# Patient Record
Sex: FEMALE | Race: WHITE | Employment: OTHER | URBAN - METROPOLITAN AREA
[De-identification: names, ages, dates, MRNs, and addresses within clinical notes are randomized per-mention and may not be internally consistent; named-entity substitution may affect disease eponyms.]

---

## 2017-04-04 ENCOUNTER — HOSPITAL ENCOUNTER (OUTPATIENT)
Dept: GENERAL RADIOLOGY | Age: 77
Discharge: HOME | End: 2017-04-04
Payer: MEDICARE

## 2017-04-04 DIAGNOSIS — M54.2 NECK PAIN: ICD-10-CM

## 2017-04-04 PROCEDURE — 72050 X-RAY EXAM NECK SPINE 4/5VWS: CPT

## 2018-08-10 ENCOUNTER — HOSPITAL ENCOUNTER (OUTPATIENT)
Dept: GENERAL RADIOLOGY | Age: 78
Discharge: HOME | End: 2018-08-12
Payer: MEDICARE

## 2018-08-10 DIAGNOSIS — M25.551 RIGHT HIP PAIN: ICD-10-CM

## 2018-08-10 PROCEDURE — 73502 X-RAY EXAM HIP UNI 2-3 VIEWS: CPT

## 2019-03-27 ENCOUNTER — HOSPITAL ENCOUNTER (OUTPATIENT)
Dept: ORTHOPEDIC SURGERY | Age: 79
Discharge: HOME | End: 2019-03-29
Payer: MEDICARE

## 2019-03-27 DIAGNOSIS — M25.561 ARTHRALGIA OF RIGHT LOWER LEG: ICD-10-CM

## 2019-03-27 PROCEDURE — 73560 X-RAY EXAM OF KNEE 1 OR 2: CPT

## 2019-05-16 ENCOUNTER — HOSPITAL ENCOUNTER (OUTPATIENT)
Dept: ULTRASOUND IMAGING | Age: 79
Discharge: HOME | End: 2019-05-18
Payer: MEDICARE

## 2019-05-16 DIAGNOSIS — M79.89 SWELLING OF LOWER EXTREMITY: ICD-10-CM

## 2019-05-16 PROCEDURE — 93971 EXTREMITY STUDY: CPT

## 2019-12-10 ENCOUNTER — HOSPITAL ENCOUNTER (OUTPATIENT)
Dept: GENERAL RADIOLOGY | Age: 79
Discharge: HOME | End: 2019-12-12
Payer: MEDICARE

## 2019-12-10 VITALS — HEART RATE: 106 BPM | SYSTOLIC BLOOD PRESSURE: 141 MMHG | DIASTOLIC BLOOD PRESSURE: 83 MMHG

## 2019-12-10 DIAGNOSIS — M87.00 AVN (AVASCULAR NECROSIS OF BONE) (HCC): ICD-10-CM

## 2019-12-10 PROCEDURE — 6360000002 HC RX W HCPCS: Performed by: ORTHOPAEDIC SURGERY

## 2019-12-10 PROCEDURE — 6360000004 HC RX CONTRAST MEDICATION: Performed by: ORTHOPAEDIC SURGERY

## 2019-12-10 PROCEDURE — 20610 DRAIN/INJ JOINT/BURSA W/O US: CPT

## 2019-12-10 PROCEDURE — 2500000003 HC RX 250 WO HCPCS: Performed by: ORTHOPAEDIC SURGERY

## 2019-12-10 RX ORDER — LIDOCAINE HYDROCHLORIDE 20 MG/ML
20 INJECTION, SOLUTION INFILTRATION; PERINEURAL ONCE
Status: COMPLETED | OUTPATIENT
Start: 2019-12-10 | End: 2019-12-10

## 2019-12-10 RX ORDER — BUPIVACAINE HYDROCHLORIDE 5 MG/ML
10 INJECTION, SOLUTION EPIDURAL; INTRACAUDAL ONCE
Status: COMPLETED | OUTPATIENT
Start: 2019-12-10 | End: 2019-12-10

## 2019-12-10 RX ORDER — TRIAMCINOLONE ACETONIDE 40 MG/ML
80 INJECTION, SUSPENSION INTRA-ARTICULAR; INTRAMUSCULAR ONCE
Status: COMPLETED | OUTPATIENT
Start: 2019-12-10 | End: 2019-12-10

## 2019-12-10 RX ADMIN — LIDOCAINE HYDROCHLORIDE 7 ML: 20 INJECTION, SOLUTION INFILTRATION; PERINEURAL at 10:27

## 2019-12-10 RX ADMIN — BUPIVACAINE HYDROCHLORIDE 3 MG: 5 INJECTION, SOLUTION EPIDURAL; INTRACAUDAL at 10:26

## 2019-12-10 RX ADMIN — IOVERSOL 1 ML: 678 INJECTION INTRA-ARTERIAL; INTRAVENOUS at 10:27

## 2019-12-10 RX ADMIN — TRIAMCINOLONE ACETONIDE 80 MG: 40 INJECTION, SUSPENSION INTRA-ARTICULAR; INTRAMUSCULAR at 10:28

## 2019-12-10 ASSESSMENT — PAIN SCALES - GENERAL
PAINLEVEL_OUTOF10: 7
PAINLEVEL_OUTOF10: 7

## 2019-12-23 ENCOUNTER — HOSPITAL ENCOUNTER (OUTPATIENT)
Dept: MRI IMAGING | Age: 79
Discharge: HOME | End: 2019-12-25
Payer: MEDICARE

## 2019-12-23 DIAGNOSIS — M87.00 AVASCULAR NECROSIS (HCC): ICD-10-CM

## 2019-12-23 PROCEDURE — 73721 MRI JNT OF LWR EXTRE W/O DYE: CPT

## 2020-07-31 ENCOUNTER — HOSPITAL ENCOUNTER (OUTPATIENT)
Dept: PHYSICAL THERAPY | Age: 80
Setting detail: THERAPIES SERIES
Discharge: HOME | End: 2020-07-31
Payer: MEDICARE

## 2020-07-31 PROCEDURE — 97140 MANUAL THERAPY 1/> REGIONS: CPT

## 2020-07-31 PROCEDURE — 97162 PT EVAL MOD COMPLEX 30 MIN: CPT

## 2020-07-31 ASSESSMENT — PAIN - FUNCTIONAL ASSESSMENT: PAIN_FUNCTIONAL_ASSESSMENT: PREVENTS OR INTERFERES WITH MANY ACTIVE NOT PASSIVE ACTIVITIES

## 2020-07-31 ASSESSMENT — PAIN DESCRIPTION - ONSET: ONSET: AWAKENED FROM SLEEP

## 2020-07-31 ASSESSMENT — PAIN DESCRIPTION - FREQUENCY: FREQUENCY: CONTINUOUS

## 2020-07-31 ASSESSMENT — PAIN SCALES - GENERAL: PAINLEVEL_OUTOF10: 3

## 2020-07-31 ASSESSMENT — PAIN DESCRIPTION - LOCATION: LOCATION: HIP;GROIN

## 2020-07-31 ASSESSMENT — PAIN DESCRIPTION - DESCRIPTORS: DESCRIPTORS: ACHING;SHARP;SHOOTING

## 2020-07-31 ASSESSMENT — PAIN DESCRIPTION - ORIENTATION: ORIENTATION: RIGHT

## 2020-07-31 NOTE — PROGRESS NOTES
in home PT visit and progressively worsening despite doing ex's. Pt reports having a MD follow up with xray and doctor advised alignment and healing looked good. Comments: RTD 9/29/20    Objective:        Balance  Comments: unable to SLS without UE assist.    Bed Mobility  Bridging: Stand by assistance    Ambulation 1  Surface: carpet  Device: Rolling Walker  Assistance: Modified Independent  Gait Deviations: Decreased step length, Slow Pily, Decreased head and trunk rotation  Distance: 50'x 2  Stairs  # Steps : 4  Stairs Height: 6\"  Rails: Bilateral  Assistance: Modified independent   Comment: non recip, sidestepping down          Strength RLE  Comment: Hip 3-/5, knee 3+/5, 4/5 ankle, EHL 0/5  Strength LLE  Comment: Hip 3+/5, knee  4/5 ankle, EHL 0/5      AROM RLE (degrees)  RLE General AROM: Hip flex 15, Abd 10, -5-125 knee flex, ankle WFL     AROM LLE (degrees)  LLE General AROM: Hip Flex 45, Abd 30, -5-115 knee flex, ankle WFL        Observation/Palpation  Posture: Fair  Bed mobility  Bridging: Stand by assistance  Supine to Sit: Minimal assistance  Sit to Supine: Stand by assistance     Additional Measures  Flexibility: Tight hamstring B R>L  Special Tests: NT     Exercises:   Exercises  Exercise 1: nu step*  Exercise 2: seated LAQ*  Exercise 3: ham curl*  Exercise 4: ham stretch*  Exercise 5: tennis ball massage per R incision/glut 2 min. Exercise 6: reviewed current HEP and instruct on continuation of HEP  Exercise 20: HEP: hams stretch, sink ex's, tennis bal massage, use of ice for pain. Modalities:  Modalities  Cryotherapy (Minutes\Location): *  Manual:  Manual therapy  Soft Tissue Mobalization: 4 min clemencia R hip and medial glut, avoiding incision  *Indicates exercise,modality, or manual techniques to be initiated when appropriate  Assessment:   Body structures, Functions, Activity limitations: Decreased functional mobility , Decreased ADL status, Decreased balance, Decreased posture, Increased pain, Decreased strength, Decreased ROM  Assessment: The pt's impairments currently limit functional abilities by 97% including her abilities to stand prolonged periods, reach and lift, perform recreational actvities, and perform household/work related duties without pain or limitations. Prognosis: Good  Discharge Recommendations: Continue to assess pending progress        Decision Making: Medium Complexity  History: Needs R knee replacement, L TKR  Exam: LEFS: 2/80=3% functional  Clinical Presentation: evolving        Plan  Frequency/Duration:  Plan  Times per week: 2  Plan weeks: 4-6  Current Treatment Recommendations: Strengthening, Home Exercise Program, ROM, Manual Therapy - Soft Tissue Mobilization, Modalities, Stair training, Gait Training, Transfer Training, Functional Mobility Training, Balance Training, Endurance Training         Patient Education  New Education Provided: PT Education: Goals;PT Role;Plan of Care;Home Exercise Program;Gait Training  Patient Education: Written HEP    POST-PAIN     Pain Rating (0-10 pain scale):   0/10 at rest  Location and pain description same as pre-treatment unless indicated. Action: [x] NA  [] Call Physician  [] Perform HEP  [] Meds as prescribed    Evaluation and patient rights have been reviewed and patient agrees with plan of care. Yes  [x]  No  []   Explain:       Matthew Fall Risk Assessment  Risk Factor Scale  Score   History of Falls [] Yes  [x] No 25  0 0   Secondary Diagnosis [] Yes  [x] No 15  0 0   Ambulatory Aid [] Furniture  [x] Crutches/cane/walker  [] None/bedrest/wheelchair/nurse 30  15  0 15   IV/Heparin Lock [] Yes  [x] No 20  0 0   Gait/Transferring [] Impaired  [x] Weak  [] Normal/bedrest/immobile 20  10  0 10   Mental Status [] Forgets limitations  [x] Oriented to own ability 15  0 0      Total:25     Based on the Assessment score: check the appropriate box.   []  No intervention needed   Low =   Score of 0-24  [x]  Use standard prevention interventions Moderate =  Score of 24-44   [x] Discuss fall prevention strategies   [x] Indicate moderate falls risk on eval  []  Use high risk prevention interventions High = Score of 45 and higher   [] Discuss fall prevention strategies   [] Provide supervision during treatment time    Goals  Short term goals  Time Frame for Short term goals: 2-3 weeks  Short term goal 1: Initiate HEP for pain and symptom mgmt  Short term goal 2: Amb with st cane Indep 100'x 3 with minimal deviations. Long term goals  Time Frame for Long term goals : 4-6 weeks  Long term goal 1: Indep HEP for functional progression and pain mgmt. Long term goal 2: Ambulate with LRD with min to no deviations community distance without increase pain. Long term goal 3: Improve R LE strength 4-/5 to allow patient to perfrom transfers and bed mobility Indep.   Long term goal 4: Pt demo improved overall function by reporting greater than 50% per functional survey score    PT Individual Minutes  Time In: 1100  Time Out: 1145  Minutes: 45  Timed Code Treatment Minutes: 8 Minutes  Procedure Minutes:Eval  37 min     Timed Activity Minutes Units   Ther Ex 4 0   Manual  4 1       Electronically signed by Aminata Gtz PT on 7/31/20 at 1:34 PM EDT

## 2020-07-31 NOTE — PROGRESS NOTES
3+/5, knee  4/5 ankle, EHL 0/5    [] yes  [x] no   Long term goal 4: Pt demo improved overall function by reporting greater than 50% per functional survey score Exam: LEFS: 2/80=3% functional   [] yes  [x] no       Body structures, Functions, Activity limitations: Decreased functional mobility , Decreased ADL status, Decreased balance, Decreased posture, Increased pain, Decreased strength, Decreased ROM  Assessment: The pt's impairments currently limit functional abilities by 97% including her abilities to stand prolonged periods, reach and lift, perform recreational actvities, and perform household/work related duties without pain or limitations. Prognosis: Good  Discharge Recommendations: Continue to assess pending progress      PT Education: Goals;PT Role;Plan of Care;Home Exercise Program;Gait Training  Patient Education: Written HEP    PLAN: [x] Evaluate and Treat  Frequency/Duration:  Plan  Times per week: 2  Plan weeks: 4-6  Current Treatment Recommendations: Strengthening, Home Exercise Program, ROM, Manual Therapy - Soft Tissue Mobilization, Modalities, Stair training, Gait Training, Transfer Training, Functional Mobility Training, Balance Training, Endurance Training     Precautions:         Spinal Precautions: Rods limit ROM, pt reports no hip precautions                  Patient Status:[x] Continue/ Initiate plan of Care    [] Discharge PT. Recommend pt continue with HEP. [] Additional visits requested, Please re-certify for additional visits:          Signature: Electronically signed by Monica Dawson PT on 7/31/20 at 1:39 PM EDT      If you have any questions or concerns, please don't hesitate to call. Thank you for your referral.    I have reviewed this plan of care and certify a need for medically necessary rehabilitation services.     Physician Signature:__________________________________________________________  Date:  Please sign and return

## 2020-08-04 ENCOUNTER — HOSPITAL ENCOUNTER (OUTPATIENT)
Dept: PHYSICAL THERAPY | Age: 80
Setting detail: THERAPIES SERIES
Discharge: HOME | End: 2020-08-04
Payer: MEDICARE

## 2020-08-04 PROCEDURE — 97110 THERAPEUTIC EXERCISES: CPT

## 2020-08-04 PROCEDURE — 97116 GAIT TRAINING THERAPY: CPT

## 2020-08-04 ASSESSMENT — PAIN DESCRIPTION - LOCATION: LOCATION: HIP;GROIN

## 2020-08-04 ASSESSMENT — PAIN DESCRIPTION - ORIENTATION: ORIENTATION: RIGHT

## 2020-08-04 ASSESSMENT — PAIN DESCRIPTION - FREQUENCY: FREQUENCY: CONTINUOUS

## 2020-08-04 ASSESSMENT — PAIN DESCRIPTION - DESCRIPTORS: DESCRIPTORS: ACHING;SHARP

## 2020-08-04 ASSESSMENT — PAIN SCALES - GENERAL: PAINLEVEL_OUTOF10: 7

## 2020-08-04 NOTE — PROGRESS NOTES
recip down stairs with increased deviations and compensatory mvmt. Strength: [x] NT  [] MMT completed:     ROM: [x] NT  [] ROM measurements:     *Indicates exercise, modality, or manual techniques to be initiated when appropriate    Assessment: Body structures, Functions, Activity limitations: Decreased functional mobility , Decreased ADL status, Decreased balance, Decreased posture, Increased pain, Decreased strength, Decreased ROM  Assessment: Pt needs verbal cues and modification to improve alignment, derease compensation and improve wt shift during ther ex. Pt using st cane this date for convenience however educated on use of walker if pain levels cont to increase with wt bearing. Treatment Diagnosis: R LE pain with gait dysfunction limiting overal functional tolerance  Prognosis: Good       Goals:  Short term goals  Time Frame for Short term goals: 2-3 weeks  Short term goal 1: Initiate HEP for pain and symptom mgmt  Short term goal 2: Amb with st cane Indep 100'x 3 with minimal deviations. Long term goals  Time Frame for Long term goals : 4-6 weeks  Long term goal 1: Indep HEP for functional progression and pain mgmt. Long term goal 2: Ambulate with LRD with min to no deviations community distance without increase pain. Long term goal 3: Improve R LE strength 4-/5 to allow patient to perfrom transfers and bed mobility Indep. Long term goal 4: Pt demo improved overall function by reporting greater than 50% per functional survey score  Progress toward goals:ongoing, working on ROM and strengthening to improve overall stability      POST-PAIN       Pain Rating (0-10 pain scale):   5/10 \"It feels better\"   Location and pain description same as pre-treatment unless indicated.    Action: [x] NA   [] Perform HEP  [] Meds as prescribed  [] Modalities as prescribed   [] Call Physician     Frequency/Duration:  Plan  Times per week: 2  Plan weeks: 4-6  Current Treatment Recommendations: Strengthening, Home Exercise Program, ROM, Manual Therapy - Soft Tissue Mobilization, Modalities, Stair training, Gait Training, Transfer Training, Functional Mobility Training, Balance Training, Endurance Training     Pt to continue current HEP. See objective section for any therapeutic exercise changes, additions or modifications this date.     PT Individual Minutes  Time In: 0640  Time Out: [address]  Minutes: 42  Timed Code Treatment Minutes: 42 Minutes  Procedure Minutes:NA     Timed Activity Minutes Units   Ther Ex 34 2   Gait training  8 1       Signature:  Electronically signed by Lilly Samuels PT on 8/4/20 at 9:53 AM EDT

## 2020-08-07 ENCOUNTER — HOSPITAL ENCOUNTER (OUTPATIENT)
Dept: PHYSICAL THERAPY | Age: 80
Setting detail: THERAPIES SERIES
Discharge: HOME | End: 2020-08-07
Payer: MEDICARE

## 2020-08-07 PROCEDURE — 97110 THERAPEUTIC EXERCISES: CPT

## 2020-08-07 PROCEDURE — 97116 GAIT TRAINING THERAPY: CPT

## 2020-08-07 ASSESSMENT — PAIN DESCRIPTION - FREQUENCY: FREQUENCY: CONTINUOUS

## 2020-08-07 ASSESSMENT — PAIN SCALES - GENERAL: PAINLEVEL_OUTOF10: 5

## 2020-08-07 ASSESSMENT — PAIN DESCRIPTION - LOCATION: LOCATION: HIP

## 2020-08-07 ASSESSMENT — PAIN DESCRIPTION - DESCRIPTORS: DESCRIPTORS: ACHING

## 2020-08-07 ASSESSMENT — PAIN DESCRIPTION - ORIENTATION: ORIENTATION: RIGHT

## 2020-08-07 NOTE — PROGRESS NOTES
[address] [address]  Outpatient Physical Therapy    Treatment Note        Date: 2020  Patient: David Urena  : 1940  ACCT #: [de-identified]  Referring Practitioner: Dr Les Koch  Diagnosis: s/p R THR    Visit Information:  PT Visit Information  Onset Date: 20  PT Insurance Information: [employer] Medicare  Total # of Visits Approved: (BMN)  Total # of Visits to Date: 3  Plan of Care/Certification Expiration Date: 20  No Show: 0  Canceled Appointment: 0  Progress Note Counter: 3/8 (PN/recert due )    Subjective: Pt reports feeling a little overworked last visit however feeling better now. Reports B knees had more pain vs hip. Comments: RTD 20  HEP Compliance:  [x] Good [] Fair [] Poor [] Reports not doing due to:    Vital Signs  Patient Currently in Pain: Yes   Pain Screening  Patient Currently in Pain: Yes  Pain Assessment  Pain Level: 5  Pain Location: Hip  Pain Orientation: Right  Pain Descriptors: Aching  Pain Frequency: Continuous    OBJECTIVE:   Exercises  Exercise 1: nu step*  Exercise 2: seated LAQ 3s x 10  Exercise 3: ham curl YTB x 15 (use wash cloth under band for comfort)  Exercise 4: ham stretch 30 s x 3  Exercise 5: tennis ball massage per R incision/groin 1 min. Exercise 6: Hip abd with RTB 2 x 10 (keep left leg equal with R) seated, try supine next visit for comfort. Exercise 7: hip add 5 s x 10 with ball seated  Exercise 8: supine HL march with RTB 2 x 10  Exercise 20: HEP: hams stretch, sink ex's, tennis bal massage, use of ice for pain. Ambulation 1  Surface: carpet  Device: Single point cane  Assistance: Modified Independent  Gait Deviations: Slow Pily  Distance: 300      Car transfer training 5 min. Back up to seat without use of stool, hand placement and use L leg to boost into seat.     Strength: [x] NT  [] MMT completed:     ROM: [x] NT  [] ROM measurements:      Manual:   Manual therapy  PROM: R Hip flex and abd within precautions        *Indicates exercise, modality, or manual techniques to be initiated when appropriate    Assessment: Body structures, Functions, Activity limitations: Decreased functional mobility , Decreased ADL status, Decreased balance, Decreased posture, Increased pain, Decreased strength, Decreased ROM  Assessment: Pt with improved tolerance to ther ex. Min fatigue noted with compensations corrected with verbal cuing. Treatment Diagnosis: R LE pain with gait dysfunction limiting overal functional tolerance  Prognosis: Good       Goals:  Short term goals  Time Frame for Short term goals: 2-3 weeks  Short term goal 1: Initiate HEP for pain and symptom mgmt  Short term goal 2: Amb with st cane Indep 100'x 3 with minimal deviations. Long term goals  Time Frame for Long term goals : 4-6 weeks  Long term goal 1: Indep HEP for functional progression and pain mgmt. Long term goal 2: Ambulate with LRD with min to no deviations community distance without increase pain. Long term goal 3: Improve R LE strength 4-/5 to allow patient to perfrom transfers and bed mobility Indep. Long term goal 4: Pt demo improved overall function by reporting greater than 50% per functional survey score  Progress toward goals:ongoing, working on ROM and strengthening to improve overall stability      POST-PAIN       Pain Rating (0-10 pain scale):  5-6/10   Location and pain description same as pre-treatment unless indicated. Action: [x] NA   [] Perform HEP  [] Meds as prescribed  [] Modalities as prescribed   [] Call Physician     Frequency/Duration:  Plan  Times per week: 2  Plan weeks: 4-6  Current Treatment Recommendations: Strengthening, Home Exercise Program, ROM, Manual Therapy - Soft Tissue Mobilization, Modalities, Stair training, Gait Training, Transfer Training, Functional Mobility Training, Balance Training, Endurance Training     Pt to continue current HEP.   See objective section for any therapeutic exercise changes, additions or modifications this date.          PT Individual Minutes  Time In: 1920  Time Out: 2336  Minutes: 46  Timed Code Treatment Minutes: 46 Minutes  Procedure Minutes:NA     Timed Activity Minutes Units   Ther Ex 31 2   Gait/Transfer training 10 1   Manual  5 0       Signature:  Electronically signed by Nahun Neal, PT on 8/7/20 at 1:00 PM EDT

## 2020-08-11 ENCOUNTER — HOSPITAL ENCOUNTER (OUTPATIENT)
Dept: PHYSICAL THERAPY | Age: 80
Setting detail: THERAPIES SERIES
Discharge: HOME | End: 2020-08-11
Payer: MEDICARE

## 2020-08-11 PROCEDURE — 97110 THERAPEUTIC EXERCISES: CPT

## 2020-08-11 PROCEDURE — 97116 GAIT TRAINING THERAPY: CPT

## 2020-08-11 ASSESSMENT — PAIN DESCRIPTION - DESCRIPTORS: DESCRIPTORS: ACHING

## 2020-08-11 ASSESSMENT — PAIN DESCRIPTION - ORIENTATION: ORIENTATION: RIGHT

## 2020-08-11 ASSESSMENT — PAIN SCALES - GENERAL: PAINLEVEL_OUTOF10: 7

## 2020-08-11 ASSESSMENT — PAIN DESCRIPTION - FREQUENCY: FREQUENCY: CONTINUOUS

## 2020-08-11 ASSESSMENT — PAIN DESCRIPTION - LOCATION: LOCATION: HIP

## 2020-08-11 ASSESSMENT — PAIN DESCRIPTION - ONSET: ONSET: AWAKENED FROM SLEEP

## 2020-08-11 NOTE — PROGRESS NOTES
[id number] [address]  Outpatient Physical Therapy    Treatment Note        Date: 2020  Patient: Anahy Man  : 1940  ACCT #: [de-identified]  Referring Practitioner: Dr Chuck Park  Diagnosis: s/p R THR    Visit Information:  PT Visit Information  Onset Date: 20  PT Insurance Information: [employer] Medicare  Total # of Visits Approved: (BMN)  Total # of Visits to Date: 4  Plan of Care/Certification Expiration Date: 20  No Show: 0  Canceled Appointment: 0  Progress Note Counter:  (PN/recert due 3/12)    Subjective: Pt reports feeling sore after last visit until next morning no soreness and only fatigued. Pt reports yesterday soreness returned without cause and slightly better this date. Comments: RTD 20  HEP Compliance:  [x] Good [] Fair [] Poor [] Reports not doing due to:    Vital Signs  Patient Currently in Pain: Yes   Pain Screening  Patient Currently in Pain: Yes  Pain Assessment  Pain Assessment: 0-10  Pain Level: 7  Pain Location: Hip  Pain Orientation: Right  Pain Radiating Towards: into buttock. Pain Descriptors: Aching  Pain Frequency: Continuous  Pain Onset: Awakened from sleep    OBJECTIVE:   Exercises  Exercise 1: nu step*  Exercise 2: seated LAQ 3s x 10, 1.5#  Exercise 3: ham curl YTB x 15 (use wash cloth under band for comfort)  Exercise 4: ham stretch 30 s x 3  Exercise 5: tennis ball massage per R incision/groin 1 min. Exercise 6: Hip abd with RTB 2 x 10 (keep left leg equal with R) supine  Exercise 7: hip add 5 s x 10 with ball seated  Exercise 8: supine HL march with RTB 2 x 10  Exercise 9: seated hip IR/ER isometric with light pressure. 5 s x 10 IR, x 5 ER stopping due to  increase discomfort  Exercise 20: HEP: hams stretch, sink ex's, tennis ball massage, use of ice for pain.     Ambulation 1  Surface: carpet, outdoors  Device: Single point cane  Assistance: Modified Independent  Gait Deviations: Slow Pily  Distance: 350' to car       Strength: [x] NT  [] MMT completed:     ROM: [] NT  [x] ROM measurements:     AROM RLE (degrees)  RLE General AROM: Hip flex 16,   Manual:   Manual therapy  PROM: R Hip flex and abd within precautions    *Indicates exercise, modality, or manual techniques to be initiated when appropriate    Assessment: Body structures, Functions, Activity limitations: Decreased functional mobility , Decreased ADL status, Decreased balance, Decreased posture, Increased pain, Decreased strength, Decreased ROM  Assessment: Pt tolerated ther ex with modification noted. Pt tolerated without increase pain reported. Treatment Diagnosis: R LE pain with gait dysfunction limiting overal functional tolerance  Prognosis: Good     Goals:  Short term goals  Time Frame for Short term goals: 2-3 weeks  Short term goal 1: Initiate HEP for pain and symptom mgmt  Short term goal 2: Amb with st cane Indep 100'x 3 with minimal deviations. Long term goals  Time Frame for Long term goals : 4-6 weeks  Long term goal 1: Indep HEP for functional progression and pain mgmt. Long term goal 2: Ambulate with LRD with min to no deviations community distance without increase pain. Long term goal 3: Improve R LE strength 4-/5 to allow patient to perfrom transfers and bed mobility Indep. Long term goal 4: Pt demo improved overall function by reporting greater than 50% per functional survey score  Progress toward goals:ongoing, working on ROM and strengthening to improve overall stability      POST-PAIN       Pain Rating (0-10 pain scale):   7/10   Location and pain description same as pre-treatment unless indicated.    Action: [x] NA   [] Perform HEP  [] Meds as prescribed  [] Modalities as prescribed   [] Call Physician     Frequency/Duration:  Plan  Times per week: 2  Plan weeks: 4-6  Current Treatment Recommendations: Strengthening, Home Exercise Program, ROM, Manual Therapy - Soft Tissue Mobilization, Modalities, Stair training, Gait Training, Transfer Training, Functional Mobility Training, Balance Training, Endurance Training     Pt to continue current HEP. See objective section for any therapeutic exercise changes, additions or modifications this date.      PT Individual Minutes  Time In: 6340  Time Out: 5686  Minutes: 47  Timed Code Treatment Minutes: 47 Minutes  Procedure Minutes:NA     Timed Activity Minutes Units   Ther Ex 30 2   Gait training 12 1   Manual  5 0       Signature:  Electronically signed by Seth Barlow PT on 8/11/20 at 12:57 PM EDT

## 2020-08-14 ENCOUNTER — HOSPITAL ENCOUNTER (OUTPATIENT)
Dept: PHYSICAL THERAPY | Age: 80
Setting detail: THERAPIES SERIES
Discharge: HOME | End: 2020-08-14
Payer: MEDICARE

## 2020-08-14 PROCEDURE — 97110 THERAPEUTIC EXERCISES: CPT

## 2020-08-14 PROCEDURE — 97140 MANUAL THERAPY 1/> REGIONS: CPT

## 2020-08-14 ASSESSMENT — PAIN DESCRIPTION - LOCATION: LOCATION: HIP

## 2020-08-14 ASSESSMENT — PAIN DESCRIPTION - FREQUENCY: FREQUENCY: CONTINUOUS

## 2020-08-14 ASSESSMENT — PAIN DESCRIPTION - PROGRESSION: CLINICAL_PROGRESSION: NOT CHANGED

## 2020-08-14 ASSESSMENT — PAIN DESCRIPTION - ORIENTATION: ORIENTATION: RIGHT

## 2020-08-14 ASSESSMENT — PAIN SCALES - GENERAL: PAINLEVEL_OUTOF10: 6

## 2020-08-14 ASSESSMENT — PAIN DESCRIPTION - DESCRIPTORS: DESCRIPTORS: ACHING

## 2020-08-14 NOTE — PROGRESS NOTES
[address] [address]  Outpatient Physical Therapy    Treatment Note        Date: 2020  Patient: Mariangel Morelos  : 1940  ACCT #: [de-identified]  Referring Practitioner: Dr Kale Auguste  Diagnosis: s/p R THR    Visit Information:  PT Visit Information  Onset Date: 20  PT Insurance Information: [employer] Medicare  Total # of Visits Approved: (BMN)  Total # of Visits to Date: 5  Plan of Care/Certification Expiration Date: 20  No Show: 0  Canceled Appointment: 0  Progress Note Counter:  (PN/recert due )    Subjective: Pt reports felt better after last visit and then next day after sitting too long increased hip pain. Pt encouraged this date to call MD office to inform of cont pain in right hip that increases with walking and prolonged sitting. Comments: RTD 20  HEP Compliance:  [x] Good [] Fair [] Poor [] Reports not doing due to:    Vital Signs  Patient Currently in Pain: Yes   Pain Screening  Patient Currently in Pain: Yes  Pain Assessment  Pain Level: 6  Pain Location: Hip  Pain Orientation: Right  Pain Radiating Towards: lat hip and buttock  Pain Descriptors: Aching  Pain Frequency: Continuous  Clinical Progression: Not changed    OBJECTIVE:   Exercises  Exercise 1: nu step 1.2 min x 5 min  Exercise 2: seated LAQ 3s x 10, 1.5#  Exercise 3: ham curl YTB x 15 (use wash cloth under band for comfort)  Exercise 6: Hip abd with RTB 2 x 10 (keep left leg equal with R) supine  Exercise 7: hip add 5 s x 10 with ball seated  Exercise 8: supine HL march with RTB 2 x 10  Exercise 9: seated hip IR/ER isometric with light pressure. 5 s x 10 IR, x 5 ER stopping due to  increase discomfort  Exercise 10: Bridge 3s x 10  Exercise 20: HEP: hams stretch, sink ex's, tennis ball massage, use of ice for pain.        Strength: [x] NT  [] MMT completed:      ROM: [x] NT  [] ROM measurements:      Manual:   Manual therapy  PROM: R Hip flex and abd within precautions  Soft Tissue Mobalization: massage per R incision/groin 3 min. Tenderness distal incision and towards glut, scar adhesion palpated superiorly and one dissolvable stitch sticking out inferiorly. Modalities:  Modalities  Cryotherapy (Minutes\Location): 10 min R hip/buttock     *Indicates exercise, modality, or manual techniques to be initiated when appropriate    Assessment: Body structures, Functions, Activity limitations: Decreased functional mobility , Decreased ADL status, Decreased balance, Decreased posture, Increased pain, Decreased strength, Decreased ROM  Assessment: Pt tolerated ther ex well without increasing pain. Cont to have tenderness to palpate incisional region. Conclude with CP however cont to have higher pain level. Pt may benefit from aquatic therapy to assist with pain control. Pt advised to cont use of walkder for longer distances due to complaints of increase pain. Treatment Diagnosis: R LE pain with gait dysfunction limiting overal functional tolerance  Prognosis: Good     Goals:  Short term goals  Time Frame for Short term goals: 2-3 weeks  Short term goal 1: Initiate HEP for pain and symptom mgmt  Short term goal 2: Amb with st cane Indep 100'x 3 with minimal deviations. Long term goals  Time Frame for Long term goals : 4-6 weeks  Long term goal 1: Indep HEP for functional progression and pain mgmt. Long term goal 2: Ambulate with LRD with min to no deviations community distance without increase pain. Long term goal 3: Improve R LE strength 4-/5 to allow patient to perfrom transfers and bed mobility Indep. Long term goal 4: Pt demo improved overall function by reporting greater than 50% per functional survey score  Progress toward goals:Able to tolerate new additions however pain limiting increase functional tolerance. POST-PAIN       Pain Rating (0-10 pain scale):   7/10   Location and pain description same as pre-treatment unless indicated.    Action: [] NA   [] Perform HEP  [] Meds as prescribed  [] Modalities as prescribed   [] Call Physician     Frequency/Duration:  Plan  Times per week: 2  Plan weeks: 4-6  Current Treatment Recommendations: Strengthening, Home Exercise Program, ROM, Manual Therapy - Soft Tissue Mobilization, Modalities, Stair training, Gait Training, Transfer Training, Functional Mobility Training, Balance Training, Endurance Training     Pt to continue current HEP. See objective section for any therapeutic exercise changes, additions or modifications this date.     PT Individual Minutes  Time In: 1220  Time Out: 1219  Minutes: 52  Timed Code Treatment Minutes: 42 Minutes  Procedure Minutes:10 min CP     Timed Activity Minutes Units   Ther Ex 34 2   Manual  8 1       Signature:  Electronically signed by Ervin Belcher, PT on 8/14/20 at 11:49 AM EDT

## 2020-08-17 NOTE — PROGRESS NOTES
[address]     Ph: [phone]  Fax: [phone]    [] Certification  [x] Recertification []  Plan of Care  [x] Progress Note [] Discharge      To:  Dr Tonia Melendez      From:  Lili Woodruff, PT  Patient: Arben Sampson     : 1940  Diagnosis: s/p R THR     Date: 2020  Treatment Diagnosis: R LE pain with gait dysfunction limiting overal functional tolerance    Plan of Care/Certification Expiration Date: 20  Progress Report Period from:  2020  to 2020    Total # of Visits to Date: 5   No Show: 0    Canceled Appointment: 0     OBJECTIVE:   Short Term Goals - Time Frame for Short term goals: 2-3 weeks    Goals Current/Discharge status  Met   Short term goal 1: Initiate HEP for pain and symptom mgmt  Written HEP provided  for symptom management   [x] yes  [] no   Short term goal 2: Amb with st cane Indep 100'x 3 with minimal deviations. Ambulates with wheeled walker [] yes  [x] no     Long Term Goals - Time Frame for Long term goals : 4-6 weeks  Goals Current/ Discharge status Met   Long term goal 1: Indep HEP for functional progression and pain mgmt. Written HEP provided  for symptom management  Needs progression [] yes  [x] no   Long term goal 2: Ambulate with LRD with min to no deviations community distance without increase pain. Ambulates community distances with ww and short household distances with cane. [] yes  [x] no   Long term goal 3: Improve R LE strength 4-/5 to allow patient to perfrom transfers and bed mobility Indep. NT however difficulty with SLR    [] yes  [x] no   Long term goal 4: Pt demo improved overall function by reporting greater than 50% per functional survey score Pt with decreased functional activity tolerance due to pain.   [] yes  [] no     Body structures, Functions, Activity limitations: Decreased functional mobility , Decreased ADL status, Decreased balance, Decreased posture, Increased pain, Decreased strength, Decreased ROM  Assessment: Pt reporting pain levels limiting functional wt bearing tolerance and limiting ability to progress. Pt tolerated ther ex well this date without increasing pain. Cont to have tenderness to palpate incisional region. Conclude with CP however cont to have higher pain level. Pt may benefit from aquatic therapy to assist with pain control. Pt advised to cont use of walkder for longer distances due to complaints of increase pain. Prognosis: Good  Discharge Recommendations: Continue to assess pending progress    PLAN: [x]   Frequency/Duration:  Plan  Times per week: 2  Plan weeks: 4-6  Current Treatment Recommendations: Strengthening, Home Exercise Program, ROM, Manual Therapy - Soft Tissue Mobilization, Modalities, Stair training, Gait Training, Transfer Training, Functional Mobility Training, Balance Training, Endurance Training, Aquatics  Plan Comment: Recert complete this date and adding aquatic to POC                   Patient Status:[x] Continue plan of Care    [] Discharge PT. Recommend pt continue with HEP. [x] Additional visits requested, Please re-certify for additional visits:8          Signature: Electronically signed by Ervin Belcher PT on 8/17/20 at 2:15 PM EDT      If you have any questions or concerns, please don't hesitate to call. Thank you for your referral.    I have reviewed this plan of care and certify a need for medically necessary rehabilitation services.     Physician Signature:__________________________________________________________  Date:  Please sign and return

## 2020-08-18 ENCOUNTER — HOSPITAL ENCOUNTER (OUTPATIENT)
Dept: PHYSICAL THERAPY | Age: 80
Setting detail: THERAPIES SERIES
Discharge: HOME | End: 2020-08-18
Payer: MEDICARE

## 2020-08-18 PROCEDURE — 97110 THERAPEUTIC EXERCISES: CPT

## 2020-08-18 PROCEDURE — 97140 MANUAL THERAPY 1/> REGIONS: CPT

## 2020-08-18 ASSESSMENT — PAIN DESCRIPTION - FREQUENCY: FREQUENCY: CONTINUOUS

## 2020-08-18 ASSESSMENT — PAIN SCALES - GENERAL: PAINLEVEL_OUTOF10: 6

## 2020-08-18 ASSESSMENT — PAIN DESCRIPTION - ORIENTATION: ORIENTATION: RIGHT

## 2020-08-18 ASSESSMENT — PAIN DESCRIPTION - DESCRIPTORS: DESCRIPTORS: ACHING;BURNING

## 2020-08-18 ASSESSMENT — PAIN DESCRIPTION - LOCATION: LOCATION: HIP;GROIN;BUTTOCKS

## 2020-08-18 NOTE — PROGRESS NOTES
[address] [address]  Outpatient Physical Therapy    Treatment Note        Date: 2020  Patient: David Urena  : 1940  ACCT #: [de-identified]  Referring Practitioner: Dr Les Koch  Diagnosis: s/p R THR    Visit Information:  PT Visit Information  Onset Date: 20  PT Insurance Information: [employer] Medicare  Total # of Visits Approved: (BMN)  Total # of Visits to Date: 6  Plan of Care/Certification Expiration Date: 20  No Show: 0  Canceled Appointment: 0  Progress Note Counter:  (PN/recert due )    Subjective: Pt report no sig change in pain level. Pain was slightly better after last visit however cont with pain eneida with walking and into groin and now buttock. Pt reports anterior incision has opened up after she scratched it and it was bleeding. Therapist observed open area and suggested pt follow up with MD. Therapist called MD office to notify of pain levels and recent incision opening. Comments: RTD 20  HEP Compliance:  [x] Good [] Fair [] Poor [] Reports not doing due to:    Vital Signs  Patient Currently in Pain: Yes   Pain Screening  Patient Currently in Pain: Yes  Pain Assessment  Pain Level: 6  Pain Location: Hip;Groin; Buttocks  Pain Orientation: Right  Pain Descriptors: Aching;Burning  Pain Frequency: Continuous    OBJECTIVE:   Exercises  Exercise 1: nu step 1.2 min x 5 min (decreased pain)  Exercise 2: seated LAQ 3s x 10, 1.5#  Exercise 3: ham curl RTB x 15 (use wash cloth under band for comfort)  Exercise 4: hip ext isometric 5s x 5 with increased pain  Exercise 5: ham curl isometric 5 s x 10  Exercise 6: Hip abd with RTB 2 x 10 (keep left leg equal with R) supine  Exercise 7: hip add 5 s x 15 with ball seated  Exercise 8: seated  march with RTB 2 x 10  Exercise 9: seated hip IR/ER isometric with light pressure. 5 s x 10 IR,  Exercise 20: HEP: hams stretch, sink ex's, tennis ball massage, use of ice for pain.   Set up assistance with ex's performed by Indigo Swartz therapy aide to assist PT. Strength: [x] NT  [] MMT completed:        ROM: [] NT  [x] ROM measurements:  PROM RLE (degrees)  RLE General PROM: 80 deg hip flex, 20 deg abd no pain      Manual:   Manual therapy  PROM: R Hip flex and abd within precautions 5 min  Soft Tissue Mobalization: massage per R incision posterior and glut 5 min. Tenderness distal incision and towards glut, scar adhesion palpated superiorly. Post hamstring and ITB STM. 5 min    *Indicates exercise, modality, or manual techniques to be initiated when appropriate    Assessment: Body structures, Functions, Activity limitations: Decreased functional mobility , Decreased ADL status, Decreased balance, Decreased posture, Increased pain, Decreased strength, Decreased ROM  Assessment: Pt tolerated manual treatment with decreased pain reported. Min soreness noted during ther ex as indicated. Pt with two separate complaints of pain one in the groin area that increases with ambulation and one posteriorly appears muscular in nature. Treatment Diagnosis: R LE pain with gait dysfunction limiting overal functional tolerance  Prognosis: Good       Goals:  Short term goals  Time Frame for Short term goals: 2-3 weeks  Short term goal 1: Initiate HEP for pain and symptom mgmt  Short term goal 2: Amb with st cane Indep 100'x 3 with minimal deviations. Long term goals  Time Frame for Long term goals : 4-6 weeks  Long term goal 1: Indep HEP for functional progression and pain mgmt. Long term goal 2: Ambulate with LRD with min to no deviations community distance without increase pain. Long term goal 3: Improve R LE strength 4-/5 to allow patient to perfrom transfers and bed mobility Indep.   Long term goal 4: Pt demo improved overall function by reporting greater than 50% per functional survey score  Progress toward goals:ongoing, working on ROM and strengthening to improve overall stability      POST-PAIN       Pain Rating (0-10 pain scale):   3/10 \"much better\"  Location and pain description same as pre-treatment unless indicated. Action: [x] NA   [] Perform HEP  [] Meds as prescribed  [] Modalities as prescribed   [] Call Physician     Frequency/Duration:  Plan  Times per week: 2  Plan weeks: 4  Current Treatment Recommendations: Strengthening, Home Exercise Program, ROM, Manual Therapy - Soft Tissue Mobilization, Modalities, Stair training, Gait Training, Transfer Training, Functional Mobility Training, Balance Training, Endurance Training, Aquatics  Plan Comment: Hold aquatic until incision closed. Pt to continue current HEP. See objective section for any therapeutic exercise changes, additions or modifications this date.     PT Individual Minutes  Time In: 2105  Time Out: 6533  Minutes: 52  Timed Code Treatment Minutes: 42 Minutes  Procedure Minutes:NA     Timed Activity Minutes Units   Ther Ex 27 2   Manual  15 1       Signature:  Electronically signed by Ervin Belcher PT on 8/18/20 at 12:58 PM EDT

## 2020-08-21 ENCOUNTER — HOSPITAL ENCOUNTER (OUTPATIENT)
Dept: PHYSICAL THERAPY | Age: 80
Setting detail: THERAPIES SERIES
Discharge: HOME | End: 2020-08-21
Payer: MEDICARE

## 2020-08-21 NOTE — PROGRESS NOTES
Therapy                            Cancellation/No-show Note      Date:  2020  Patient Name:  Ty Bell  :  1940   MRN:  [id number]  Referring Practitioner: Dr Jamari Mccoy  Diagnosis: s/p R THR    Visit Information:  PT Visit Information  Onset Date: 20  PT Insurance Information: [employer] Medicare  Total # of Visits Approved: (BMN)  Total # of Visits to Date: 6  Plan of Care/Certification Expiration Date: 20  No Show: 0  Canceled Appointment: 0  Progress Note Counter:  (PN/recert due 3/52) ( cx)    For today's appointment patient:  [x]  Cancelled  []  Rescheduled appointment  []  No-show   []  Called pt to remind of next appointment     Reason given by patient:  []  Patient ill  []  Conflicting appointment  []  No transportation    []  Conflict with work  []  No reason given  [x]  Other:  Pt presents to appt reporting Dr office advised to hold off therapy at this time and do more walking at home. Pt enters clinic with st cane. Reviewed HEP and advised pt if groin pain cont to increase with walking the walker is recommended. Pt to cont with current HEP to tolerance and call dept when doctor advises ok to return. [x] Pt has future appointments scheduled, no follow up needed. Pt will call if unable to make next week appts. [] Pt requests to be on hold.     Reason:   If > 2 weeks please discuss with therapist.  [] Therapist to call pt for follow up     Comments:       Signature: Electronically signed by Jacob Toscano, PT on 20 at 11:37 AM EDT

## 2020-08-24 ENCOUNTER — HOSPITAL ENCOUNTER (OUTPATIENT)
Dept: PHYSICAL THERAPY | Age: 80
Setting detail: THERAPIES SERIES
Discharge: HOME | End: 2020-08-24
Payer: MEDICARE

## 2020-08-24 NOTE — PROGRESS NOTES
Therapy                            Cancellation/No-show Note      Date:  2020  Patient Name:  Kamini Gaines  :  1940   MRN:  [id number]  Referring Practitioner: Dr Analisa Cristina  Diagnosis: s/p R THR    Visit Information:  PT Visit Information  Onset Date: 20  PT Insurance Information: [employer] Medicare  Total # of Visits Approved: (BMN)  Total # of Visits to Date: 6  Plan of Care/Certification Expiration Date: 20  No Show: 0  Canceled Appointment: 1  Progress Note Counter:  (PN/recert due ) (06 cx for the week)    For today's appointment patient:  [x]  Cancelled  []  Rescheduled appointment  []  No-show   []  Called pt to remind of next appointment     Reason given by patient:  []  Patient ill  []  Conflicting appointment  []  No transportation    []  Conflict with work  []  No reason given  [x]  Other:  Dr advised to hold therapy one week and return with aquatics    [x] Pt has future appointments scheduled, no follow up needed  [] Pt requests to be on hold.     Reason:   If > 2 weeks please discuss with therapist.  [] Therapist to call pt for follow up     Comments:       Signature: Electronically signed by Shine Ramos PT on 20 at 8:43 AM EDT

## 2020-08-28 ENCOUNTER — APPOINTMENT (OUTPATIENT)
Dept: PHYSICAL THERAPY | Age: 80
End: 2020-08-28
Payer: MEDICARE

## 2020-09-02 ENCOUNTER — HOSPITAL ENCOUNTER (OUTPATIENT)
Dept: PHYSICAL THERAPY | Age: 80
Setting detail: THERAPIES SERIES
Discharge: HOME | End: 2020-09-02
Payer: MEDICARE

## 2020-09-02 PROCEDURE — 97113 AQUATIC THERAPY/EXERCISES: CPT

## 2020-09-02 ASSESSMENT — PAIN DESCRIPTION - FREQUENCY: FREQUENCY: CONTINUOUS

## 2020-09-02 ASSESSMENT — PAIN DESCRIPTION - ORIENTATION: ORIENTATION: RIGHT

## 2020-09-02 ASSESSMENT — PAIN DESCRIPTION - DESCRIPTORS: DESCRIPTORS: SORE;ACHING

## 2020-09-02 ASSESSMENT — PAIN SCALES - GENERAL: PAINLEVEL_OUTOF10: 2

## 2020-09-02 ASSESSMENT — PAIN DESCRIPTION - LOCATION: LOCATION: GROIN

## 2020-09-02 NOTE — PROGRESS NOTES
[address] [address]  Outpatient Physical Therapy    Treatment Note        Date: 2020  Patient: Maria Isabel Bauer  : 1940  ACCT #: [de-identified]  Referring Practitioner: Dr Natty Sykes  Diagnosis: s/p R THR    Visit Information:  PT Visit Information  Onset Date: 20  PT Insurance Information: [employer] Medicare  Total # of Visits Approved: (BMN)  Total # of Visits to Date: 7  Plan of Care/Certification Expiration Date: 20  No Show: 0  Canceled Appointment: 1  Progress Note Counter:  (PN/recert due )    Subjective: Pt reports to therapy to initiate aquatic treatment and not using AD this date. Pt reports pain has decreased although groin pain continues with increased walking. Comments: RTD 20  HEP Compliance:  [x] Good [] Fair [] Poor [] Reports not doing due to:    Vital Signs  Patient Currently in Pain: Yes   Pain Screening  Patient Currently in Pain: Yes  Pain Assessment  Pain Level: 2  Pain Location: Groin  Pain Orientation: Right  Pain Radiating Towards: lat hip slight pain  Pain Descriptors: Sore;Aching  Pain Frequency: Continuous    OBJECTIVE:   Exercises  Exercise 1: Aquatics  Exercise 2: Amb F, R, S x 10 laps shallow end shorter distance  Exercise 3: sink ex's 15 reps, no abduction  Exercise 4: small hip circles x 10 reps cw B  Exercise 5: step ups x 10 R 1 rail  Exercise 20: HEP: hams stretch, sink ex's, tennis ball massage, use of ice for pain.        Ambulation 1  Surface: level tile  Device: No Device  Assistance: Modified Independent  Quality of Gait: mod lat sway with lat arm swing momentum to advance LE's, R LE IR, step to gait pattern  Gait Deviations: Slow Pily  Distance: 100'    Stairs  # Steps : 4  Stairs Height: 6\"  Rails: Bilateral  Device: No Device  Comment: recip 2 stairs and nonrecip x 2 steps       Strength: [x] NT  [] MMT completed:        ROM: [x] NT  [] ROM measurements:    *Indicates exercise, modality, or manual techniques to be initiated when appropriate    Assessment: Body structures, Functions, Activity limitations: Decreased functional mobility , Decreased ADL status, Decreased balance, Decreased posture, Increased pain, Decreased strength, Decreased ROM  Assessment: Initiated aquatic treatment this date with good tolerance and no increase pain. Pt needs vc to keep AROM within a controlled range to avoid irritation. Pt reports incision is completly closed. Treatment Diagnosis: R LE pain with gait dysfunction limiting overal functional tolerance  Prognosis: Good       Goals:  Short term goals  Time Frame for Short term goals: 2-3 weeks  Short term goal 1: Initiate HEP for pain and symptom mgmt  Short term goal 2: Amb with st cane Indep 100'x 3 with minimal deviations. Long term goals  Time Frame for Long term goals : 4-6 weeks  Long term goal 1: Indep HEP for functional progression and pain mgmt. Long term goal 2: Ambulate with LRD with min to no deviations community distance without increase pain. Long term goal 3: Improve R LE strength 4-/5 to allow patient to perfrom transfers and bed mobility Indep. Long term goal 4: Pt demo improved overall function by reporting greater than 50% per functional survey score  Progress toward goals:ongoing, working on ROM and strengthening to improve overall stability      POST-PAIN       Pain Rating (0-10 pain scale):  2 /10   Location and pain description same as pre-treatment unless indicated. Action: [x] NA   [] Perform HEP  [] Meds as prescribed  [] Modalities as prescribed   [] Call Physician     Frequency/Duration:  Plan  Times per week: 2  Plan weeks: 4  Current Treatment Recommendations: Strengthening, Home Exercise Program, ROM, Manual Therapy - Soft Tissue Mobilization, Modalities, Stair training, Gait Training, Transfer Training, Functional Mobility Training, Balance Training, Endurance Training, Aquatics     Pt to continue current HEP.   See objective section for any therapeutic exercise changes, additions or modifications this date.     PT Individual Minutes  Time In: 8052  Time Out: 3427  Minutes: 38  Timed Code Treatment Minutes: 38 Minutes  Procedure Minutes:NA     Timed Activity Minutes Units   Aquatic Ther Ex 38 3       Signature:  Electronically signed by Woody Mendoza PT on 9/2/20 at 3:12 PM EDT

## 2020-09-04 ENCOUNTER — HOSPITAL ENCOUNTER (OUTPATIENT)
Dept: PHYSICAL THERAPY | Age: 80
Setting detail: THERAPIES SERIES
Discharge: HOME | End: 2020-09-04
Payer: MEDICARE

## 2020-09-04 PROCEDURE — 97113 AQUATIC THERAPY/EXERCISES: CPT

## 2020-09-04 ASSESSMENT — PAIN DESCRIPTION - FREQUENCY: FREQUENCY: CONTINUOUS

## 2020-09-04 ASSESSMENT — PAIN DESCRIPTION - DESCRIPTORS: DESCRIPTORS: ACHING

## 2020-09-04 ASSESSMENT — PAIN SCALES - GENERAL: PAINLEVEL_OUTOF10: 4

## 2020-09-04 NOTE — PROGRESS NOTES
[address] [address]  Outpatient Physical Therapy    Treatment Note        Date: 2020  Patient: Love Kim  : 1940  ACCT #: [de-identified]  Referring Practitioner: Dr Thomas [place]  Diagnosis: s/p R THR    Visit Information:  PT Visit Information  Onset Date: 20  PT Insurance Information: [employer] Medicare  Total # of Visits Approved: (BMN)  Total # of Visits to Date: 8  Plan of Care/Certification Expiration Date: 20  No Show: 0  Canceled Appointment: 1  Progress Note Counter: 3/8 (PN/recert due 4/10)    Subjective: Pt reports increased soreness after last visit mostly in L knee however notices groin pain continues in R hip  Comments: RTD 20  HEP Compliance:  [x] Good [] Fair [] Poor [] Reports not doing due to:    Vital Signs  Patient Currently in Pain: Yes   Pain Screening  Patient Currently in Pain: Yes  Pain Assessment  Pain Level: 4  Pain Descriptors: Aching  Pain Frequency: Continuous    OBJECTIVE:   Exercises  Exercise 1: Aquatics  Exercise 2: Amb F, R, S x 10 laps shallow end shorter distance  Exercise 3: sink ex's 15 reps, no abduction  Exercise 4: small hip circles x 10 reps cw B (needs vc to keep motion smaller)  Exercise 5: step ups x 10 R 1 rail  Exercise 6: Hip abd with RTB 2 x 10 (keep left leg equal with R) supine  Exercise 7: SLS 10 s x 5 B  Exercise 8: hip hikes x 10 B  Exercise 9: noodle hang in shallow end with bicycle, therapist holding noodle for balance  Exercise 20: HEP: hams stretch, sink ex's, tennis ball massage, use of ice for pain. Strength: [x] NT  [] MMT completed:     ROM: [x] NT  [] ROM measurements:     *Indicates exercise, modality, or manual techniques to be initiated when appropriate    Assessment:         Body structures, Functions, Activity limitations: Decreased functional mobility , Decreased ADL status, Decreased balance, Decreased posture, Increased pain, Decreased strength, Decreased ROM  Assessment: Pt tolerated aquatics with no increase Signature:  Electronically signed by Lilly Samuels PT on 9/4/20 at 2:00 PM EDT

## 2020-09-08 ENCOUNTER — HOSPITAL ENCOUNTER (OUTPATIENT)
Dept: PHYSICAL THERAPY | Age: 80
Setting detail: THERAPIES SERIES
Discharge: HOME | End: 2020-09-08
Payer: MEDICARE

## 2020-09-08 PROCEDURE — 97113 AQUATIC THERAPY/EXERCISES: CPT

## 2020-09-08 ASSESSMENT — PAIN DESCRIPTION - LOCATION: LOCATION: HIP;GROIN

## 2020-09-08 ASSESSMENT — PAIN SCALES - GENERAL: PAINLEVEL_OUTOF10: 2

## 2020-09-08 ASSESSMENT — PAIN DESCRIPTION - ORIENTATION: ORIENTATION: RIGHT

## 2020-09-08 ASSESSMENT — PAIN DESCRIPTION - FREQUENCY: FREQUENCY: CONTINUOUS

## 2020-09-08 ASSESSMENT — PAIN DESCRIPTION - DESCRIPTORS: DESCRIPTORS: ACHING

## 2020-09-08 NOTE — PROGRESS NOTES
[address] [address]  Outpatient Physical Therapy    Treatment Note        Date: 2020  Patient: Peewee Murphy  : 1940  ACCT #: [de-identified]  Referring Practitioner: Dr Ruby Bone  Diagnosis: s/p R THR    Visit Information:  PT Visit Information  Onset Date: 20  PT Insurance Information: [employer]na Medicare  Total # of Visits Approved: (BMN)  Total # of Visits to Date: 9  Plan of Care/Certification Expiration Date: 20  No Show: 0  Canceled Appointment: 1  Progress Note Counter:  (PN/recert due )    Subjective: Pt presents with script to cont Aquatic therapy. Guidelines written on script for 6-8 week progressions and pt is s/p 8 weeks post surgery. Pt reports no hip pain over the weekend, felt pain walking into clinic this morning \"that is the longest distance I've been walking\". Pt reports L>R knee discomfort over the weekend. Comments: RTD 20  HEP Compliance:  [x] Good [] Fair [] Poor [] Reports not doing due to:    Vital Signs  Patient Currently in Pain: Yes   Pain Screening  Patient Currently in Pain: Yes  Pain Assessment  Pain Level: 2  Pain Location: Hip;Groin  Pain Orientation: Right  Pain Descriptors: Aching  Pain Frequency: Continuous    OBJECTIVE:   Exercises  Exercise 1: Aquatics  Exercise 2: Amb F, R, S x 10 laps shallow end shorter distance  Exercise 3: sink ex's 15 reps, no abduction  Exercise 4: small hip circles x 10 reps cw B (needs vc to keep motion smaller)  Exercise 5: step ups x 10 R 1 rail  Exercise 6: hip abd estefanía <20 deg abd 5s x 10  Exercise 7: hip ext isometric 5 s x 10,  Exercise 8: hip hikes x 10 B  Exercise 9: noodle hang in shallow end with bicycle 2 min, therapist holding noodle for balance  Exercise 10: SLS 10 s x 5 B  Exercise 11: ham stretch 10s on (R pain) , 30 sec on left ,  Exercise 20: HEP: hams stretch, sink ex's, tennis ball massage, use of ice for pain.        Strength: [x] NT  [] MMT completed:        ROM: [x] NT  [] ROM measurements: *Indicates exercise, modality, or manual techniques to be initiated when appropriate    Assessment: Body structures, Functions, Activity limitations: Decreased functional mobility , Decreased ADL status, Decreased balance, Decreased posture, Increased pain, Decreased strength, Decreased ROM  Assessment: Pt tolerating ther ex during treatment without increase in pain. Attempted Quad isometric and R ham stretch however held due to calf and groin pain. Pt tolerated hip ext and abd isometric  with light pressure without pain. Treatment Diagnosis: R LE pain with gait dysfunction limiting overal functional tolerance  Prognosis: Good       Goals:  Short term goals  Time Frame for Short term goals: 2-3 weeks  Short term goal 1: Initiate HEP for pain and symptom mgmt  Short term goal 2: Amb with st cane Indep 100'x 3 with minimal deviations. Long term goals  Time Frame for Long term goals : 4-6 weeks  Long term goal 1: Indep HEP for functional progression and pain mgmt. Long term goal 2: Ambulate with LRD with min to no deviations community distance without increase pain. Long term goal 3: Improve R LE strength 4-/5 to allow patient to perfrom transfers and bed mobility Indep. Long term goal 4: Pt demo improved overall function by reporting greater than 50% per functional survey score  Progress toward goals:progressing toward goals, decrease amb deviations noted entering and exiting clinic    POST-PAIN       Pain Rating (0-10 pain scale):   2/10   Location and pain description same as pre-treatment unless indicated.    Action: [x] NA   [] Perform HEP  [] Meds as prescribed  [] Modalities as prescribed   [] Call Physician     Frequency/Duration:  Plan  Times per week: 2  Plan weeks: 4  Current Treatment Recommendations: Strengthening, Home Exercise Program, ROM, Manual Therapy - Soft Tissue Mobilization, Modalities, Stair training, Gait Training, Transfer Training, Functional Mobility Training, Balance Training, Endurance Training, Aquatics     Pt to continue current HEP. See objective section for any therapeutic exercise changes, additions or modifications this date.     PT Individual Minutes  Time In: 9825  Time Out: 1059  Minutes: 40  Timed Code Treatment Minutes: 38 Minutes  Procedure Minutes:NA     Timed Activity Minutes Units   Aq Ther Ex 38 3       Signature:  Electronically signed by Monica Dawson PT on 9/8/20 at 10:58 AM EDT

## 2020-09-11 ENCOUNTER — APPOINTMENT (OUTPATIENT)
Dept: PHYSICAL THERAPY | Age: 80
End: 2020-09-11
Payer: MEDICARE

## 2020-09-14 ENCOUNTER — HOSPITAL ENCOUNTER (OUTPATIENT)
Dept: PHYSICAL THERAPY | Age: 80
Setting detail: THERAPIES SERIES
Discharge: HOME | End: 2020-09-14
Payer: MEDICARE

## 2020-09-14 PROCEDURE — 97113 AQUATIC THERAPY/EXERCISES: CPT

## 2020-09-14 NOTE — PROGRESS NOTES
[address] [address]  Outpatient Physical Therapy    Treatment Note        Date: 2020  Patient: Love Kim  : 1940  ACCT #: [de-identified]  Referring Practitioner: Dr Thomas [place]  Diagnosis: s/p R THR    Visit Information:  PT Visit Information  Onset Date: 20  PT Insurance Information: [employer] Medicare  Total # of Visits Approved: (BMN)  Total # of Visits to Date: 10  Plan of Care/Certification Expiration Date: 20  No Show: 0  Canceled Appointment: 1  Progress Note Counter:  (PN/recert due )    Subjective: Pt reports hip feeling better with intermittent groin pain, \"my knees are giving me more trouble than my hip\"  Comments: RTD 20  HEP Compliance:  [x] Good [] Fair [] Poor [] Reports not doing due to:    Vital Signs  Patient Currently in Pain: Denies(B knee pain 5/10, 0/10 hip)   Pain Screening  Patient Currently in Pain: Denies(B knee pain 5/10, 0/10 hip)    OBJECTIVE:   Exercises  Exercise 1: Aquatics  Exercise 2: Amb F, R, S x 10 laps shallow end shorter distance  Exercise 3: sink ex's 15 reps, no abduction  Exercise 4: small hip circles x 10 reps cw B (needs vc to keep motion smaller)  Exercise 5: step ups x 2 R 1 rail, held due to increase hip discomfort  Exercise 6: hip abd estefanía <20 deg abd 5s x 10  Exercise 7: walking high knees 3 laps  Exercise 8: hip hikes x 10 B  Exercise 9: noodle hang in shallow end with bicycle 2 min, therapist holding noodle for balance  Exercise 10: SLS 10 s x 5 B  Exercise 20: HEP: hams stretch, sink ex's, tennis ball massage, use of ice for pain.     Ambulation 1  Surface: level tile  Device: No Device  Assistance: Modified Independent  Quality of Gait: min to no lat sway without lat arm swing, decreased wt bearing on R LE in stance, step minimally through gait pattern  Gait Deviations: Slow Pily  Distance: 100'  Strength: [x] NT  [] MMT completed:        ROM: [x] NT  [] ROM measurements:        *Indicates exercise, modality, or manual section for any therapeutic exercise changes, additions or modifications this date.          PT Individual Minutes  Time In: 1120  Time Out: 1200  Minutes: 40  Timed Code Treatment Minutes: 40 Minutes  Procedure Minutes:NA     Timed Activity Minutes Units   Aquatic Ther Ex 40 3       Signature:  Electronically signed by Merced Becker, PT on 9/14/20 at 12:36 PM EDT

## 2020-09-16 ENCOUNTER — HOSPITAL ENCOUNTER (OUTPATIENT)
Dept: PHYSICAL THERAPY | Age: 80
Setting detail: THERAPIES SERIES
Discharge: HOME | End: 2020-09-16
Payer: MEDICARE

## 2020-09-16 PROCEDURE — 97140 MANUAL THERAPY 1/> REGIONS: CPT

## 2020-09-16 PROCEDURE — 97113 AQUATIC THERAPY/EXERCISES: CPT

## 2020-09-16 ASSESSMENT — PAIN DESCRIPTION - FREQUENCY: FREQUENCY: CONTINUOUS

## 2020-09-16 ASSESSMENT — PAIN DESCRIPTION - LOCATION: LOCATION: HIP

## 2020-09-16 ASSESSMENT — PAIN DESCRIPTION - DESCRIPTORS: DESCRIPTORS: ACHING

## 2020-09-16 ASSESSMENT — PAIN SCALES - GENERAL: PAINLEVEL_OUTOF10: 3

## 2020-09-16 ASSESSMENT — PAIN DESCRIPTION - ORIENTATION: ORIENTATION: RIGHT

## 2020-09-16 NOTE — PROGRESS NOTES
[id number] [address]  Outpatient Physical Therapy    Treatment Note        Date: 2020  Patient: Siddharth Winthrop  : 1940  ACCT #: [de-identified]  Referring Practitioner: Dr Rhonda Batista  Diagnosis: s/p R THR    Visit Information:  PT Visit Information  Onset Date: 20  PT Insurance Information: [employer] Medicare  Total # of Visits Approved: (BMN)  Total # of Visits to Date: 6  Plan of Care/Certification Expiration Date: 10/16/20  No Show: 0  Canceled Appointment: 1  Progress Note Counter:  ( next PN counter, PN due  10/16)    Subjective: Pt reports having balance issues and difficulty walking up stairs. Reports walking is improving. Comments: RTD 20  HEP Compliance:  [x] Good [] Fair [] Poor [] Reports not doing due to:    Vital Signs  Patient Currently in Pain: Yes(B knee pain 5/10, 0/10 hip)   Pain Screening  Patient Currently in Pain: Yes(B knee pain 5/10, 0/10 hip)  Pain Assessment  Pain Level: 3  Pain Location: Hip  Pain Orientation: Right  Pain Descriptors: Aching  Pain Frequency: Continuous    OBJECTIVE:   Exercises  Exercise 1: Aquatics  Exercise 2: Amb F, R, S x 10 laps shallow end shorter distance  Exercise 3: sink ex's 15 reps, no abduction  Exercise 4: small hip circles x 10 reps cw B (needs vc to keep motion smaller)  Exercise 5: step ups x 2 R 1 rail, held due to increase hip discomfort  Exercise 6: hip abd estefanía <20 deg abd 5s x 10  Exercise 7: walking high knees 3 laps  Exercise 8: hip hikes x 10 B  Exercise 9: noodle hang in shallow end with bicycle 2 min, therapist holding noodle for balance  Exercise 10: SLS 10 s x 5 B  Exercise 20: HEP: hams stretch, sink ex's, tennis ball massage, use of ice for pain.        Strength: [] NT  [x] MMT completed:  Strength RLE  Comment: Hip 3/5 to 3+/5 in standing, knee 3+/5 to 4-/5     ROM: [x] NT  [] ROM measurements:   Manual:   Manual therapy  PROM: R Hip flex and abd within precautions 5 min  Soft Tissue Mobalization: massage per R incision posterior and glut 5 min. Tenderness distal incision and towards glut, scar adhesion palpated superiorly. Post hamstring and ITB STM. 5 min  Other: 5 min MMT/goal assessment            *Indicates exercise, modality, or manual techniques to be initiated when appropriate    Assessment: Body structures, Functions, Activity limitations: Decreased functional mobility , Decreased ADL status, Decreased balance, Decreased posture, Increased pain, Decreased strength, Decreased ROM  Assessment: Pt progressing well toward goals, improved function and strength since  onset of PT. Pt cont with difficulty with balance, stair climbing, and walking tolerance 5 min. Pt would benefit to cont skilled PT to address and progress function. Treatment Diagnosis: R LE pain with gait dysfunction limiting overal functional tolerance  Prognosis: Good       Goals:  Short term goals  Time Frame for Short term goals: 2-3 weeks  Short term goal 1: Initiate HEP for pain and symptom mgmt  Short term goal 2: Amb with st cane Indep 100'x 3 with minimal deviations. Long term goals  Time Frame for Long term goals : 4-6 weeks  Long term goal 1: Indep HEP for functional progression and pain mgmt. Long term goal 2: Ambulate with LRD with min to no deviations community distance without increase pain. Long term goal 3: Improve R LE strength 4-/5 to allow patient to perfrom transfers and bed mobility Indep. Long term goal 4: Pt demo improved overall function by reporting greater than 50% per functional survey score  Progress toward goals:See PN    POST-PAIN       Pain Rating (0-10 pain scale): \"same\"  /10   Location and pain description same as pre-treatment unless indicated.    Action: [] NA   [] Perform HEP  [] Meds as prescribed  [] Modalities as prescribed   [] Call Physician     Frequency/Duration:  Plan  Times per week: 2  Plan weeks: 4  Current Treatment Recommendations: Strengthening, Home Exercise Program, ROM, Manual Therapy - Soft Tissue Mobilization, Modalities, Stair training, Gait Training, Transfer Training, Functional Mobility Training, Balance Training, Endurance Training, Aquatics  Plan Comment: Cont with 3-4 weeks for improved overall function     Pt to continue current HEP. See objective section for any therapeutic exercise changes, additions or modifications this date.          PT Individual Minutes  Time In: 0920  Time Out: 1000  Minutes: 40  Timed Code Treatment Minutes: 40 Minutes  Procedure Minutes:NA     Timed Activity Minutes Units   Aq Ther Ex 35 2   Manual  5 1       Signature:  Electronically signed by Radha Ramos PT on 9/16/20 at 9:52 AM EDT

## 2020-09-16 NOTE — PROGRESS NOTES
[address]     Ph: [phone]  Fax: [phone]    [] Certification  [x] Recertification []  Plan of Care  [x] Progress Note [] Discharge      To:  Dr Malena Rowell      From:  Az Mccoy, PT  Patient: Chantelle Hickman     : 1940  Diagnosis: s/p R THR     Date: 2020  Treatment Diagnosis: R LE pain with gait dysfunction limiting overal functional tolerance    Plan of Care/Certification Expiration Date: 10/16/20  Progress Report Period from:  2020  to 2020    Total # of Visits to Date: 6   No Show: 0    Canceled Appointment: 1     OBJECTIVE:   Short Term Goals - Time Frame for Short term goals: 2-3 weeks    Goals Current/Discharge status  Met   Short term goal 1: Initiate HEP for pain and symptom mgmt  Written HEP provided  for symptom management   [x] yes  [] no   Short term goal 2: Amb with st cane Indep 100'x 3 with minimal deviations. Amb. Indep without AD with min deviations 100' x 3 [x] yes  [] no     Long Term Goals - Time Frame for Long term goals : 4-6 weeks  Goals Current/ Discharge status Met   Long term goal 1: Indep HEP for functional progression and pain mgmt. Needs progression of HEP as tolerated [] yes  [x] no   Long term goal 2: Ambulate with LRD with min to no deviations community distance without increase pain. Amb Indep without AD with min deviations 100' x 3, 5 min tolerance [] yes  [x] no   Long term goal 3: Improve R LE strength 4-/5 to allow patient to perfrom transfers and bed mobility Indep.  Strength RLE  Comment: Hip 3/5 to 3+/5 in standing, knee 3+/5 to 4-/5      [] yes  [x] no   Long term goal 4: Pt demo improved overall function by reporting greater than 50% per functional survey score Exam: LEFS 27/80=34% functional   [] yes  [x] no       Body structures, Functions, Activity limitations: Decreased functional mobility , Decreased ADL status, Decreased balance, Decreased posture, Increased pain, Decreased strength, Decreased ROM  Assessment: Pt progressing well toward goals, improved function and strength since  onset of PT. Pt cont with difficulty with balance, stair climbing, and walking tolerance 5 min. Pt would benefit to cont skilled PT to address and progress function. Prognosis: Good  Discharge Recommendations: Continue to assess pending progress  PLAN: [x]   Frequency/Duration:  Plan  Times per week: 2  Plan weeks: 4  Current Treatment Recommendations: Strengthening, Home Exercise Program, ROM, Manual Therapy - Soft Tissue Mobilization, Modalities, Stair training, Gait Training, Transfer Training, Functional Mobility Training, Balance Training, Endurance Training, Aquatics  Plan Comment: Cont with 3-4 weeks for improved overall function     Precautions: needs knee replacement                           Patient Status:[x] Continue/ Initiate plan of Care    [] Discharge PT. Recommend pt continue with HEP. [x] Additional visits requested, Please re-certify for additional visits:6-8          Signature: Electronically signed by Seth Barlow PT on 9/16/20 at 9:18 AM EDT      If you have any questions or concerns, please don't hesitate to call. Thank you for your referral.    I have reviewed this plan of care and certify a need for medically necessary rehabilitation services.     Physician Signature:__________________________________________________________  Date:  Please sign and return

## 2020-09-22 ENCOUNTER — HOSPITAL ENCOUNTER (OUTPATIENT)
Dept: PHYSICAL THERAPY | Age: 80
Setting detail: THERAPIES SERIES
Discharge: HOME | End: 2020-09-22
Payer: MEDICARE

## 2020-09-22 PROCEDURE — 97113 AQUATIC THERAPY/EXERCISES: CPT

## 2020-09-22 NOTE — PROGRESS NOTES
[id number] [address]  Outpatient Physical Therapy    Treatment Note        Date: 2020  Patient: Tasia Dsouza  : 1940  ACCT #: [de-identified]  Referring Practitioner: Dr Santhosh Kelly  Diagnosis: s/p R THR    Visit Information:  PT Visit Information  Onset Date: 20  PT Insurance Information: [employer] Medicare  Total # of Visits Approved: (BMN)  Total # of Visits to Date: 7  Plan of Care/Certification Expiration Date: 10/16/20  No Show: 0  Canceled Appointment: 1  Progress Note Counter:  ( PN due  10/16)    Subjective: Pt reports R hip feeling better. The knee is on and off pain. Has ortho appt next week. Pt inquiring if the therapy will help knee before surgery. Pt reports L buttock discomfort at night. Comments: RTD 20  HEP Compliance:  [x] Good [] Fair [] Poor [] Reports not doing due to:    Vital Signs  Patient Currently in Pain: Denies(R knee pain 5/10, 0/10 hip)   Pain Screening  Patient Currently in Pain: Denies(R knee pain 5/10, 0/10 hip)    OBJECTIVE:   Exercises  Exercise 1: Aquatics  Exercise 2: Amb F, R, S x 10 laps shallow end shorter distance  Exercise 3: sink ex's 15 reps, no abduction  Exercise 4: small hip circles x 10 reps cw B (needs vc to keep motion smaller)  Exercise 5: hip ext isometric 5s x 10 R LE  Exercise 6: hip abd estefanía <20 deg abd 5s x 10  Exercise 7: walking high knees 3 laps  Exercise 8: hip hikes x 10 B  Exercise 9: noodle hang in shallow end with bicycle 2 min, therapist holding noodle for balance  Exercise 10: SLS 10 s x 5 B  Exercise 11: hamstring 30 sec on left ,  Exercise 20: HEP: hams stretch, sink ex's, tennis ball massage, use of ice for pain. Strength: [x] NT  [] MMT completed:   ROM: [x] NT  [] ROM measurements:           *Indicates exercise, modality, or manual techniques to be initiated when appropriate    Assessment:         Body structures, Functions, Activity limitations: Decreased functional mobility , Decreased ADL status, Decreased balance, Decreased posture, Increased pain, Decreased strength, Decreased ROM  Assessment: Pt tolerated ther ex in the pool with 50% verbal cues needed for technique, watching posture and slowing down. Overall pt tolerating well and able to progress without increasing symptoms of hip or knee. Pt tolerated stretch of L hamstring however still unable to initiate step ups due to R knee pain  Treatment Diagnosis: R LE pain with gait dysfunction limiting overal functional tolerance  Prognosis: Good       Goals:  Short term goals  Time Frame for Short term goals: 2-3 weeks  Short term goal 1: Initiate HEP for pain and symptom mgmt  Short term goal 2: Amb with st cane Indep 100'x 3 with minimal deviations. Long term goals  Time Frame for Long term goals : 4-6 weeks  Long term goal 1: Indep HEP for functional progression and pain mgmt. Long term goal 2: Ambulate with LRD with min to no deviations community distance without increase pain. Long term goal 3: Improve R LE strength 4-/5 to allow patient to perfrom transfers and bed mobility Indep. Long term goal 4: Pt demo improved overall function by reporting greater than 50% per functional survey score  Progress toward goals:ongoing, working on ROM and strengthening to improve overall stability    POST-PAIN       Pain Rating (0-10 pain scale):  0 /10 R hip   Location and pain description same as pre-treatment unless indicated. Action: [x] NA   [] Perform HEP  [] Meds as prescribed  [] Modalities as prescribed   [] Call Physician     Frequency/Duration:  Plan  Times per week: 2  Plan weeks: 4  Current Treatment Recommendations: Strengthening, Home Exercise Program, ROM, Manual Therapy - Soft Tissue Mobilization, Modalities, Stair training, Gait Training, Transfer Training, Functional Mobility Training, Balance Training, Endurance Training, Aquatics  Plan Comment: Cont with 3-4 weeks for improved overall function     Pt to continue current HEP.   See objective section

## 2020-09-25 ENCOUNTER — HOSPITAL ENCOUNTER (OUTPATIENT)
Dept: PHYSICAL THERAPY | Age: 80
Setting detail: THERAPIES SERIES
Discharge: HOME | End: 2020-09-25
Payer: MEDICARE

## 2020-09-25 PROCEDURE — 97113 AQUATIC THERAPY/EXERCISES: CPT

## 2020-09-25 NOTE — PROGRESS NOTES
[id number] [address]  Outpatient Physical Therapy    Treatment Note        Date: 2020  Patient: Cassandra Romero  : 1940  ACCT #: [de-identified]  Referring Practitioner: Dr Vicki Mora  Diagnosis: s/p R THR    Visit Information:  PT Visit Information  Onset Date: 20  PT Insurance Information: [employer] Medicare  Total # of Visits Approved: (BMN)  Total # of Visits to Date: 8  Plan of Care/Certification Expiration Date: 10/16/20  No Show: 0  Canceled Appointment: 1  Progress Note Counter:  ( PN due  10/16)    Subjective: Pt reports L knee with discomfort x 2 days and Intemittent R hip pain with low intensity. Pt has follow up visit with MD next week. No complaints of posterior L hip pain since last visit  Comments: RTD 20  HEP Compliance:  [x] Good [] Fair [] Poor [] Reports not doing due to:    Vital Signs  Patient Currently in Pain: Denies(R knee pain 5/10, 0/10 hip)   Pain Screening  Patient Currently in Pain: Denies(R knee pain 5/10, 0/10 hip)    OBJECTIVE:   Exercises  Exercise 1: Aquatics  Exercise 2: Amb F, R, S x 10 laps shallow end shorter distance  Exercise 3: sink ex's 15 reps, no abduction  Exercise 4: small hip circles x 10 reps cw B (needs vc to keep motion smaller)  Exercise 5: hip ext isometric 5s x 10 R LE  Exercise 6: hip abd estefanía <20 deg abd 5s x 10  Exercise 7: walking high knees 3 laps  Exercise 8: hip hikes x 10 B  Exercise 9: noodle hang in shallow end with bicycle 2 min, therapist holding noodle for balance  Exercise 10: SLS 10 s x 5 B  Exercise 20: HEP: hams stretch, sink ex's, tennis ball massage, use of ice for pain.        Ambulation 1  Surface: level tile  Device: No Device  Assistance: Independent  Quality of Gait: no lat sway with arm swing, improved and almost equal wt bearing on R LE in stance, step minimally through gait pattern  Distance: 100'       Strength: [x] NT  [] MMT completed:     ROM: [x] NT  [] ROM measurements:        *Indicates exercise, modality, continue current HEP. See objective section for any therapeutic exercise changes, additions or modifications this date.     PT Individual Minutes  Time In: 2104  Time Out: 2600  Minutes: 39  Timed Code Treatment Minutes: 39 Minutes  Procedure Minutes:NA     Timed Activity Minutes Units   AquaticTher Ex 39 3       Signature:  Electronically signed by Merced Becker, PT on 9/25/20 at 12:28 PM EDT

## 2020-09-30 ENCOUNTER — HOSPITAL ENCOUNTER (OUTPATIENT)
Dept: PHYSICAL THERAPY | Age: 80
Setting detail: THERAPIES SERIES
Discharge: HOME | End: 2020-09-30
Payer: MEDICARE

## 2020-09-30 PROCEDURE — 97113 AQUATIC THERAPY/EXERCISES: CPT

## 2020-09-30 NOTE — PROGRESS NOTES
[address] [address]  Outpatient Physical Therapy    Treatment Note        Date: 2020  Patient: Macie Barahona  : 1940  ACCT #: [de-identified]  Referring Practitioner: Dr Dwight Gtz  Diagnosis: s/p R THR    Visit Information:  PT Visit Information  Onset Date: 20  PT Insurance Information: [employer] Medicare  Total # of Visits Approved: (BMN)  Total # of Visits to Date: 9  Plan of Care/Certification Expiration Date: 10/16/20  No Show: 0  Canceled Appointment: 1  Progress Note Counter: 3/8 ( PN due  10/16)    Subjective: Pt reports having an MD appt that went well. Is scheduled for R TKR in two weeks. Anticipate PT DC this week. Comments: RTD 20  HEP Compliance:  [x] Good [] Fair [] Poor [] Reports not doing due to:    Vital Signs  Patient Currently in Pain: Denies(0/10 hip, \"knee hurts bad\")   Pain Screening  Patient Currently in Pain: Denies(0/10 hip, \"knee hurts bad\")    OBJECTIVE:   Exercises  Exercise 1: Aquatics  Exercise 2: Amb F, R, S x 10 laps shallow end shorter distance  Exercise 3: sink ex's 15 reps, no abduction  Exercise 4: small hip circles x 10 reps cw B (needs vc to keep motion smaller)  Exercise 5: hip ext isometric 5s x 10 R LE  Exercise 6: hip abd estefanía <20 deg abd 5s x 10  Exercise 7: walking high knees 3 laps  Exercise 8: hip hikes x 10 B  Exercise 9: noodle hang in shallow end with bicycle 2 min, therapist holding noodle for balance  Exercise 10: SLS 10 s x 5 B  Exercise 20: HEP: hams stretch, sink ex's, tennis ball massage, use of ice for pain. Strength: [x] NT  [] MMT completed:        ROM: [x] NT  [] ROM measurements:     *Indicates exercise, modality, or manual techniques to be initiated when appropriate    Assessment:         Body structures, Functions, Activity limitations: Decreased functional mobility , Decreased ADL status, Decreased balance, Decreased posture, Increased pain, Decreased strength, Decreased ROM  Assessment: Pt tolerated aquatic without increase hip pain. Reports min R knee pain post treatment. All ex's modified to avoid R knee/hip pain. Cont to hold step up and hamstring stretch. Treatment Diagnosis: R LE pain with gait dysfunction limiting overal functional tolerance  Prognosis: Good       Goals:  Short term goals  Time Frame for Short term goals: 2-3 weeks  Short term goal 1: Initiate HEP for pain and symptom mgmt  Short term goal 2: Amb with st cane Indep 100'x 3 with minimal deviations. Long term goals  Time Frame for Long term goals : 4-6 weeks  Long term goal 1: Indep HEP for functional progression and pain mgmt. Long term goal 2: Ambulate with LRD with min to no deviations community distance without increase pain. Long term goal 3: Improve R LE strength 4-/5 to allow patient to perfrom transfers and bed mobility Indep. Long term goal 4: Pt demo improved overall function by reporting greater than 50% per functional survey score  Progress toward goals:ongoing, working on ROM and strengthening to improve overall stability      POST-PAIN       Pain Rating (0-10 pain scale):   0/10 hip   Location and pain description same as pre-treatment unless indicated. Action: [x] NA   [] Perform HEP  [] Meds as prescribed  [] Modalities as prescribed   [] Call Physician     Frequency/Duration:  Plan  Times per week: 2  Plan weeks: 4  Current Treatment Recommendations: Strengthening, Home Exercise Program, ROM, Manual Therapy - Soft Tissue Mobilization, Modalities, Stair training, Gait Training, Transfer Training, Functional Mobility Training, Balance Training, Endurance Training, Aquatics     Pt to continue current HEP. See objective section for any therapeutic exercise changes, additions or modifications this date.     PT Individual Minutes  Time In: 8998  Time Out: 1119  Minutes: 39  Timed Code Treatment Minutes: 39 Minutes  Procedure Minutes:NA     Timed Activity Minutes Units   AquaticTher Ex 39 3       Signature:  Electronically signed by Helen Esparza, PT on 9/30/20 at 11:10 AM EDT

## 2020-10-02 ENCOUNTER — HOSPITAL ENCOUNTER (OUTPATIENT)
Dept: PHYSICAL THERAPY | Age: 80
Setting detail: THERAPIES SERIES
Discharge: HOME | End: 2020-10-02
Payer: MEDICARE

## 2020-10-02 PROCEDURE — 97113 AQUATIC THERAPY/EXERCISES: CPT

## 2020-10-02 NOTE — PROGRESS NOTES
[address]     Ph: [phone]  Fax: [phone]    [] Certification  [] Recertification []  Plan of Care  [] Progress Note [x] Discharge      To:  Dr Carrillo Axon      From:  Madhuri Verma, PT  Patient: Jhonatan Guzman     : 1940  Diagnosis: s/p R THR     Date: 10/2/2020  Treatment Diagnosis: R LE pain with gait dysfunction limiting overal functional tolerance    Plan of Care/Certification Expiration Date: 10/16/20  Progress Report Period from:  2020  to 10/2/2020    Total # of Visits to Date: 10   No Show: 0    Canceled Appointment: 1     OBJECTIVE:   Short Term Goals - Time Frame for Short term goals: 2-3 weeks    Goals Current/Discharge status  Met   Short term goal 1: Initiate HEP for pain and symptom mgmt  Written HEP provided  for symptom management   [x] yes  [] no   Short term goal 2: Amb with st cane Indep 100'x 3 with minimal deviations. Pt amb community distance without AD Indep [x] yes  [] no     Long Term Goals - Time Frame for Long term goals : 4-6 weeks  Goals Current/ Discharge status Met   Long term goal 1: Indep HEP for functional progression and pain mgmt. Written HEP provided  for symptom management   [x] yes  [] no   Long term goal 2: Ambulate with LRD with min to no deviations community distance without increase pain. Pt amb community distance without AD Indep [x] yes  [] no   Long term goal 3: Improve R LE strength 4-/5 to allow patient to perfrom transfers and bed mobility Indep.  Strength RLE  Comment: Hip 3/5 to 3+/5 in standing, knee 3+/5 to 4-/5    [] yes  [x] no   Long term goal 4: Pt demo improved overall function by reporting greater than 50% per functional survey score Exam: LEFS 41/80=51% functional   [x] yes  [] no     Body structures, Functions, Activity limitations: Decreased functional mobility , Decreased ADL status, Decreased balance, Decreased posture, Increased pain, Decreased strength, Decreased ROM  Assessment: Pt progressed well towards all goals and is well prepared for R TKR. Pt has good knowledge HEP and is agreeable to DC at this time. Prognosis: Good    PLAN: [x]   Frequency/Duration:  Plan  Current Treatment Recommendations: Strengthening, Home Exercise Program, ROM, Manual Therapy - Soft Tissue Mobilization, Modalities, Stair training, Gait Training, Transfer Training, Functional Mobility Training, Balance Training, Endurance Training, Aquatics                    Patient Status:[] Continue/ Initiate plan of Care    [x] Discharge PT. Recommend pt continue with HEP. [] Additional visits requested, Please re-certify for additional visits:          Signature: Electronically signed by Royal Ba PT on 10/2/20 at 2:59 PM EDT      If you have any questions or concerns, please don't hesitate to call. Thank you for your referral.    I have reviewed this plan of care and certify a need for medically necessary rehabilitation services.     Physician Signature:__________________________________________________________  Date:  Please sign and return

## 2020-10-02 NOTE — PROGRESS NOTES
[address] [address]  Outpatient Physical Therapy    Treatment Note        Date: 10/2/2020  Patient: Dana Renae  : 1940  ACCT #: [de-identified]  Referring Practitioner: Dr Jeremiah Mejia  Diagnosis: s/p R THR    Visit Information:  PT Visit Information  Onset Date: 20  PT Insurance Information: [employer] Medicare  Total # of Visits Approved: (BMN)  Total # of Visits to Date: 10  Plan of Care/Certification Expiration Date: 10/16/20  No Show: 0  Canceled Appointment: 1  Progress Note Counter:  ( PN due  10/16)    Subjective: No new complaints. Discussed current HEP and progressions as able. Pt having R TKR 10/15/20  Comments: RTD 20  HEP Compliance:  [x] Good [] Fair [] Poor [] Reports not doing due to:    Vital Signs  Patient Currently in Pain: Denies(0/10 hip, \"knee hurts bad\")   Pain Screening  Patient Currently in Pain: Denies(0/10 hip, \"knee hurts bad\")    OBJECTIVE:   Exercises  Exercise 1: Aquatics  Exercise 2: Amb F, R, S x 10 laps shallow end shorter distance  Exercise 3: sink ex's 15 reps, no abduction  Exercise 4: small hip circles x 10 reps cw B (needs vc to keep motion smaller)  Exercise 5: hip ext isometric 5s x 10 R LE  Exercise 6: hip abd estefanía <20 deg abd 5s x 10  Exercise 7: walking high knees 3 laps  Exercise 8: hip hikes x 10 B  Exercise 9: noodle hang in shallow end with bicycle 2 min, therapist holding noodle for balance  Exercise 10: SLS 10 s x 5 B  Exercise 20: HEP: hams stretch, sink ex's, tennis ball massage, use of ice for pain. Strength: [] NT  [x] MMT completed:  Strength RLE  Comment: Hip 3/5 to 3+/5 in standing, knee 3+/5 to 4-/5      ROM: [x] NT  [] ROM measurements:      *Indicates exercise, modality, or manual techniques to be initiated when appropriate    Assessment:         Body structures, Functions, Activity limitations: Decreased functional mobility , Decreased ADL status, Decreased balance, Decreased posture, Increased pain, Decreased strength, Decreased ROM  Assessment: Pt progressed well towards all goals and is well prepared for R TKR. Pt has good knowledge HEP and is agreeable to DC at this time. Treatment Diagnosis: R LE pain with gait dysfunction limiting overal functional tolerance  Prognosis: Good       Goals:  Short term goals  Time Frame for Short term goals: 2-3 weeks  Short term goal 1: Initiate HEP for pain and symptom mgmt  Short term goal 2: Amb with st cane Indep 100'x 3 with minimal deviations. Long term goals  Time Frame for Long term goals : 4-6 weeks  Long term goal 1: Indep HEP for functional progression and pain mgmt. Long term goal 2: Ambulate with LRD with min to no deviations community distance without increase pain. Long term goal 3: Improve R LE strength 4-/5 to allow patient to perfrom transfers and bed mobility Indep. Long term goal 4: Pt demo improved overall function by reporting greater than 50% per functional survey score  Progress toward goals:See DC summary  POST-PAIN       Pain Rating (0-10 pain scale):  0 /10   Location and pain description same as pre-treatment unless indicated. Action: [x] NA   [] Perform HEP  [] Meds as prescribed  [] Modalities as prescribed   [] Call Physician     Frequency/Duration:  Plan  Current Treatment Recommendations: Strengthening, Home Exercise Program, ROM, Manual Therapy - Soft Tissue Mobilization, Modalities, Stair training, Gait Training, Transfer Training, Functional Mobility Training, Balance Training, Endurance Training, Aquatics     Pt to continue current HEP. See objective section for any therapeutic exercise changes, additions or modifications this date.     PT Individual Minutes  Time In: 3955  Time Out: 7043  Minutes: 39  Timed Code Treatment Minutes: 39 Minutes  Procedure Minutes:NA     Timed Activity Minutes Units   Aq Ther Ex 39 3       Signature:  Electronically signed by Carlyle Foster PT on 10/2/20 at 2:54 PM EDT

## 2020-11-09 ENCOUNTER — HOSPITAL ENCOUNTER (OUTPATIENT)
Dept: PHYSICAL THERAPY | Age: 80
Setting detail: THERAPIES SERIES
Discharge: HOME | End: 2020-11-09
Payer: MEDICARE

## 2020-11-09 PROCEDURE — 97162 PT EVAL MOD COMPLEX 30 MIN: CPT

## 2020-11-09 PROCEDURE — 97110 THERAPEUTIC EXERCISES: CPT

## 2020-11-09 PROCEDURE — 97016 VASOPNEUMATIC DEVICE THERAPY: CPT

## 2020-11-09 ASSESSMENT — PAIN DESCRIPTION - DESCRIPTORS: DESCRIPTORS: ACHING

## 2020-11-09 ASSESSMENT — PAIN - FUNCTIONAL ASSESSMENT: PAIN_FUNCTIONAL_ASSESSMENT: PREVENTS OR INTERFERES WITH ALL ACTIVE AND SOME PASSIVE ACTIVITIES

## 2020-11-09 ASSESSMENT — PAIN SCALES - GENERAL: PAINLEVEL_OUTOF10: 7

## 2020-11-09 ASSESSMENT — PAIN DESCRIPTION - FREQUENCY: FREQUENCY: CONTINUOUS

## 2020-11-09 ASSESSMENT — PAIN DESCRIPTION - ORIENTATION: ORIENTATION: RIGHT

## 2020-11-09 ASSESSMENT — PAIN DESCRIPTION - LOCATION: LOCATION: KNEE

## 2020-11-09 ASSESSMENT — PAIN DESCRIPTION - ONSET: ONSET: AWAKENED FROM SLEEP

## 2020-11-09 NOTE — PROGRESS NOTES
[address]     Ph: [phone]  Fax: [phone]    [x] Certification  [] Recertification [x]  Plan of Care  [] Progress Note [] Discharge      To:  Dillan Pod      From:  Mayo Coates, PT  Patient: Alberto Canales     : 1940  Diagnosis: R unilateral knee replacement, pain     Date: 2020  Treatment Diagnosis: R knee pain with functional deficits. Plan of Care/Certification Expiration Date: 21  Progress Report Period from:  2020  to 2020    Total # of Visits to Date: 1   No Show: 0    Canceled Appointment: 0     OBJECTIVE:   Short Term Goals - Time Frame for Short term goals: 4 weeks    Goals Current/Discharge status  Met   Short term goal 1: Initiate written HEP for symptom management. Written HEP provided  for symptom management   [] yes  [x] no   Short term goal 2: Decrease R knee pain 50% to assist with improved functional gains. Pain Location: Knee    Pain Level: 7(worst 7/10 R knee)    Pain Descriptors: Aching [] yes  [x] no   Short term goal 3: R knee PROM 0-100 deg to improve overall function. PROM RLE (degrees)  RLE General PROM: -3 ext, 95 Flex    [] yes  [x] no     Long Term Goals - Time Frame for Long term goals : 8 weeks  Goals Current/ Discharge status Met   Long term goal 1: Indep HEP for symptom management Written HEP provided  for symptom management   [] yes  [] no   Long term goal 2: Pt demo improved overall function by reporting greater than 90% per functional survey score Exam: LEFS 11/80= 14% functional   [] yes  [x] no   Long term goal 3: Improve R LE strength 4/5 to allow patient to recip stair climbing Strength RLE  Comment: Hip 3-/5, knee 3+/5, ankle 4-/5. Pt guarded with all movements. [] yes  [x] no   Long term goal 4: Ambulate with least restrictive device safe/Indep community distances with min to no deviations.   Ambulation 1  Surface: carpet  Device: No Device  Assistance: Independent  Quality of Gait: IR R LE intermittently  Gait Deviations: Slow Pily, Decreased step length  Distance: 50' x 2    [] yes  [x] no   Long term goal 5: Pain-free AROM knee 0-110 to WNL allowing an increase in ADL tolerance AROM RLE (degrees)  RLE General AROM: Hip flex unable, abd unable, -12-94 Knee, ankle WNL   [] yes  [x] no      Body structures, Functions, Activity limitations: Decreased functional mobility , Decreased endurance, Decreased posture, Increased pain, Decreased ROM, Decreased strength, Decreased balance  Assessment: The pt's impairments currently limit functional abilities by 86% including her abilities to  ambulate, perform recreational actvities, and perform household/work related duties without pain or limitations  Prognosis: Good  Discharge Recommendations: Continue to assess pending progress      PT Education: Goals;PT Role;Plan of Care;Home Exercise Program  Patient Education: Written HEP provided    PLAN: [x] Evaluate and Treat  Frequency/Duration:  Plan  Times per week: 2-3  Plan weeks: 6-8  Current Treatment Recommendations: Strengthening, ROM, Neuromuscular Re-education, Home Exercise Program, Manual Therapy - Soft Tissue Mobilization, Manual Therapy - Joint Manipulation, Functional Mobility Training, Transfer Training, Gait Training, Modalities, Stair training, Aquatics     Precautions: Low risk falls, recent THR                         Patient Status:[x] Continue/ Initiate plan of Care    [] Discharge PT. Recommend pt continue with HEP. [] Additional visits requested, Please re-certify for additional visits:          Signature: Electronically signed by Nuris Palafox PT on 11/9/20 at 10:23 AM EST      If you have any questions or concerns, please don't hesitate to call. Thank you for your referral.    I have reviewed this plan of care and certify a need for medically necessary rehabilitation services.     Physician Signature:__________________________________________________________  Date:  Please sign and return

## 2020-11-09 NOTE — PROGRESS NOTES
[address]  PHYSICAL THERAPY EVALUATION    Date: 2020  Patient Name: Darren Middleton       MRN: [id number]   Account: [de-identified]   : 1940  (68 y.o.)   Gender: female   Referring Practitioner: Carolyn Jackson                 Diagnosis: R unilateral knee replacement, pain  Treatment Diagnosis: R knee pain with functional deficits. Additional Pertinent Hx: Back surgery 2013 x 2, R THR -20, R knee 10-15-20, arthritis             Past Medical History:  has no past medical history on file. Past Surgical History:   has no past surgical history on file. Vital Signs  Patient Currently in Pain: Yes   Pain Screening  Patient Currently in Pain: Yes  Pain Assessment  Pain Assessment: 0-10  Pain Level: 7(worst 7/10 R knee)  Pain Location: Knee  Pain Orientation: Right  Pain Descriptors: Aching  Pain Frequency: Continuous  Pain Onset: Awakened from sleep  Functional Pain Assessment: Prevents or interferes with all active and some passive activities(50% normal function currently compared to 60% before surgery. Night pain and walking difficulty)         Lives With: Spouse  Type of Home: House  Home Layout: Two level;Bed/Bath upstairs  Home Access: Stairs to enter with rails  Entrance Stairs - Number of Steps: 1 plus many to bedroom  Entrance Stairs - Rails: Left  Bathroom Equipment: Built-in shower seat;Grab bars in shower  Home Equipment: Rolling walker  Receives Help From: Family  ADL Assistance: Needs assistance  Homemaking Assistance: Needs assistance  Ambulation Assistance: Independent  Transfer Assistance: Independent  Active : Yes(not driving at this time)  Mode of Transportation: Car  Occupation: Retired  Leisure & Hobbies: walking, [place]        Subjective:  Subjective: Pt presents to clinic without AD with gait deviations.   Comments: RTD 2-    Objective:      Balance  Posture: Good  Sitting - Static: Good  Sitting - Dynamic: Fair  Standing - Static: Fair  Standing - Dynamic: Fair  Single Leg Stance R Le  Single Leg Stance L Le         Ambulation 1  Surface: carpet  Device: No Device  Assistance: Independent  Quality of Gait: IR R LE intermittently  Gait Deviations: Slow Pily, Decreased step length  Distance: 50' x 2             Strength RLE  Comment: Hip 3-/5, knee 3+/5, ankle 4-/5. Pt guarded with all movements. Strength LLE  Comment: Hip 3/5, knee 4-/5, ankle 4/5   PROM RLE (degrees)  RLE General PROM: -3 ext, 95 Flex  AROM RLE (degrees)  RLE General AROM: Hip flex unable, abd unable, -12-94 Knee, ankle WNL     AROM LLE (degrees)  LLE General AROM: Hip flex 40, knee 0-110, ankle WNL        Observation/Palpation  Posture: Fair  Palpation: tenderness Gastroc and distal hamstring medially. Observation: Incision healing, inferior medial knee swelling R  Edema: R Knee and into ankle boggy edema  Scar: healing, no drainage. Bed mobility  Rolling to Left: Modified independent  Supine to Sit: Stand by assistance  Sit to Supine: Stand by assistance  Comment: Much effort required to elevate R leg on and off the mat. UE used at times to assist movement and increase momentum          Additional Measures  Flexibility: Hamsting flexibility -40°R, -38°L at 90/90 hip/knee position. Exercises:   Exercises  Exercise 1: WBAT, no leg press 4-6 weeks, closed chain ex preferred, 10-1 pound limit. Bike 3-4 weeks DOS 10-15, quad estim 4 weeks  Exercise 2: Nustep*/bike*  Exercise 3: ham curl*  Exercise 4: LAQ*  Exercise 5: rocker board*  Exercise 6: step up*  Exercise 7: sink ex's edcuated and given HEP 4 min  Exercise 20: HEP: Sink ex and seated ex  Modalities:  Modalities  Other: Intermittent compression R knee 51.5 cm pre treatment knee joint.   Manual:  Manual therapy  Joint mobilization: tibial glide*  PROM: R knee*  Soft Tissue Mobalization: gastroc and hamstring 5 min  *Indicates exercise,modality, or manual techniques to be initiated when appropriate  Assessment: Body structures, Functions, Activity limitations: Decreased functional mobility , Decreased endurance, Decreased posture, Increased pain, Decreased ROM, Decreased strength, Decreased balance  Assessment: The pt's impairments currently limit functional abilities by 86% including her abilities to  ambulate, perform recreational actvities, and perform household/work related duties without pain or limitations  Prognosis: Good  Discharge Recommendations: Continue to assess pending progress        Decision Making: Medium Complexity  History: Back surgery 2013 x 2, R THR 7-20, R knee 10-15-20, arthritis  Exam: LEFS 11/80= 14% functional  Clinical Presentation: evolving        Plan  Frequency/Duration:  Plan  Times per week: 2-3  Plan weeks: 6-8  Current Treatment Recommendations: Strengthening, ROM, Neuromuscular Re-education, Home Exercise Program, Manual Therapy - Soft Tissue Mobilization, Manual Therapy - Joint Manipulation, Functional Mobility Training, Transfer Training, Gait Training, Modalities, Stair training, Aquatics         Patient Education  New Education Provided: PT Education: Goals;PT Role;Plan of Care;Home Exercise Program  Patient Education: Written HEP provided    POST-PAIN     Pain Rating (0-10 pain scale):   7/10  Location and pain description same as pre-treatment unless indicated. Action: [x] NA  [] Call Physician  [] Perform HEP  [] Meds as prescribed    Evaluation and patient rights have been reviewed and patient agrees with plan of care.   Yes  [x]  No  []   Explain:       Castro Fall Risk Assessment  Risk Factor Scale  Score   History of Falls [] Yes  [x] No 25  0 0   Secondary Diagnosis [] Yes  [x] No 15  0 0   Ambulatory Aid [] Furniture  [] Crutches/cane/walker  [x] None/bedrest/wheelchair/nurse 30  15  0 0   IV/Heparin Lock [] Yes  [x] No 20  0 0   Gait/Transferring [] Impaired  [x] Weak  [] Normal/bedrest/immobile 20  10  0 10   Mental Status [] Forgets limitations  [x] Oriented to own ability 15  0 0      Total:10     Based on the Assessment score: check the appropriate box. [x]  No intervention needed   Low =   Score of 0-24  []  Use standard prevention interventions Moderate =  Score of 24-44   [] Discuss fall prevention strategies   [] Indicate moderate falls risk on eval  []  Use high risk prevention interventions High = Score of 45 and higher   [] Discuss fall prevention strategies   [] Provide supervision during treatment time    Goals  Short term goals  Time Frame for Short term goals: 4 weeks  Short term goal 1: Initiate written HEP for symptom management. Short term goal 2: Decrease R knee pain 50% to assist with improved functional gains. Short term goal 3: R knee PROM 0-100 deg to improve overall function. Long term goals  Time Frame for Long term goals : 8 weeks  Long term goal 1: Indep HEP for symptom management  Long term goal 2: Pt demo improved overall function by reporting greater than 90% per functional survey score  Long term goal 3: Improve R LE strength 4/5 to allow patient to recip stair climbing  Long term goal 4: Ambulate with least restrictive device safe/Indep community distances with min to no deviations.   Long term goal 5: Pain-free AROM knee 0-110 to WNL allowing an increase in ADL tolerance         PT Individual Minutes  Time In: 0905  Time Out: 9466  Minutes: 50  Timed Code Treatment Minutes: 10 Minutes  Procedure Minutes:10 min Intermittent compression and 30 min Eval     Timed Activity Minutes Units   Ther Ex 5 1   Manual  5 0       Electronically signed by Christine Dunn PT on 11/9/20 at 9:58 AM EST

## 2020-11-11 ENCOUNTER — HOSPITAL ENCOUNTER (OUTPATIENT)
Dept: PHYSICAL THERAPY | Age: 80
Setting detail: THERAPIES SERIES
Discharge: HOME | End: 2020-11-11
Payer: MEDICARE

## 2020-11-11 PROCEDURE — 97140 MANUAL THERAPY 1/> REGIONS: CPT

## 2020-11-11 PROCEDURE — 97016 VASOPNEUMATIC DEVICE THERAPY: CPT

## 2020-11-11 PROCEDURE — 97110 THERAPEUTIC EXERCISES: CPT

## 2020-11-11 ASSESSMENT — PAIN DESCRIPTION - ORIENTATION: ORIENTATION: RIGHT

## 2020-11-11 ASSESSMENT — PAIN DESCRIPTION - LOCATION: LOCATION: KNEE

## 2020-11-11 ASSESSMENT — PAIN SCALES - GENERAL: PAINLEVEL_OUTOF10: 8

## 2020-11-11 ASSESSMENT — PAIN DESCRIPTION - DESCRIPTORS: DESCRIPTORS: ACHING

## 2020-11-11 ASSESSMENT — PAIN DESCRIPTION - FREQUENCY: FREQUENCY: CONTINUOUS

## 2020-11-11 NOTE — PROGRESS NOTES
[address] [address]  Outpatient Physical Therapy    Treatment Note        Date: 2020  Patient: Shari Krabbe  : 1940  ACCT #: [de-identified]  Referring Practitioner: Toni Chapin  Diagnosis: R unilateral knee replacement, pain    Visit Information:  PT Visit Information  Onset Date: 10/15/20  PT Insurance Information: [employer] Medicare  Total # of Visits Approved: (BMN)  Total # of Visits to Date: 2  Plan of Care/Certification Expiration Date: 21  No Show: 0  Canceled Appointment: 0  Progress Note Counter: 2/10 (PN 20)    Subjective: Pt with complaints of knee swelling and pain. Reports doing doing yard work with sitting in a chair mostly. Comments: RTD 21  HEP Compliance:  [x] Good [] Fair [] Poor [] Reports not doing due to:    Vital Signs  Patient Currently in Pain: Yes   Pain Screening  Patient Currently in Pain: Yes  Pain Assessment  Pain Level: 8  Pain Location: Knee  Pain Orientation: Right  Pain Descriptors: Aching  Pain Frequency: Continuous    OBJECTIVE:   Exercises  Exercise 1: WBAT, no leg press 4-6 weeks, closed chain ex preferred, 10-1 pound limit.  Bike 3-4 weeks DOS 10-15, quad estim 4 weeks  Exercise 2: Nustep L 1 5 min  Exercise 3: ham curl YTB x 15  Exercise 4: LAQ*  Exercise 5: rocker board*  Exercise 6: step up*  Exercise 7: sink ex's edcuated and given HEP 4 min  Exercise 8: heel slide with blet 5 s x 15  Exercise 9: R ankle yellow tband x 10  Exercise 20: HEP: Sink ex and seated ex       Strength: [] NT  [x] MMT completed:        ROM: [] NT  [] ROM measurements:  PROM RLE (degrees)  RLE General PROM: -3-105 knee  AROM RLE (degrees)  RLE General AROM: -5-100 Knee     AROM LLE (degrees)  LLE General AROM: Hip flex 40, knee 0-110, ankle WNL      Manual:   Manual therapy  Joint mobilization: tibial glide*  PROM: R knee flex 5  Soft Tissue Mobalization: gastroc and hamstring 10 min    Modalities:  Modalities  Other: Intermittent compression R knee 53.0 cm pre Therapy - Soft Tissue Mobilization, Manual Therapy - Joint Manipulation, Functional Mobility Training, Transfer Training, Gait Training, Modalities, Stair training, Aquatics     Pt to continue current HEP. See objective section for any therapeutic exercise changes, additions or modifications this date.          PT Individual Minutes  Time In: 1120  Time Out: 0914  Minutes: 52  Timed Code Treatment Minutes: 42 Minutes  Procedure Minutes:10 min gameready     Timed Activity Minutes Units   Ther Ex 27 2   Manual  15 1       Signature:  Electronically signed by Марина Weems PT on 11/11/20 at 12:39 PM EST

## 2020-11-13 ENCOUNTER — HOSPITAL ENCOUNTER (OUTPATIENT)
Dept: PHYSICAL THERAPY | Age: 80
Setting detail: THERAPIES SERIES
Discharge: HOME | End: 2020-11-13
Payer: MEDICARE

## 2020-11-13 PROCEDURE — 97016 VASOPNEUMATIC DEVICE THERAPY: CPT

## 2020-11-13 PROCEDURE — 97110 THERAPEUTIC EXERCISES: CPT

## 2020-11-13 PROCEDURE — 97140 MANUAL THERAPY 1/> REGIONS: CPT

## 2020-11-13 ASSESSMENT — PAIN DESCRIPTION - DESCRIPTORS: DESCRIPTORS: ACHING

## 2020-11-13 ASSESSMENT — PAIN DESCRIPTION - ORIENTATION: ORIENTATION: RIGHT

## 2020-11-13 ASSESSMENT — PAIN DESCRIPTION - FREQUENCY: FREQUENCY: CONTINUOUS

## 2020-11-13 ASSESSMENT — PAIN SCALES - GENERAL: PAINLEVEL_OUTOF10: 8

## 2020-11-13 ASSESSMENT — PAIN DESCRIPTION - LOCATION: LOCATION: KNEE

## 2020-11-13 NOTE — PROGRESS NOTES
[address] [address]  Outpatient Physical Therapy    Treatment Note        Date: 2020  Patient: Karen Barros  : 1940  ACCT #: [de-identified]  Referring Practitioner: Annabella Almeida  Diagnosis: R unilateral knee replacement, pain    Visit Information:  PT Visit Information  Onset Date: 10/15/20  PT Insurance Information: [employer]na Medicare  Total # of Visits Approved: (BMN)  Total # of Visits to Date: 3  Plan of Care/Certification Expiration Date: 21  No Show: 0  Canceled Appointment: 0  Progress Note Counter: 3/10 (PN 20)    Subjective: Pt reports felt great after last visit and was able to sleep. Pain and tightness slowly strating to return. Comments: RTD 21  HEP Compliance:  [x] Good [] Fair [] Poor [] Reports not doing due to:    Vital Signs  Patient Currently in Pain: Yes   Pain Screening  Patient Currently in Pain: Yes  Pain Assessment  Pain Level: 8  Pain Location: Knee  Pain Orientation: Right  Pain Descriptors: Aching  Pain Frequency: Continuous    OBJECTIVE:   Exercises  Exercise 1: WBAT, no leg press 4-6 weeks, closed chain ex preferred, 10-15 pound limit.  Bike 3-4 weeks DOS 10-15, quad estim 4 weeks  Exercise 2: Nustep L 2 5 min, LE's only  Exercise 3: ham curl YTB x 15  Exercise 4: LAQ*  Exercise 5: rocker board x 15 side to side only  Exercise 6: step up*  Exercise 7: sit to stand x12  Exercise 8: heel slide with blue strap 5 s x 15  Exercise 9: R ankle yellow tband x 15  Exercise 10: gastroc stretch with towel 20 s x3 (attempted standing  and off of step with increase pain)  Exercise 20: HEP: Sink ex and seated ex      Strength: [x] NT  [] MMT completed:     ROM: [x] NT  [] ROM measurements:         Manual:   Manual therapy  Joint mobilization: tibial glide*  PROM: R knee flex 5  Soft Tissue Mobalization: gastroc and hamstring 10 min    Modalities:  Modalities  Other: Intermittent compression R knee 53.5 cm pre treatment knee joint and 52.5 cm post circumferential *Indicates exercise, modality, or manual techniques to be initiated when appropriate    Assessment: Body structures, Functions, Activity limitations: Decreased functional mobility , Decreased endurance, Decreased posture, Increased pain, Decreased ROM, Decreased strength, Decreased balance  Assessment: Pt tolerated manual with decreased pain reported. Initiated sit to stand and gastroc stretch with multiple modifications needed to perform without increased pain in other areas. Treatment Diagnosis: R knee pain with functional deficits. Prognosis: Good       Goals:  Short term goals  Time Frame for Short term goals: 4 weeks  Short term goal 1: Initiate written HEP for symptom management. Short term goal 2: Decrease R knee pain 50% to assist with improved functional gains. Short term goal 3: R knee PROM 0-100 deg to improve overall function. Long term goals  Time Frame for Long term goals : 8 weeks  Long term goal 1: Indep HEP for symptom management  Long term goal 2: Pt demo improved overall function by reporting greater than 90% per functional survey score  Long term goal 3: Improve R LE strength 4/5 to allow patient to recip stair climbing  Long term goal 4: Ambulate with least restrictive device safe/Indep community distances with min to no deviations. Long term goal 5: Pain-free AROM knee 0-110 to WNL allowing an increase in ADL tolerance  Progress toward goals:ongoing, working on ROM and strengthening to improve overall stability    POST-PAIN       Pain Rating (0-10 pain scale):  3 /10   Location and pain description same as pre-treatment unless indicated.    Action: [x] NA   [] Perform HEP  [] Meds as prescribed  [] Modalities as prescribed   [] Call Physician     Frequency/Duration:  Plan  Times per week: 2-3  Plan weeks: 6-8  Current Treatment Recommendations: Strengthening, ROM, Neuromuscular Re-education, Home Exercise Program, Manual Therapy - Soft Tissue Mobilization, Manual Therapy - Joint Manipulation, Functional Mobility Training, Transfer Training, Gait Training, Modalities, Stair training, Aquatics     Pt to continue current HEP. See objective section for any therapeutic exercise changes, additions or modifications this date.          PT Individual Minutes  Time In: 1340  Time Out: 3138  Minutes: 56  Timed Code Treatment Minutes: 42 Minutes  Procedure Minutes:game ready 10 min     Timed Activity Minutes Units   Ther Ex 27 2   Manual  15 1       Signature:  Electronically signed by Nasrin Roberts PT on 11/13/20 at 2:47 PM EST

## 2020-11-16 ENCOUNTER — HOSPITAL ENCOUNTER (OUTPATIENT)
Dept: PHYSICAL THERAPY | Age: 80
Setting detail: THERAPIES SERIES
Discharge: HOME | End: 2020-11-16
Payer: MEDICARE

## 2020-11-16 PROCEDURE — 97140 MANUAL THERAPY 1/> REGIONS: CPT

## 2020-11-16 PROCEDURE — 97016 VASOPNEUMATIC DEVICE THERAPY: CPT

## 2020-11-16 PROCEDURE — 97110 THERAPEUTIC EXERCISES: CPT

## 2020-11-16 ASSESSMENT — PAIN SCALES - GENERAL: PAINLEVEL_OUTOF10: 8

## 2020-11-16 ASSESSMENT — PAIN DESCRIPTION - ORIENTATION: ORIENTATION: RIGHT

## 2020-11-16 ASSESSMENT — PAIN DESCRIPTION - DESCRIPTORS: DESCRIPTORS: ACHING;SORE

## 2020-11-16 ASSESSMENT — PAIN DESCRIPTION - FREQUENCY: FREQUENCY: CONTINUOUS

## 2020-11-16 ASSESSMENT — PAIN DESCRIPTION - LOCATION: LOCATION: KNEE

## 2020-11-16 NOTE — PROGRESS NOTES
min    Modalities:  Modalities  Other: Intermittent compression R knee for edema control 53.6 cm pre treatment knee joint and 53.1 cm post circumferential     *Indicates exercise, modality, or manual techniques to be initiated when appropriate    Assessment: Body structures, Functions, Activity limitations: Decreased functional mobility , Decreased endurance, Decreased posture, Increased pain, Decreased ROM, Decreased strength, Decreased balance  Assessment: Pt with decreased mm tightness and pain post manual.  Pt tolerated increase reps of some ex's, addition of two for quad strengthening. Utilized mirror during sit to stand for improved wt shifting. Treatment Diagnosis: R knee pain with functional deficits. Prognosis: Good       Goals:  Short term goals  Time Frame for Short term goals: 4 weeks  Short term goal 1: Initiate written HEP for symptom management. Short term goal 2: Decrease R knee pain 50% to assist with improved functional gains. Short term goal 3: R knee PROM 0-100 deg to improve overall function. Long term goals  Time Frame for Long term goals : 8 weeks  Long term goal 1: Indep HEP for symptom management  Long term goal 2: Pt demo improved overall function by reporting greater than 90% per functional survey score  Long term goal 3: Improve R LE strength 4/5 to allow patient to recip stair climbing  Long term goal 4: Ambulate with least restrictive device safe/Indep community distances with min to no deviations. Long term goal 5: Pain-free AROM knee 0-110 to WNL allowing an increase in ADL tolerance  Progress toward goals:ongoing, working on ROM and strengthening to improve overall stability      POST-PAIN       Pain Rating (0-10 pain scale):  3 /10   Location and pain description same as pre-treatment unless indicated.    Action: [x] NA   [] Perform HEP  [] Meds as prescribed  [] Modalities as prescribed   [] Call Physician     Frequency/Duration:  Plan  Times per week: 2-3  Plan weeks: 6-8  Current Treatment Recommendations: Strengthening, ROM, Neuromuscular Re-education, Home Exercise Program, Manual Therapy - Soft Tissue Mobilization, Manual Therapy - Joint Manipulation, Functional Mobility Training, Transfer Training, Gait Training, Modalities, Stair training, Aquatics     Pt to continue current HEP. See objective section for any therapeutic exercise changes, additions or modifications this date.     PT Individual Minutes  Time In: 0840  Time Out: 3751  Minutes: 53  Timed Code Treatment Minutes: 39 Minutes  Procedure Minutes:10 min Game ready     Timed Activity Minutes Units   Ther Ex 31 2   Manual  18 1       Signature:  Electronically signed by Lesley Hardin PT on 11/16/20 at 3:51 PM EST

## 2020-11-18 ENCOUNTER — HOSPITAL ENCOUNTER (OUTPATIENT)
Dept: PHYSICAL THERAPY | Age: 80
Setting detail: THERAPIES SERIES
Discharge: HOME | End: 2020-11-18
Payer: MEDICARE

## 2020-11-18 PROCEDURE — 97110 THERAPEUTIC EXERCISES: CPT

## 2020-11-18 PROCEDURE — 97016 VASOPNEUMATIC DEVICE THERAPY: CPT

## 2020-11-18 PROCEDURE — 97140 MANUAL THERAPY 1/> REGIONS: CPT

## 2020-11-18 NOTE — PROGRESS NOTES
[address] [address]  Outpatient Physical Therapy    Treatment Note        Date: 2020  Patient: Terese Yadav  : 1940  ACCT #: [de-identified]  Referring Practitioner: Wyatt Estrada  Diagnosis: R unilateral knee replacement, pain    Visit Information:  PT Visit Information  Onset Date: 10/15/20  PT Insurance Information: [employer] Medicare  Total # of Visits Approved: (BMN)  Total # of Visits to Date: 5  Plan of Care/Certification Expiration Date: 21  No Show: 0  Canceled Appointment: 0  Progress Note Counter: 5/10 (PN 20)    Subjective: Pt reports knee is sore today however was feeling better most of the day after last visit. Comments: RTD 21  HEP Compliance:  [x] Good [] Fair [] Poor [] Reports not doing due to:    Vital Signs  Patient Currently in Pain: Yes   Pain Screening  Patient Currently in Pain: Yes    OBJECTIVE:   Exercises  Exercise 1: WBAT, no leg press 4-6 weeks, closed chain ex preferred, 10-15 pound limit. Bike 3-4 weeks DOS 10-15, quad estim 4 weeks  Exercise 2: Nustep L 2 5 min, LE's only  Exercise 3: ham curl RTB x 15  Exercise 4: LAQ 5 s x 15 1#  Exercise 5: rocker board x 15 side to side only  Exercise 6: step up 4 \" x 10  Exercise 7: sit to stand x12 in front  Exercise 8: heel slide with blue strap 5 s x 15  Exercise 9: gait walk and step over 5 laps. Exercise 20: HEP: Sink ex and seated ex          Strength: [x] NT  [] MMT completed:     ROM: [] NT  [x] ROM measurements:   AROM LLE (degrees)  LLE General AROM: knee 0-110      Manual:   Manual therapy  Joint mobilization: patellar mob 1 min  PROM: R knee flex 5  Soft Tissue Mobalization: gastroc and hamstring 10 min    Modalities:  Modalities  Other: Intermittent compression R knee for edema control 52.6 cm pre treatment knee joint and 53.8 cm post circumferential     *Indicates exercise, modality, or manual techniques to be initiated when appropriate    Assessment:       Body structures, Functions, Activity current HEP. See objective section for any therapeutic exercise changes, additions or modifications this date.   PT Individual Minutes  Time In: 1000  Time Out: 6541  Minutes: 55  Timed Code Treatment Minutes: 39 Minutes  Procedure Minutes:game ready 10 min     Timed Activity Minutes Units   Ther Ex 23 2   Manual  16 1       Signature:  Electronically signed by John Morley PT on 11/18/20 at 10:33 AM EST

## 2020-11-20 ENCOUNTER — HOSPITAL ENCOUNTER (OUTPATIENT)
Dept: PHYSICAL THERAPY | Age: 80
Setting detail: THERAPIES SERIES
Discharge: HOME | End: 2020-11-20
Payer: MEDICARE

## 2020-11-20 PROCEDURE — 97140 MANUAL THERAPY 1/> REGIONS: CPT

## 2020-11-20 PROCEDURE — 97110 THERAPEUTIC EXERCISES: CPT

## 2020-11-20 ASSESSMENT — PAIN DESCRIPTION - FREQUENCY: FREQUENCY: CONTINUOUS

## 2020-11-20 ASSESSMENT — PAIN DESCRIPTION - LOCATION: LOCATION: KNEE

## 2020-11-20 ASSESSMENT — PAIN DESCRIPTION - DESCRIPTORS: DESCRIPTORS: ACHING

## 2020-11-20 ASSESSMENT — PAIN DESCRIPTION - ORIENTATION: ORIENTATION: RIGHT

## 2020-11-20 ASSESSMENT — PAIN SCALES - GENERAL: PAINLEVEL_OUTOF10: 5

## 2020-11-20 NOTE — PROGRESS NOTES
[address] [address]  Outpatient Physical Therapy    Treatment Note        Date: 2020  Patient: Aarti Porras  : 1940  ACCT #: [de-identified]  Referring Practitioner: Estephania Madrid  Diagnosis: R unilateral knee replacement, pain    Visit Information:  PT Visit Information  Onset Date: 10/15/20  PT Insurance Information: [employer] Medicare  Total # of Visits Approved: (BMN)  Total # of Visits to Date: 6  Plan of Care/Certification Expiration Date: 21  No Show: 0  Canceled Appointment: 0  Progress Note Counter: 6/10 (PN 20)    Subjective: Pt reports having a rash in the R stomach fold and wants to hold off on initiating aquatics. Comments: RTD 21  HEP Compliance:  [x] Good [] Fair [] Poor [] Reports not doing due to:    Vital Signs  Patient Currently in Pain: Yes   Pain Screening  Patient Currently in Pain: Yes  Pain Assessment  Pain Level: 5  Pain Location: Knee  Pain Orientation: Right  Pain Descriptors: Aching  Pain Frequency: Continuous    OBJECTIVE:   Exercises  Exercise 1: WBAT, no leg press 4-6 weeks, closed chain ex preferred, 10-15 pound limit. Bike 3-4 weeks DOS 10-15, quad estim 4 weeks  Exercise 2: Nustep L 2 5 min, LE's only  Exercise 3: ham curl RTB x 15  Exercise 4: LAQ 5 s x 15 1#  Exercise 5: rocker board x 15 side to side only  Exercise 6: step up 4 \" x 10  Exercise 7: sit to stand x12 in front  Exercise 8: heel slide with blue strap 5 s x 15  Exercise 9: gait walk and step over 5 laps. Exercise 20: HEP: Sink ex and seated ex       Strength: [x] NT  [] MMT completed:        ROM: [x] NT  [] ROM measurements:         Manual:   Manual therapy  Joint mobilization: patellar mob 1 mi  PROM: R knee flex 5  Soft Tissue Mobalization: gastroc and hamstring 10 min    Modalities:  Modalities  Cryotherapy (Minutes\Location): 10 min CP for swelling and pain control     *Indicates exercise, modality, or manual techniques to be initiated when appropriate    Assessment:       Body structures, Functions, Activity limitations: Decreased functional mobility , Decreased endurance, Decreased posture, Increased pain, Decreased ROM, Decreased strength, Decreased balance  Assessment: Pt tolerated therapy well without increase in pain. Pt attempted to perform sit to atand from lower position however increase R knee pain and unable to complete. Tolerated 4\" step without pain or difficulty. Consider increase to 6\" next visit. Treatment Diagnosis: R knee pain with functional deficits. Prognosis: Good       Goals:  Short term goals  Time Frame for Short term goals: 4 weeks  Short term goal 1: Initiate written HEP for symptom management. Short term goal 2: Decrease R knee pain 50% to assist with improved functional gains. Short term goal 3: R knee PROM 0-100 deg to improve overall function. Long term goals  Time Frame for Long term goals : 8 weeks  Long term goal 1: Indep HEP for symptom management  Long term goal 2: Pt demo improved overall function by reporting greater than 90% per functional survey score  Long term goal 3: Improve R LE strength 4/5 to allow patient to recip stair climbing  Long term goal 4: Ambulate with least restrictive device safe/Indep community distances with min to no deviations. Long term goal 5: Pain-free AROM knee 0-110 to WNL allowing an increase in ADL tolerance  Progress toward goals:ongoing, working on ROM and strengthening to improve overall stability      POST-PAIN       Pain Rating (0-10 pain scale):   3/10   Location and pain description same as pre-treatment unless indicated.    Action: [x] NA   [] Perform HEP  [] Meds as prescribed  [] Modalities as prescribed   [] Call Physician     Frequency/Duration:  Plan  Times per week: 2-3  Plan weeks: 6-8  Current Treatment Recommendations: Strengthening, ROM, Neuromuscular Re-education, Home Exercise Program, Manual Therapy - Soft Tissue Mobilization, Manual Therapy - Joint Manipulation, Functional Mobility Training, Transfer Training, Gait Training, Modalities, Stair training, Aquatics     Pt to continue current HEP. See objective section for any therapeutic exercise changes, additions or modifications this date.     PT Individual Minutes  Time In: 0840  Time Out: 7656  Minutes: 53  Timed Code Treatment Minutes: 40 Minutes  Procedure Minutes:10 min CP     Timed Activity Minutes Units   Ther Ex 25 2   Manual  15 1       Signature:  Electronically signed by Christopher Mcghee PT on 11/20/20 at 3:11 PM EST

## 2020-11-23 ENCOUNTER — HOSPITAL ENCOUNTER (OUTPATIENT)
Dept: PHYSICAL THERAPY | Age: 80
Setting detail: THERAPIES SERIES
Discharge: HOME | End: 2020-11-23
Payer: MEDICARE

## 2020-11-23 PROCEDURE — 97140 MANUAL THERAPY 1/> REGIONS: CPT

## 2020-11-23 PROCEDURE — 97110 THERAPEUTIC EXERCISES: CPT

## 2020-11-23 ASSESSMENT — PAIN SCALES - GENERAL: PAINLEVEL_OUTOF10: 7

## 2020-11-23 ASSESSMENT — PAIN DESCRIPTION - ORIENTATION: ORIENTATION: RIGHT

## 2020-11-23 ASSESSMENT — PAIN DESCRIPTION - DESCRIPTORS: DESCRIPTORS: ACHING

## 2020-11-23 ASSESSMENT — PAIN DESCRIPTION - LOCATION: LOCATION: KNEE

## 2020-11-23 NOTE — PROGRESS NOTES
when appropriate    Assessment: Body structures, Functions, Activity limitations: Decreased functional mobility , Decreased endurance, Decreased posture, Increased pain, Decreased ROM, Decreased strength, Decreased balance  Assessment: Pt tolerated increase reps and resistance to tolerance. Pt able to recip stair climb 6\" steps with 2 rails  Treatment Diagnosis: R knee pain with functional deficits. Prognosis: Good     Goals:  Short term goals  Time Frame for Short term goals: 4 weeks  Short term goal 1: Initiate written HEP for symptom management. Short term goal 2: Decrease R knee pain 50% to assist with improved functional gains. Short term goal 3: R knee PROM 0-100 deg to improve overall function. Long term goals  Time Frame for Long term goals : 8 weeks  Long term goal 1: Indep HEP for symptom management  Long term goal 2: Pt demo improved overall function by reporting greater than 90% per functional survey score  Long term goal 3: Improve R LE strength 4/5 to allow patient to recip stair climbing  Long term goal 4: Ambulate with least restrictive device safe/Indep community distances with min to no deviations. Long term goal 5: Pain-free AROM knee 0-110 to WNL allowing an increase in ADL tolerance  Progress toward goals:ongoing, working on ROM and strengthening to improve overall stability      POST-PAIN       Pain Rating (0-10 pain scale):   3/10   Location and pain description same as pre-treatment unless indicated.    Action: [x] NA   [] Perform HEP  [] Meds as prescribed  [] Modalities as prescribed   [] Call Physician     Frequency/Duration:  Plan  Times per week: 2-3  Plan weeks: 6-8  Current Treatment Recommendations: Strengthening, ROM, Neuromuscular Re-education, Home Exercise Program, Manual Therapy - Soft Tissue Mobilization, Manual Therapy - Joint Manipulation, Functional Mobility Training, Transfer Training, Gait Training, Modalities, Stair training, Aquatics     Pt to continue current HEP.  See objective section for any therapeutic exercise changes, additions or modifications this date.     PT Individual Minutes  Time In: 1120  Time Out: 2144  Minutes: 57  Timed Code Treatment Minutes: 45 Minutes  Procedure Minutes:10 min CP     Timed Activity Minutes Units   Ther Ex 32 2   Manual  13 1       Signature:  Electronically signed by Louie Whitfield PT on 11/23/20 at 2:32 PM EST

## 2020-11-25 ENCOUNTER — HOSPITAL ENCOUNTER (OUTPATIENT)
Dept: PHYSICAL THERAPY | Age: 80
Setting detail: THERAPIES SERIES
Discharge: HOME | End: 2020-11-25
Payer: MEDICARE

## 2020-11-25 PROCEDURE — 97110 THERAPEUTIC EXERCISES: CPT

## 2020-11-25 PROCEDURE — 97140 MANUAL THERAPY 1/> REGIONS: CPT

## 2020-11-25 ASSESSMENT — PAIN DESCRIPTION - LOCATION: LOCATION: KNEE

## 2020-11-25 ASSESSMENT — PAIN DESCRIPTION - ORIENTATION: ORIENTATION: RIGHT

## 2020-11-25 ASSESSMENT — PAIN DESCRIPTION - DESCRIPTORS: DESCRIPTORS: ACHING

## 2020-11-25 ASSESSMENT — PAIN SCALES - GENERAL: PAINLEVEL_OUTOF10: 7

## 2020-11-25 NOTE — PROGRESS NOTES
[address] [address]  Outpatient Physical Therapy    Treatment Note        Date: 2020  Patient: Alberto Canales  : 1940  ACCT #: [de-identified]  Referring Practitioner: Dillan Goss  Diagnosis: R unilateral knee replacement, pain    Visit Information:  PT Visit Information  Onset Date: 10/15/20  PT Insurance Information: [employer] Medicare  Total # of Visits Approved: (BMN)  Total # of Visits to Date: 8  Plan of Care/Certification Expiration Date: 21  No Show: 0  Canceled Appointment: 0  Progress Note Counter: 8/10 (PN 20)    Subjective: Pt reports pain started increasing last night. Overall improving function however pain continues to spike after activity and disturbing sleep. Comments: RTD 21  HEP Compliance:  [x] Good [] Fair [] Poor [] Reports not doing due to:    Vital Signs  Patient Currently in Pain: Yes   Pain Screening  Patient Currently in Pain: Yes  Pain Assessment  Pain Level: 7  Pain Location: Knee  Pain Orientation: Right  Pain Descriptors: Aching    OBJECTIVE:   Exercises  Exercise 1: WBAT, no leg press 4-6 weeks, closed chain ex preferred, 10-15 pound limit. Bike 3-4 weeks DOS 10-15, quad estim 4 weeks  Exercise 2: Nustep L4  5 min, LE's only  Exercise 3: ham curl GTB x 15  Exercise 4: LAQ 3s x 3 # x 15  Exercise 5: rocker board x 15 side to side only  Exercise 6: standing 4 way SLR with YTB x 5 reps B  Exercise 7: sit to stand x10 @18\" with RTB above knees  Exercise 9: gait walk and step over 5 laps. Exercise 11: seated march RTB x 15  Exercise 20: HEP: Sink ex and seated ex         Strength: [] NT  [x] MMT completed:  Strength RLE  Comment: Hip 3/5, knee 4/5 Quad, Ham 3+/5,  Pt guarded with all movements.      ROM: [] NT  [] ROM measurements:   AROM LLE (degrees)  LLE General AROM: knee 0-110   Manual:   Manual therapy  Joint mobilization: patellar mob 1 min  Soft Tissue Mobalization: gastroc and hamstring 8 min    Modalities:  Modalities  Cryotherapy (Minutes\Location): 10 min CP for swelling and pain control     *Indicates exercise, modality, or manual techniques to be initiated when appropriate    Assessment: Body structures, Functions, Activity limitations: Decreased functional mobility , Decreased endurance, Decreased posture, Increased pain, Decreased ROM, Decreased strength, Decreased balance  Assessment: Pt progressing toward goals. Cont to have complaints of pain limiting function. 4 way SLR  added this date B for hip strengthening and wt bearing progresion. Pt conclude with CP/manual with less pain. Treatment Diagnosis: R knee pain with functional deficits. Prognosis: Good       Goals:  Short term goals  Time Frame for Short term goals: 4 weeks  Short term goal 1: Initiate written HEP for symptom management. Short term goal 2: Decrease R knee pain 50% to assist with improved functional gains. Short term goal 3: R knee PROM 0-100 deg to improve overall function. Long term goals  Time Frame for Long term goals : 8 weeks  Long term goal 1: Indep HEP for symptom management  Long term goal 2: Pt demo improved overall function by reporting greater than 90% per functional survey score  Long term goal 3: Improve R LE strength 4/5 to allow patient to recip stair climbing  Long term goal 4: Ambulate with least restrictive device safe/Indep community distances with min to no deviations. Long term goal 5: Pain-free AROM knee 0-110 to WNL allowing an increase in ADL tolerance  Progress toward goals:ongoing, working on ROM and strengthening to improve overall stability      POST-PAIN       Pain Rating (0-10 pain scale):  3 /10   Location and pain description same as pre-treatment unless indicated.    Action: [x] NA   [] Perform HEP  [] Meds as prescribed  [] Modalities as prescribed   [] Call Physician     Frequency/Duration:  Plan  Times per week: 2-3  Plan weeks: 6-8  Current Treatment Recommendations: Strengthening, ROM, Neuromuscular Re-education, Home Exercise Program, Manual Therapy - Soft Tissue Mobilization, Manual Therapy - Joint Manipulation, Functional Mobility Training, Transfer Training, Gait Training, Modalities, Stair training, Aquatics     Pt to continue current HEP. See objective section for any therapeutic exercise changes, additions or modifications this date.          PT Individual Minutes  Time In: 1000  Time Out: 1050  Minutes: 50  Timed Code Treatment Minutes: 38 Minutes  Procedure Minutes:10 min CP     Timed Activity Minutes Units   Ther Ex 29 2   Manual  9 1       Signature:  Electronically signed by Louie Whitfield PT on 11/25/20 at 12:58 PM EST

## 2020-11-30 ENCOUNTER — HOSPITAL ENCOUNTER (OUTPATIENT)
Dept: PHYSICAL THERAPY | Age: 80
Setting detail: THERAPIES SERIES
Discharge: HOME | End: 2020-11-30
Payer: MEDICARE

## 2020-11-30 PROCEDURE — 97140 MANUAL THERAPY 1/> REGIONS: CPT

## 2020-11-30 PROCEDURE — 97110 THERAPEUTIC EXERCISES: CPT

## 2020-11-30 ASSESSMENT — PAIN DESCRIPTION - FREQUENCY: FREQUENCY: CONTINUOUS

## 2020-11-30 ASSESSMENT — PAIN SCALES - GENERAL: PAINLEVEL_OUTOF10: 5

## 2020-11-30 ASSESSMENT — PAIN DESCRIPTION - ORIENTATION: ORIENTATION: RIGHT

## 2020-11-30 ASSESSMENT — PAIN DESCRIPTION - DESCRIPTORS: DESCRIPTORS: ACHING

## 2020-11-30 ASSESSMENT — PAIN DESCRIPTION - LOCATION: LOCATION: KNEE

## 2020-11-30 NOTE — PROGRESS NOTES
[address] [address]  Outpatient Physical Therapy    Treatment Note        Date: 2020  Patient: Enedina Banks  : 1940  ACCT #: [de-identified]  Referring Practitioner: Leland Aranda  Diagnosis: R unilateral knee replacement, pain    Visit Information:  PT Visit Information  Onset Date: 10/15/20  PT Insurance Information: [employer] Medicare  Total # of Visits Approved: (BMN)  Total # of Visits to Date: 9  Plan of Care/Certification Expiration Date: 21  No Show: 0  Canceled Appointment: 0  Progress Note Counter: 9/10 (PN 20)    Subjective: Pt reports trying to not use the raised toilet seat yesterdaywith increased difficult  Comments: RTD 21  HEP Compliance:  [x] Good [] Fernando Mosley [] Poor [] Reports not doing due to:    Vital Signs  Patient Currently in Pain: Yes   Pain Screening  Patient Currently in Pain: Yes  Pain Assessment  Pain Level: 5  Pain Location: Knee  Pain Orientation: Right  Pain Descriptors: Aching  Pain Frequency: Continuous    OBJECTIVE:   Exercises  Exercise 1: WBAT, no leg press 4-6 weeks, closed chain ex preferred, 10-15 pound limit. Bike 3-4 weeks DOS 10-15, quad estim 4 weeks  Exercise 2: Nustep L4  5 min, LE's only  Exercise 3: ham curl GTB x 15  Exercise 4: LAQ 3s x 3 # x 15  Exercise 5: rocker board x 15 side to side only  Exercise 6: standing 4 way SLR with YTB x 5 reps B  Exercise 7: sit to stand x10 @18\" with RTB above knees  Exercise 9: gait walk and step over 5 laps. Exercise 11: seated march RTB x 15  Exercise 20: HEP: Sink ex and seated ex       Strength: [x] NT  [] MMT completed:     ROM: [x] NT  [] ROM measurements:            Manual:   Manual therapy  Joint mobilization: patellar mob 1 min  Soft Tissue Mobalization: gastroc and hamstring 8 min    Modalities:  Modalities  Cryotherapy (Minutes\Location): 10 min CP for swelling and pain control     *Indicates exercise, modality, or manual techniques to be initiated when appropriate    Assessment:       Body structures, Functions, Activity limitations: Decreased functional mobility , Decreased endurance, Decreased posture, Increased pain, Decreased ROM, Decreased strength, Decreased balance  Assessment: Pt tolerated therapy well with continued challenge with standing 4way SLR and limited reps. Overall decreasing mm tightness noted in LE. Pt cont to have pain with sit to stand while applying equal wt bearing in LE's. Treatment Diagnosis: R knee pain with functional deficits. Prognosis: Good       Goals:  Short term goals  Time Frame for Short term goals: 4 weeks  Short term goal 1: Initiate written HEP for symptom management. Short term goal 2: Decrease R knee pain 50% to assist with improved functional gains. Short term goal 3: R knee PROM 0-100 deg to improve overall function. Long term goals  Time Frame for Long term goals : 8 weeks  Long term goal 1: Indep HEP for symptom management  Long term goal 2: Pt demo improved overall function by reporting greater than 90% per functional survey score  Long term goal 3: Improve R LE strength 4/5 to allow patient to recip stair climbing  Long term goal 4: Ambulate with least restrictive device safe/Indep community distances with min to no deviations. Long term goal 5: Pain-free AROM knee 0-110 to WNL allowing an increase in ADL tolerance  Progress toward goals:ongoing, working on ROM and strengthening to improve overall stability      POST-PAIN       Pain Rating (0-10 pain scale):   2/10   Location and pain description same as pre-treatment unless indicated.    Action: [x] NA   [] Perform HEP  [] Meds as prescribed  [] Modalities as prescribed   [] Call Physician     Frequency/Duration:  Plan  Times per week: 2-3  Plan weeks: 6-8  Current Treatment Recommendations: Strengthening, ROM, Neuromuscular Re-education, Home Exercise Program, Manual Therapy - Soft Tissue Mobilization, Manual Therapy - Joint Manipulation, Functional Mobility Training, Transfer Training, Gait Training, Modalities, Stair training, Aquatics     Pt to continue current HEP. See objective section for any therapeutic exercise changes, additions or modifications this date.          PT Individual Minutes  Time In: 0690  Time Out: 2109  Minutes: 50  Timed Code Treatment Minutes: 39 Minutes  Procedure Minutes:10 min CP     Timed Activity Minutes Units   Ther Ex 30 2   Manual  9 1       Signature:  Electronically signed by Viraj Arias PT on 11/30/20 at 3:01 PM EST

## 2020-12-04 ENCOUNTER — HOSPITAL ENCOUNTER (OUTPATIENT)
Dept: PHYSICAL THERAPY | Age: 80
Setting detail: THERAPIES SERIES
Discharge: HOME | End: 2020-12-04
Payer: MEDICARE

## 2020-12-04 PROCEDURE — 97110 THERAPEUTIC EXERCISES: CPT

## 2020-12-04 PROCEDURE — 97140 MANUAL THERAPY 1/> REGIONS: CPT

## 2020-12-04 ASSESSMENT — PAIN DESCRIPTION - DESCRIPTORS: DESCRIPTORS: ACHING

## 2020-12-04 ASSESSMENT — PAIN DESCRIPTION - ORIENTATION: ORIENTATION: RIGHT

## 2020-12-04 ASSESSMENT — PAIN DESCRIPTION - LOCATION: LOCATION: KNEE

## 2020-12-04 ASSESSMENT — PAIN SCALES - GENERAL: PAINLEVEL_OUTOF10: 8

## 2020-12-04 NOTE — PROGRESS NOTES
[address] [address]  Outpatient Physical Therapy    Treatment Note        Date: 2020  Patient: Sheng Peguero  : 1940  ACCT #: [de-identified]  Referring Practitioner: Brenda Keller  Diagnosis: R unilateral knee replacement, pain    Visit Information:  PT Visit Information  Onset Date: 10/15/20  PT Insurance Information: [employer] Medicare  Total # of Visits Approved: (BMN)  Total # of Visits to Date: 10  Plan of Care/Certification Expiration Date: 21  Progress Note Due Date: 21  Canceled Appointment: 0  Progress Note Counter: 10/10 (next PN 1/10 due 20)    Subjective: Pt reports using the toilet without the riser without knowing it was gone and felt increased knee and hip pain, although pt reports pain was higher before that incident as well. Pt reports knee to ankle increased swelling. Pt reports not icing at home. Comments: RTD 21  HEP Compliance:  [x] Good [] Fair [] Poor [] Reports not doing due to:    Vital Signs  Patient Currently in Pain: Yes   Pain Screening  Patient Currently in Pain: Yes  Pain Assessment  Pain Level: 8  Pain Location: Knee  Pain Orientation: Right  Pain Descriptors: Aching    OBJECTIVE:   Exercises  Exercise 1: WBAT, no leg press 4-6 weeks, closed chain ex preferred, 10-15 pound limit. Bike 3-4 weeks DOS 10-15, quad estim 4 weeks  Exercise 2: Nustep L4  5 min, LE's only  Exercise 3: ham curl GTB x 15  Exercise 4: LAQ 3s x 3 # x 15  Exercise 5: rocker board x 15 side to side only  Exercise 6: standing 4 way SLR with YTB x 10 reps B  Exercise 7: sit to stand x10 @18\" with RTB above knees- held this date due to lateral knee pain.   Exercise 8: step up 6\" x 10  Exercise 9: lunge step on bosu x 10 B  Exercise 11:  RTB x 15  Exercise 20: HEP: Sink ex and seated ex     Ambulation 1  Surface: carpet  Device: No Device  Assistance: Independent  Gait Deviations: Slow Pily  Distance: umlimited in clinic         Stairs  # Steps : 8  Stairs Height: 6\"  Rails: Bilateral  Device: No Device  Assistance: Modified independent   Comment: recip up and non recip down. Strength: [] NT  [x] MMT completed:  Strength RLE  Comment: Hip 3+/5, knee 4/5 Quad, Ham 3+/5 to 4-/5     ROM: [] NT  [] ROM measurements:   AROM LLE (degrees)  LLE General AROM: knee 0-110         Manual:   Manual therapy  Joint mobilization: patellar mob 1 min  PROM: knee and hip 5 min  Soft Tissue Mobalization: gastroc and hamstring 8 min    Modalities:  Modalities  Cryotherapy (Minutes\Location): 10 min CP for swelling and pain control     *Indicates exercise, modality, or manual techniques to be initiated when appropriate    Assessment: Body structures, Functions, Activity limitations: Decreased functional mobility , Decreased endurance, Decreased posture, Increased pain, Decreased ROM, Decreased strength, Decreased balance  Assessment: Pt cont to struggle with sit to stand using equal wt shift with complaint of lat knee pain. Pt also reports R hip pain intermittently. Pt would benefit from continued PT for strengthening R knee and hip and improving wt bearing tolerance to activities. Treatment Diagnosis: R knee pain with functional deficits. Prognosis: Good       Goals:  Short term goals  Time Frame for Short term goals: 4 weeks  Short term goal 1: Initiate written HEP for symptom management. Short term goal 2: Decrease R knee pain 50% to assist with improved functional gains. Short term goal 3: R knee PROM 0-100 deg to improve overall function. Long term goals  Time Frame for Long term goals : 8 weeks  Long term goal 1: Indep HEP for symptom management  Long term goal 2: Pt demo improved overall function by reporting greater than 90% per functional survey score  Long term goal 3: Improve R LE strength 4/5 to allow patient to recip stair climbing  Long term goal 4: Ambulate with least restrictive device safe/Indep community distances with min to no deviations.   Long term goal 5: Pain-free AROM knee 0-110 to WNL allowing an increase in ADL tolerance  Progress toward goals:ongoing, working on ROM and strengthening to improve overall stability      POST-PAIN       Pain Rating (0-10 pain scale):  3-4 /10   Location and pain description same as pre-treatment unless indicated. Action: [x] NA   [] Perform HEP  [] Meds as prescribed  [] Modalities as prescribed   [] Call Physician     Frequency/Duration:  Plan  Times per week: 2-3  Plan weeks: 6-8  Current Treatment Recommendations: Strengthening, ROM, Neuromuscular Re-education, Home Exercise Program, Manual Therapy - Soft Tissue Mobilization, Manual Therapy - Joint Manipulation, Functional Mobility Training, Transfer Training, Gait Training, Modalities, Stair training, Aquatics     Pt to continue current HEP. See objective section for any therapeutic exercise changes, additions or modifications this date.          PT Individual Minutes  Time In: 2631  Time Out: 0079  Minutes: 52  Timed Code Treatment Minutes: 42 Minutes  Procedure Minutes:10 min CP     Timed Activity Minutes Units   Ther Ex 28 2   Manual  14 1       Signature:  Electronically signed by Madhuri Verma PT on 12/4/20 at 2:29 PM EST

## 2020-12-04 NOTE — PROGRESS NOTES
[address]     Ph: [phone]  Fax: [phone]    [] Certification  [] Recertification []  Plan of Care  [x] Progress Note [] Discharge      To:  [place] Stade      From:  Barbara Whitfield, PT  Patient: Dana Renae     : 1940  Diagnosis: R unilateral knee replacement, pain     Date: 2020  Treatment Diagnosis: R knee pain with functional deficits. Plan of Care/Certification Expiration Date: 21  Progress Report Period from:  2020  to 2020    Total # of Visits to Date: 8        Canceled Appointment: 0     OBJECTIVE:   Short Term Goals - Time Frame for Short term goals: 4 weeks    Goals Current/Discharge status  Met   Short term goal 1: Initiate written HEP for symptom management. Written HEP provided  for symptom management [x] yes  [] no   Short term goal 2: Decrease R knee pain 50% to assist with improved functional gains. Pain Location: Knee    Pain Level: 8   Pain Location: Knee    Pain Level: 8    Pain Descriptors: Aching     [] yes  [x] no   Short term goal 3: R knee PROM 0-100 deg to improve overall function. PROM LLE (degrees)  LLE General AROM: knee 0-110 [] yes  [] no     Long Term Goals - Time Frame for Long term goals : 8 weeks  Goals Current/ Discharge status Met   Long term goal 1: Indep HEP for symptom management Written HEP provided  for symptom management   [x] yes  [] no   Long term goal 2: Pt demo improved overall function by reporting greater than 90% per functional survey score  Pt reporting less than 75% normal function. [] yes  [x] no   Long term goal 3: Improve R LE strength 4/5 to allow patient to recip stair climbing Strength RLE  Comment: Hip 3+/5, knee 4/5 Quad, Ham 3+/5 to 4-/5      Stairs  # Steps : 8  Stairs Height: 6\"  Rails: Bilateral  Device: No Device  Assistance: Modified independent   Comment: recip up and non recip down.  [] yes  [x] no   Long term goal 4: Ambulate with least restrictive device safe/Indep community distances with min to no deviations. Ambulation 1  Surface: carpet  Device: No Device  Assistance: Independent  Gait Deviations: Slow Pily  Distance: umlimited in clinic [x] yes  [] no   Long term goal 5: Pain-free AROM knee 0-110 to WNL allowing an increase in ADL tolerance AROM LLE (degrees)  LLE General AROM: knee 0-110 [x] yes  [] no      Body structures, Functions, Activity limitations: Decreased functional mobility , Decreased endurance, Decreased posture, Increased pain, Decreased ROM, Decreased strength, Decreased balance  Assessment: Pt cont to struggle with sit to stand using equal wt shift with complaint of lat knee pain. Pt also reports R hip pain intermittently. Pt would benefit from continued PT for strengthening R knee and hip and improving wt bearing tolerance to activities. Plan to alternate land and aquatic visits. Prognosis: Good  Discharge Recommendations: Continue to assess pending progress    PLAN: [x]   Frequency/Duration:  Plan  Times per week: 2-3  Plan weeks: 6-8  Current Treatment Recommendations: Strengthening, ROM, Neuromuscular Re-education, Home Exercise Program, Manual Therapy - Soft Tissue Mobilization, Manual Therapy - Joint Manipulation, Functional Mobility Training, Transfer Training, Gait Training, Modalities, Stair training, Aquatics                   Patient Status:[x] Continue/ Initiate plan of Care    [] Discharge PT. Recommend pt continue with HEP. [x] Additional visits requested, Please re-certify for additional visits:10        Signature: Electronically signed by Govind Oliveira PT on 12/4/20 at 1:40 PM EST      If you have any questions or concerns, please don't hesitate to call. Thank you for your referral.    I have reviewed this plan of care and certify a need for medically necessary rehabilitation services.     Physician

## 2020-12-09 ENCOUNTER — HOSPITAL ENCOUNTER (OUTPATIENT)
Dept: PHYSICAL THERAPY | Age: 80
Setting detail: THERAPIES SERIES
Discharge: HOME | End: 2020-12-09
Payer: MEDICARE

## 2020-12-09 PROCEDURE — 97140 MANUAL THERAPY 1/> REGIONS: CPT

## 2020-12-09 PROCEDURE — 97110 THERAPEUTIC EXERCISES: CPT

## 2020-12-09 ASSESSMENT — PAIN SCALES - GENERAL: PAINLEVEL_OUTOF10: 7

## 2020-12-09 ASSESSMENT — PAIN DESCRIPTION - DESCRIPTORS: DESCRIPTORS: ACHING

## 2020-12-09 ASSESSMENT — PAIN DESCRIPTION - ORIENTATION: ORIENTATION: RIGHT

## 2020-12-09 ASSESSMENT — PAIN DESCRIPTION - LOCATION: LOCATION: KNEE

## 2020-12-09 ASSESSMENT — PAIN DESCRIPTION - FREQUENCY: FREQUENCY: CONTINUOUS

## 2020-12-09 NOTE — PROGRESS NOTES
[address] [address]  Outpatient Physical Therapy    Treatment Note        Date: 2020  Patient: Kasie Dixon  : 1940  ACCT #: [de-identified]  Referring Practitioner: Lorenzo Lincoln  Diagnosis: R unilateral knee replacement, pain    Visit Information:  PT Visit Information  Onset Date: 10/15/20  PT Insurance Information: [employer] Medicare  Total # of Visits Approved: (BMN)  Total # of Visits to Date: 11  Plan of Care/Certification Expiration Date: 21  No Show: 0  Canceled Appointment: 0  Progress Note Counter: 1/10 (next PN 1/10 due 21)    Subjective: Pt reports a little sore in knee and hip and trying to perform more sitting vs standing activity  Comments: RTD 21  HEP Compliance:  [x] Good [] Fair [] Poor [] Reports not doing due to:    Vital Signs  Patient Currently in Pain: Yes   Pain Screening  Patient Currently in Pain: Yes  Pain Assessment  Pain Level: 7  Pain Location: Knee  Pain Orientation: Right  Pain Descriptors: Aching  Pain Frequency: Continuous    OBJECTIVE:   Exercises  Exercise 1: WBAT, no leg press 4-6 weeks, closed chain ex preferred, 10-15 pound limit. Bike 3-4 weeks DOS 10-15, quad estim 4 weeks  Exercise 2: Nustep L4  5 min, LE's only  Exercise 3: ham curl GTB x 15  Exercise 4: LAQ 3s x 3 # x 15  Exercise 5: seated march RTB x 15  Exercise 6: Monster walks S, R, F 2 laps each. RTB arounde knees with wash cloth  Exercise 20: HEP: Sink ex and seated ex       Strength: [x] NT  [] MMT completed:        ROM: [x] NT  [] ROM measurements:      Manual:   Manual therapy  Joint mobilization: patellar mob 1 min  PROM: knee and hip 5 min  Soft Tissue Mobalization: gastroc and hamstring 8 min    Modalities:  Modalities  Cryotherapy (Minutes\Location): 10 min CP for swelling and pain control     *Indicates exercise, modality, or manual techniques to be initiated when appropriate    Assessment:       Body structures, Functions, Activity limitations: Decreased functional mobility , Decreased endurance, Decreased posture, Increased pain, Decreased ROM, Decreased strength, Decreased balance  Assessment: Pt tolerated ther ex with challenge. Strength limiting concentric and eccentric control during sit to stand transfers. Treatment Diagnosis: R knee pain with functional deficits. Prognosis: Good       Goals:  Short term goals  Time Frame for Short term goals: 4 weeks  Short term goal 1: Initiate written HEP for symptom management. Short term goal 2: Decrease R knee pain 50% to assist with improved functional gains. Short term goal 3: R knee PROM 0-100 deg to improve overall function. Long term goals  Time Frame for Long term goals : 8 weeks  Long term goal 1: Indep HEP for symptom management  Long term goal 2: Pt demo improved overall function by reporting greater than 90% per functional survey score  Long term goal 3: Improve R LE strength 4/5 to allow patient to recip stair climbing  Long term goal 4: Ambulate with least restrictive device safe/Indep community distances with min to no deviations. Long term goal 5: Pain-free AROM knee 0-110 to WNL allowing an increase in ADL tolerance  Progress toward goals:ongoing, working on ROM and strengthening to improve overall stability    POST-PAIN       Pain Rating (0-10 pain scale):  1 /10   Location and pain description same as pre-treatment unless indicated. Action: [x] NA   [] Perform HEP  [] Meds as prescribed  [] Modalities as prescribed   [] Call Physician     Frequency/Duration:  Plan  Times per week: 2-3  Plan weeks: 6-8  Current Treatment Recommendations: Strengthening, ROM, Neuromuscular Re-education, Home Exercise Program, Manual Therapy - Soft Tissue Mobilization, Manual Therapy - Joint Manipulation, Functional Mobility Training, Transfer Training, Gait Training, Modalities, Stair training, Aquatics     Pt to continue current HEP.   See objective section for any therapeutic exercise changes, additions or modifications this

## 2020-12-11 ENCOUNTER — HOSPITAL ENCOUNTER (OUTPATIENT)
Dept: PHYSICAL THERAPY | Age: 80
Setting detail: THERAPIES SERIES
Discharge: HOME | End: 2020-12-11
Payer: MEDICARE

## 2020-12-11 PROCEDURE — 97110 THERAPEUTIC EXERCISES: CPT

## 2020-12-11 PROCEDURE — 97140 MANUAL THERAPY 1/> REGIONS: CPT

## 2020-12-11 ASSESSMENT — PAIN DESCRIPTION - DESCRIPTORS: DESCRIPTORS: ACHING

## 2020-12-11 ASSESSMENT — PAIN DESCRIPTION - FREQUENCY: FREQUENCY: CONTINUOUS

## 2020-12-11 ASSESSMENT — PAIN DESCRIPTION - LOCATION: LOCATION: KNEE

## 2020-12-11 ASSESSMENT — PAIN SCALES - GENERAL: PAINLEVEL_OUTOF10: 7

## 2020-12-11 ASSESSMENT — PAIN DESCRIPTION - ORIENTATION: ORIENTATION: RIGHT

## 2020-12-11 NOTE — PROGRESS NOTES
ROM, Decreased strength, Decreased balance  Assessment: Pt tolerated therapy well with decreased pain post manual. Ther ex challenging due to fatigue and decreased strength. plan to initiate one day aquatic therapy starting next week to assist with pain control. Treatment Diagnosis: R knee pain with functional deficits. Prognosis: Good       Goals:  Short term goals  Time Frame for Short term goals: 4 weeks  Short term goal 1: Initiate written HEP for symptom management. Short term goal 2: Decrease R knee pain 50% to assist with improved functional gains. Short term goal 3: R knee PROM 0-100 deg to improve overall function. Long term goals  Time Frame for Long term goals : 8 weeks  Long term goal 1: Indep HEP for symptom management  Long term goal 2: Pt demo improved overall function by reporting greater than 90% per functional survey score  Long term goal 3: Improve R LE strength 4/5 to allow patient to recip stair climbing  Long term goal 4: Ambulate with least restrictive device safe/Indep community distances with min to no deviations. Long term goal 5: Pain-free AROM knee 0-110 to WNL allowing an increase in ADL tolerance  Progress toward goals:ongoing, working on ROM and strengthening to improve overall stability      POST-PAIN       Pain Rating (0-10 pain scale):  0 /10   Location and pain description same as pre-treatment unless indicated. Action: [x] NA   [] Perform HEP  [] Meds as prescribed  [] Modalities as prescribed   [] Call Physician     Frequency/Duration:  Plan  Times per week: 2-3  Plan weeks: 6-8  Current Treatment Recommendations: Strengthening, ROM, Neuromuscular Re-education, Home Exercise Program, Manual Therapy - Soft Tissue Mobilization, Manual Therapy - Joint Manipulation, Functional Mobility Training, Transfer Training, Gait Training, Modalities, Stair training, Aquatics     Pt to continue current HEP.   See objective section for any therapeutic exercise changes, additions or modifications this date.     PT Individual Minutes  Time In: 1000  Time Out: 1052  Minutes: 52  Timed Code Treatment Minutes: 40 Minutes  Procedure Minutes:10 CP     Timed Activity Minutes Units   Ther Ex 26 2   Manual  14 1       Signature:  Electronically signed by Yared Coronado PT on 12/11/20 at 2:55 PM EST

## 2020-12-14 ENCOUNTER — HOSPITAL ENCOUNTER (OUTPATIENT)
Dept: PHYSICAL THERAPY | Age: 80
Setting detail: THERAPIES SERIES
Discharge: HOME | End: 2020-12-14
Payer: MEDICARE

## 2020-12-14 PROCEDURE — 97113 AQUATIC THERAPY/EXERCISES: CPT

## 2020-12-14 ASSESSMENT — PAIN DESCRIPTION - FREQUENCY: FREQUENCY: CONTINUOUS

## 2020-12-14 ASSESSMENT — PAIN SCALES - GENERAL: PAINLEVEL_OUTOF10: 7

## 2020-12-14 ASSESSMENT — PAIN DESCRIPTION - LOCATION: LOCATION: KNEE

## 2020-12-14 ASSESSMENT — PAIN DESCRIPTION - ORIENTATION: ORIENTATION: RIGHT

## 2020-12-14 ASSESSMENT — PAIN DESCRIPTION - DESCRIPTORS: DESCRIPTORS: ACHING

## 2020-12-14 NOTE — PROGRESS NOTES
goals  Time Frame for Short term goals: 4 weeks  Short term goal 1: Initiate written HEP for symptom management. Short term goal 2: Decrease R knee pain 50% to assist with improved functional gains. Short term goal 3: R knee PROM 0-100 deg to improve overall function. Long term goals  Time Frame for Long term goals : 8 weeks  Long term goal 1: Indep HEP for symptom management  Long term goal 2: Pt demo improved overall function by reporting greater than 90% per functional survey score  Long term goal 3: Improve R LE strength 4/5 to allow patient to recip stair climbing  Long term goal 4: Ambulate with least restrictive device safe/Indep community distances with min to no deviations. Long term goal 5: Pain-free AROM knee 0-110 to WNL allowing an increase in ADL tolerance  Progress toward goals:ongoing, working on ROM and strengthening to improve overall stability      POST-PAIN       Pain Rating (0-10 pain scale):   5/10   Location and pain description same as pre-treatment unless indicated. Action: [x] NA   [] Perform HEP  [] Meds as prescribed  [] Modalities as prescribed   [] Call Physician     Frequency/Duration:  Plan  Times per week: 2-3  Plan weeks: 6-8  Current Treatment Recommendations: Strengthening, ROM, Neuromuscular Re-education, Home Exercise Program, Manual Therapy - Soft Tissue Mobilization, Manual Therapy - Joint Manipulation, Functional Mobility Training, Transfer Training, Gait Training, Modalities, Stair training, Aquatics     Pt to continue current HEP. See objective section for any therapeutic exercise changes, additions or modifications this date.     PT Individual Minutes  Time In: 1003  Time Out: 3929  Minutes: 38  Timed Code Treatment Minutes: 38 Minutes  Procedure Minutes:NA     Timed Activity Minutes Units   Ther Ex 38 3       Signature:  Electronically signed by Laya Bolaños PT on 12/14/20 at 11:05 AM EST

## 2020-12-16 ENCOUNTER — HOSPITAL ENCOUNTER (OUTPATIENT)
Dept: PHYSICAL THERAPY | Age: 80
Setting detail: THERAPIES SERIES
Discharge: HOME | End: 2020-12-16
Payer: MEDICARE

## 2020-12-16 PROCEDURE — 97110 THERAPEUTIC EXERCISES: CPT

## 2020-12-16 PROCEDURE — 97140 MANUAL THERAPY 1/> REGIONS: CPT

## 2020-12-16 ASSESSMENT — PAIN DESCRIPTION - ORIENTATION: ORIENTATION: RIGHT

## 2020-12-16 ASSESSMENT — PAIN DESCRIPTION - DESCRIPTORS: DESCRIPTORS: ACHING

## 2020-12-16 ASSESSMENT — PAIN DESCRIPTION - FREQUENCY: FREQUENCY: CONTINUOUS

## 2020-12-16 ASSESSMENT — PAIN DESCRIPTION - LOCATION: LOCATION: HIP;KNEE

## 2020-12-16 ASSESSMENT — PAIN SCALES - GENERAL: PAINLEVEL_OUTOF10: 7

## 2020-12-16 NOTE — PROGRESS NOTES
[address] [address]  Outpatient Physical Therapy    Treatment Note        Date: 2020  Patient: Kaitlyn Howell  : 1940  ACCT #: [de-identified]  Referring Practitioner: Patrizia Naranjo  Diagnosis: R unilateral knee replacement, pain    Visit Information:  PT Visit Information  Onset Date: 10/15/20  PT Insurance Information: [employer] Medicare  Total # of Visits Approved: (BMN)  Total # of Visits to Date: 15  Plan of Care/Certification Expiration Date: 21  No Show: 0  Canceled Appointment: 0  Progress Note Counter: 4/10 (next PN 1/10 due 21)    Subjective: Pt reports having a incident in the car with a twisting motion and havng significantly increased hip/groin to knee pain. Pt has been icing with little relief. Pt advised to call MD office if symptoms worsen or dont decrease and any difficulty walking. Comments: RTD 21  HEP Compliance:  [x] Good [] Fair [] Poor [] Reports not doing due to:    Vital Signs  Patient Currently in Pain: Yes   Pain Screening  Patient Currently in Pain: Yes  Pain Assessment  Pain Level: 7  Pain Location: Hip;Knee  Pain Orientation: Right  Pain Descriptors: Aching  Pain Frequency: Continuous    OBJECTIVE:   Exercises  Exercise 1: WBAT, no leg press 4-6 weeks, closed chain ex preferred, 10-15 pound limit. Bike 3-4 weeks DOS 10-15, quad estim 4 weeks  Exercise 2: Nustep L4  5 min, LE's only  Exercise 3: hip add with ball 5 s x 10  Exercise 4: LAQ no wt x 10  Exercise 5: seated march  x 15  Exercise 6: hip abd without resistance.  x 15  Exercise 20: HEP: Sink ex and seated ex     Strength: [x] NT  [] MMT completed:     ROM: [x] NT  [] ROM measurements:      Manual:   Manual therapy  PROM: knee and hip 5 min  Soft Tissue Mobalization: gastroc and hamstring 8 min    Modalities:  Modalities  Cryotherapy (Minutes\Location): 10 min Knee and hip R CP for swelling and pain control     *Indicates exercise, modality, or manual techniques to be initiated when appropriate    Assessment: Body structures, Functions, Activity limitations: Decreased functional mobility , Decreased endurance, Decreased posture, Increased pain, Decreased ROM, Decreased strength, Decreased balance  Assessment: Pt tolerated revised program to tolerate R hip pain this date. Pt with decreased pain post CP to Hip and knee and advised to call MD if symptoms worsen. Treatment Diagnosis: R knee pain with functional deficits. Prognosis: Good       Goals:  Short term goals  Time Frame for Short term goals: 4 weeks  Short term goal 1: Initiate written HEP for symptom management. Short term goal 2: Decrease R knee pain 50% to assist with improved functional gains. Short term goal 3: R knee PROM 0-100 deg to improve overall function. Long term goals  Time Frame for Long term goals : 8 weeks  Long term goal 1: Indep HEP for symptom management  Long term goal 2: Pt demo improved overall function by reporting greater than 90% per functional survey score  Long term goal 3: Improve R LE strength 4/5 to allow patient to recip stair climbing  Long term goal 4: Ambulate with least restrictive device safe/Indep community distances with min to no deviations. Long term goal 5: Pain-free AROM knee 0-110 to WNL allowing an increase in ADL tolerance  Progress toward goals:ongoing, working on ROM and strengthening to improve overall stability      POST-PAIN       Pain Rating (0-10 pain scale):  3 /10   Location and pain description same as pre-treatment unless indicated.    Action: [x] NA   [] Perform HEP  [] Meds as prescribed  [] Modalities as prescribed   [] Call Physician     Frequency/Duration:  Plan  Times per week: 2-3  Plan weeks: 6-8  Current Treatment Recommendations: Strengthening, ROM, Neuromuscular Re-education, Home Exercise Program, Manual Therapy - Soft Tissue Mobilization, Manual Therapy - Joint Manipulation, Functional Mobility Training, Transfer Training, Gait Training, Modalities, Stair training, Aquatics     Pt to continue current HEP. See objective section for any therapeutic exercise changes, additions or modifications this date.     PT Individual Minutes  Time In: 1000  Time Out: 1052  Minutes: 52  Timed Code Treatment Minutes: 42 Minutes  Procedure Minutes:10 min CP     Timed Activity Minutes Units   Ther Ex 29 2   Manual  13 1       Signature:  Electronically signed by Agustin Severance, PT on 12/16/20 at 3:52 PM EST

## 2020-12-21 ENCOUNTER — HOSPITAL ENCOUNTER (OUTPATIENT)
Dept: PHYSICAL THERAPY | Age: 80
Setting detail: THERAPIES SERIES
Discharge: HOME | End: 2020-12-21
Payer: MEDICARE

## 2020-12-21 PROCEDURE — 97140 MANUAL THERAPY 1/> REGIONS: CPT

## 2020-12-21 PROCEDURE — 97110 THERAPEUTIC EXERCISES: CPT

## 2020-12-21 ASSESSMENT — PAIN DESCRIPTION - DESCRIPTORS: DESCRIPTORS: ACHING

## 2020-12-21 ASSESSMENT — PAIN SCALES - GENERAL: PAINLEVEL_OUTOF10: 3

## 2020-12-21 ASSESSMENT — PAIN DESCRIPTION - ORIENTATION: ORIENTATION: RIGHT

## 2020-12-21 ASSESSMENT — PAIN DESCRIPTION - FREQUENCY: FREQUENCY: CONTINUOUS

## 2020-12-21 ASSESSMENT — PAIN DESCRIPTION - LOCATION: LOCATION: KNEE

## 2020-12-21 NOTE — PROGRESS NOTES
*Indicates exercise, modality, or manual techniques to be initiated when appropriate    Assessment: Body structures, Functions, Activity limitations: Decreased functional mobility , Decreased endurance, Decreased posture, Increased pain, Decreased ROM, Decreased strength, Decreased balance  Assessment: Pt tolerated ther ex well and decreased pain with STM. Pt cont to express pain with R hip mvmt. Pt encouraged to follow up with MD regarding consistent R hip pain. Treatment Diagnosis: R knee pain with functional deficits. Prognosis: Good       Goals:  Short term goals  Time Frame for Short term goals: 4 weeks  Short term goal 1: Initiate written HEP for symptom management. Short term goal 2: Decrease R knee pain 50% to assist with improved functional gains. Short term goal 3: R knee PROM 0-100 deg to improve overall function. Long term goals  Time Frame for Long term goals : 8 weeks  Long term goal 1: Indep HEP for symptom management  Long term goal 2: Pt demo improved overall function by reporting greater than 90% per functional survey score  Long term goal 3: Improve R LE strength 4/5 to allow patient to recip stair climbing  Long term goal 4: Ambulate with least restrictive device safe/Indep community distances with min to no deviations. Long term goal 5: Pain-free AROM knee 0-110 to WNL allowing an increase in ADL tolerance  Progress toward goals:ongoing, working on ROM and strengthening to improve overall stability      POST-PAIN       Pain Rating (0-10 pain scale):   3/10 knee, no increase with hip pain  Location and pain description same as pre-treatment unless indicated.    Action: [x] NA   [] Perform HEP  [] Meds as prescribed  [] Modalities as prescribed   [] Call Physician     Frequency/Duration:  Plan  Times per week: 2-3  Plan weeks: 6-8  Current Treatment Recommendations: Strengthening, ROM, Neuromuscular Re-education, Home Exercise Program, Manual Therapy - Soft Tissue Mobilization, Manual Therapy - Joint Manipulation, Functional Mobility Training, Transfer Training, Gait Training, Modalities, Stair training, Aquatics     Pt to continue current HEP. See objective section for any therapeutic exercise changes, additions or modifications this date.      PT Individual Minutes  Time In: 1511  Time Out: 1052  Minutes: 50  Timed Code Treatment Minutes: 40 Minutes  Procedure Minutes:CP 10 min     Timed Activity Minutes Units   Ther Ex 28 2   Manual  12 1       Signature:  Electronically signed by Madhuri Verma PT on 12/21/20 at 10:35 AM EST

## 2020-12-22 ENCOUNTER — HOSPITAL ENCOUNTER (OUTPATIENT)
Dept: PHYSICAL THERAPY | Age: 80
Setting detail: THERAPIES SERIES
Discharge: HOME | End: 2020-12-22
Payer: MEDICARE

## 2020-12-22 PROCEDURE — 97140 MANUAL THERAPY 1/> REGIONS: CPT

## 2020-12-22 PROCEDURE — 97110 THERAPEUTIC EXERCISES: CPT

## 2020-12-22 ASSESSMENT — PAIN DESCRIPTION - DESCRIPTORS: DESCRIPTORS: ACHING

## 2020-12-22 ASSESSMENT — PAIN SCALES - GENERAL: PAINLEVEL_OUTOF10: 3

## 2020-12-22 ASSESSMENT — PAIN DESCRIPTION - LOCATION: LOCATION: KNEE

## 2020-12-22 ASSESSMENT — PAIN DESCRIPTION - ORIENTATION: ORIENTATION: RIGHT

## 2020-12-22 NOTE — PROGRESS NOTES
[id number] [address]  Outpatient Physical Therapy    Treatment Note        Date: 2020  Patient: Reggie Rubi  : 1940  ACCT #: [de-identified]  Referring Practitioner: Marja Kanner  Diagnosis: R unilateral knee replacement, pain    Visit Information:  PT Visit Information  Onset Date: 10/15/20  PT Insurance Information: [employer] Medicare  Total # of Visits Approved: (BMN)  Total # of Visits to Date: 12  Plan of Care/Certification Expiration Date: 21  No Show: 0  Canceled Appointment: 0  Progress Note Counter: 6/10 (next PN 1/10 due 21)    Subjective: Pt reports using the heating pad on R hip and feels a little better. Comments: RTD 21  HEP Compliance:  [x] Good [] Fair [] Poor [] Reports not doing due to:    Vital Signs  Patient Currently in Pain: Yes   Pain Screening  Patient Currently in Pain: Yes  Pain Assessment  Pain Level: 3(5/10 R hip, 6/10 R knee with movement.)  Pain Location: Knee  Pain Orientation: Right  Pain Descriptors: Aching    OBJECTIVE:   Exercises  Exercise 1: WBAT, no leg press 4-6 weeks, closed chain ex preferred, 10-15 pound limit. Bike 3-4 weeks DOS 10-15, quad estim 4 weeks  Exercise 2: Nustep L4  5 min, LE's only  Exercise 3: hip add with ball 5 s x 20  Exercise 4: LAQ no wt x  3s x 15  Exercise 6: SAQ x 15, 3sec hold  Exercise 7: standing 3 way SLR R x 10 1.5#  Exercise 8: standing march 1.5# x 15 R LE only. Exercise 9: standing hip [place] x 10, 1.5# on R LE CW, CCW  Exercise 20: HEP: Sink ex and seated ex       Strength: [x] NT  [] MMT completed:   ROM: [x] NT  [] ROM measurements:         Manual:   Manual therapy  PROM: knee and hip 5 min  Soft Tissue Mobalization: hip flex and abductors 7 min    Modalities:  Modalities  Cryotherapy (Minutes\Location): 10 min Knee and hip R CP for swelling and pain control     *Indicates exercise, modality, or manual techniques to be initiated when appropriate    Assessment:       Body structures, Functions, Activity limitations: Decreased functional mobility , Decreased endurance, Decreased posture, Increased pain, Decreased ROM, Decreased strength, Decreased balance  Assessment: Pt tolerated therapy without increase pain. Treatment modified to accomodate hip pain. Improved tolerance since heating hip, however continues to reports pain with wt bearing and movement, consistently. Treatment Diagnosis: R knee pain with functional deficits. Prognosis: Good     Goals:  Short term goals  Time Frame for Short term goals: 4 weeks  Short term goal 1: Initiate written HEP for symptom management. Short term goal 2: Decrease R knee pain 50% to assist with improved functional gains. Short term goal 3: R knee PROM 0-100 deg to improve overall function. Long term goals  Time Frame for Long term goals : 8 weeks  Long term goal 1: Indep HEP for symptom management  Long term goal 2: Pt demo improved overall function by reporting greater than 90% per functional survey score  Long term goal 3: Improve R LE strength 4/5 to allow patient to recip stair climbing  Long term goal 4: Ambulate with least restrictive device safe/Indep community distances with min to no deviations. Long term goal 5: Pain-free AROM knee 0-110 to WNL allowing an increase in ADL tolerance  Progress toward goals:ongoing, working on ROM and strengthening to improve overall stability      POST-PAIN       Pain Rating (0-10 pain scale):  3 /10   Location and pain description same as pre-treatment unless indicated.    Action: [x] NA   [] Perform HEP  [] Meds as prescribed  [] Modalities as prescribed   [] Call Physician     Frequency/Duration:  Plan  Times per week: 2-3  Plan weeks: 6-8  Current Treatment Recommendations: Strengthening, ROM, Neuromuscular Re-education, Home Exercise Program, Manual Therapy - Soft Tissue Mobilization, Manual Therapy - Joint Manipulation, Functional Mobility Training, Transfer Training, Gait Training, Modalities, Stair training, Aquatics Pt to continue current HEP. See objective section for any therapeutic exercise changes, additions or modifications this date.          PT Individual Minutes  Time In: 1006  Time Out: 1101  Minutes: 55  Timed Code Treatment Minutes: 42 Minutes  Procedure Minutes:10 min CP   Timed Activity Minutes Units   Ther Ex 30 2   Manual  12 1       Signature:  Electronically signed by Christine Dunn PT on 12/22/20 at 11:08 AM EST

## 2020-12-28 ENCOUNTER — HOSPITAL ENCOUNTER (OUTPATIENT)
Dept: PHYSICAL THERAPY | Age: 80
Setting detail: THERAPIES SERIES
Discharge: HOME | End: 2020-12-28
Payer: MEDICARE

## 2020-12-28 PROCEDURE — 97113 AQUATIC THERAPY/EXERCISES: CPT

## 2020-12-28 ASSESSMENT — PAIN DESCRIPTION - LOCATION: LOCATION: KNEE

## 2020-12-28 ASSESSMENT — PAIN DESCRIPTION - DESCRIPTORS: DESCRIPTORS: ACHING

## 2020-12-28 ASSESSMENT — PAIN SCALES - GENERAL: PAINLEVEL_OUTOF10: 6

## 2020-12-28 ASSESSMENT — PAIN DESCRIPTION - ORIENTATION: ORIENTATION: RIGHT

## 2020-12-28 ASSESSMENT — PAIN DESCRIPTION - FREQUENCY: FREQUENCY: CONTINUOUS

## 2020-12-28 NOTE — PROGRESS NOTES
Goals:  Short term goals  Time Frame for Short term goals: 4 weeks  Short term goal 1: Initiate written HEP for symptom management. Short term goal 2: Decrease R knee pain 50% to assist with improved functional gains. Short term goal 3: R knee PROM 0-100 deg to improve overall function. Long term goals  Time Frame for Long term goals : 8 weeks  Long term goal 1: Indep HEP for symptom management  Long term goal 2: Pt demo improved overall function by reporting greater than 90% per functional survey score  Long term goal 3: Improve R LE strength 4/5 to allow patient to recip stair climbing  Long term goal 4: Ambulate with least restrictive device safe/Indep community distances with min to no deviations. Long term goal 5: Pain-free AROM knee 0-110 to WNL allowing an increase in ADL tolerance  Progress toward goals:ongoing, working on ROM and strengthening to improve overall stability      POST-PAIN       Pain Rating (0-10 pain scale):  3 /10   Location and pain description same as pre-treatment unless indicated. Action: [x] NA   [] Perform HEP  [] Meds as prescribed  [] Modalities as prescribed   [] Call Physician     Frequency/Duration:  Plan  Times per week: 2-3  Plan weeks: 6-8  Current Treatment Recommendations: Strengthening, ROM, Neuromuscular Re-education, Home Exercise Program, Manual Therapy - Soft Tissue Mobilization, Manual Therapy - Joint Manipulation, Functional Mobility Training, Transfer Training, Gait Training, Modalities, Stair training, Aquatics     Pt to continue current HEP. See objective section for any therapeutic exercise changes, additions or modifications this date.          PT Individual Minutes  Time In: 1003  Time Out: 5631  Minutes: 41  Timed Code Treatment Minutes: 41 Minutes  Procedure Minutes:NA     Timed Activity Minutes Units   Aq ther Ex 41 3       Signature:  Electronically signed by Christine Dunn PT on 12/28/20 at 10:58 AM EST

## 2020-12-29 ENCOUNTER — HOSPITAL ENCOUNTER (OUTPATIENT)
Dept: PHYSICAL THERAPY | Age: 80
Setting detail: THERAPIES SERIES
Discharge: HOME | End: 2020-12-29
Payer: MEDICARE

## 2020-12-29 PROCEDURE — 97140 MANUAL THERAPY 1/> REGIONS: CPT

## 2020-12-29 PROCEDURE — 97110 THERAPEUTIC EXERCISES: CPT

## 2020-12-29 ASSESSMENT — PAIN DESCRIPTION - ORIENTATION: ORIENTATION: RIGHT

## 2020-12-29 ASSESSMENT — PAIN DESCRIPTION - FREQUENCY: FREQUENCY: CONTINUOUS

## 2020-12-29 ASSESSMENT — PAIN DESCRIPTION - DESCRIPTORS: DESCRIPTORS: ACHING

## 2020-12-29 ASSESSMENT — PAIN DESCRIPTION - LOCATION: LOCATION: KNEE

## 2020-12-29 ASSESSMENT — PAIN SCALES - GENERAL: PAINLEVEL_OUTOF10: 6

## 2020-12-29 NOTE — PROGRESS NOTES
[address], [address]     Ph: [phone]  Fax: [phone]    [] Certification  [x] Recertification []  Plan of Care  [x] Progress Note [] Discharge      To:  Estephania Madrid      From:  Louie Whitfield, PT  Patient: Aarti Porras     : 1940  Diagnosis: R unilateral knee replacement, pain     Date: 2020  Treatment Diagnosis: R knee pain with functional deficits. Plan of Care/Certification Expiration Date: 21  Progress Report Period from:  2020  to 2020    Total # of Visits to Date: 18   No Show: 0    Canceled Appointment: 0     OBJECTIVE:   Short Term Goals - Time Frame for Short term goals: 4 weeks    Goals Current/Discharge status  Met   Short term goal 1: Initiate written HEP for symptom management. Written HEP provided  for symptom management   [x] yes  [] no   Short term goal 2: Decrease R knee pain 50% to assist with improved functional gains. Pain Location: Knee    Pain Level: 6    Pain Descriptors: Aching [x] partial  [] no   Short term goal 3: R knee PROM 0-100 deg to improve overall function. PROM LLE (degrees)  LLE General PROM: 115 flex  AROM LLE (degrees)  LLE General AROM: knee 0-110    [x] yes  [] no     Long Term Goals - Time Frame for Long term goals : 8 weeks  Goals Current/ Discharge status Met   Long term goal 1: Indep HEP for symptom management Written HEP provided  for symptom management   [x] yes  [] no   Long term goal 2: Pt demo improved overall function by reporting greater than 90% per functional survey score Exam: LEFS 26/80= 33% functional   [] yes  [x] no   Long term goal 3: Improve R LE strength 4/5 to allow patient to recip stair climbing Strength RLE  Comment: Hip 3+/5, knee 4/5 Quad, Ham 3+/5 to 4-/5    [] yes  [x] no   Long term goal 4: Ambulate with least restrictive device safe/Indep community distances with min to no deviations.  Ambulation 1  Surface: carpet  Device: No Device  Assistance: Independent  Gait Deviations: None  Distance: unlimited in clinic    Stairs  # Steps : 8  Stairs Height: 6\"  Rails: Bilateral  Device: No Device  Assistance: Modified independent   Comment: recip up and non recip down. [x] yes  [] no   Long term goal 5: Pain-free AROM knee 0-110 to WNL allowing an increase in ADL tolerance  PROM LLE (degrees)  LLE General PROM: 115 flex  AROM LLE (degrees)  LLE General AROM: knee 0-110    [x] yes  [] no        Body structures, Functions, Activity limitations: Decreased functional mobility , Decreased endurance, Decreased posture, Increased pain, Decreased ROM, Decreased strength, Decreased balance  Assessment: Pt progressing toward goals however pain variable and increases between B knees (R TKR recent and R hip (recent replacement). Initiated aquatics to allow Indep transition to HEP. Pt would like to hold therapy 1-2 weeks and assess Indep mgmt. Pt will call for follow up if needed for guidance and exercise progression. Prognosis: Good  Discharge Recommendations: Continue to assess pending progress    PLAN: [x]   Frequency/Duration:  Plan  Times per week: 2  Plan weeks: 4  Current Treatment Recommendations: Strengthening, ROM, Neuromuscular Re-education, Home Exercise Program, Manual Therapy - Soft Tissue Mobilization, Manual Therapy - Joint Manipulation, Functional Mobility Training, Transfer Training, Gait Training, Modalities, Stair training, Aquatics  Plan Comment: Hold 1-2 weeks for self monitor and follow up visits if needed to address residual symptoms and HEP progression if needed. Precautions:                            Patient Status:[x] Continue/ Initiate plan of Care    [] Discharge PT. Recommend pt continue with HEP.      [x] Additional visits requested, Please re-certify for additional visits:4-6 visits          Signature: Electronically signed by Camelia Pugh PT on 12/29/20 at 11:06 AM EST      If you have any questions or concerns, please don't hesitate to call. Thank you for your referral.    I have reviewed this plan of care and certify a need for medically necessary rehabilitation services.     Physician Signature:__________________________________________________________  Date:  Please sign and return

## 2020-12-29 NOTE — PROGRESS NOTES
[address] [address]  Outpatient Physical Therapy    Treatment Note        Date: 2020  Patient: Aarti Porras  : 1940  ACCT #: [de-identified]  Referring Practitioner: Estephania Madrid  Diagnosis: R unilateral knee replacement, pain    Visit Information:  PT Visit Information  Onset Date: 10/15/20  PT Insurance Information: [employer] Medicare  Total # of Visits Approved: (BMN)  Total # of Visits to Date: 18  Plan of Care/Certification Expiration Date: 21  No Show: 0  Progress Note Due Date: 21  Canceled Appointment: 0  Progress Note Counter:  (PN due 21)    Subjective: Pt reports L knee not hurting today however R knee continues to have higher level pain. No pain in hip. Comments: RTD 21  HEP Compliance:  [x] Good [] Fair [] Poor [] Reports not doing due to:    Vital Signs  Patient Currently in Pain: Yes   Pain Screening  Patient Currently in Pain: Yes  Pain Assessment  Pain Level: 6  Pain Location: Knee  Pain Orientation: Right  Pain Descriptors: Aching  Pain Frequency: Continuous    OBJECTIVE:   Exercises  Exercise 1: WBAT, no leg press 4-6 weeks, closed chain ex preferred, 10-15 pound limit. Bike 3-4 weeks DOS 10-15, quad estim 4 weeks  Exercise 2: Nustep L4  5 min, LE's only  Exercise 3: hip add with ball 5 s x 20  Exercise 4: LAQ 1.5#  wt x  3s x 10  Exercise 7: standing 3 way SLR B 2 x 6,  1.5#  Exercise 8: standing march 1.5# x 15 R LE only. Exercise 9: standing hip [place] x 10, 1.5# on R LE CW, CCW  Exercise 20: HEP: Sink ex and seated ex     Ambulation 1  Surface: carpet  Device: No Device  Assistance: Independent  Gait Deviations: None  Distance: umlimited in clinic         Stairs  # Steps : 8  Stairs Height: 6\"  Rails: Bilateral  Device: No Device  Assistance: Modified independent   Comment: recip up and non recip down.        Strength: [] NT  [x] MMT completed:  Strength RLE  Comment: Hip 3+/5, knee 4/5 Quad, Ham 3+/5 to 4-/5   ROM: [] NT  [x] ROM measurements: PROM LLE (degrees)  LLE General PROM: 115 flex  AROM LLE (degrees)  LLE General AROM: knee 0-110        *Indicates exercise, modality, or manual techniques to be initiated when appropriate    Assessment: Body structures, Functions, Activity limitations: Decreased functional mobility , Decreased endurance, Decreased posture, Increased pain, Decreased ROM, Decreased strength, Decreased balance  Assessment: Pt progressing toward goals however pain variable and increases between B knees (R TKR recent and R hip (recent replacement). Initiated aquatics to allow Indep transition to HEP. Pt would like to hold therapy 1-2 weeks and assess Indep mgmt. Pt will call for follow up if needed for guidance and exercise progression. Treatment Diagnosis: R knee pain with functional deficits. Prognosis: Good       Goals:  Short term goals  Time Frame for Short term goals: 4 weeks  Short term goal 1: Initiate written HEP for symptom management. Short term goal 2: Decrease R knee pain 50% to assist with improved functional gains. Short term goal 3: R knee PROM 0-100 deg to improve overall function. Long term goals  Time Frame for Long term goals : 8 weeks  Long term goal 1: Indep HEP for symptom management  Long term goal 2: Pt demo improved overall function by reporting greater than 90% per functional survey score  Long term goal 3: Improve R LE strength 4/5 to allow patient to recip stair climbing  Long term goal 4: Ambulate with least restrictive device safe/Indep community distances with min to no deviations. Long term goal 5: Pain-free AROM knee 0-110 to WNL allowing an increase in ADL tolerance  Progress toward goals:See PN    POST-PAIN       Pain Rating (0-10 pain scale):  3-4 /10   Location and pain description same as pre-treatment unless indicated.    Action: [x] NA   [] Perform HEP  [] Meds as prescribed  [] Modalities as prescribed   [] Call Physician     Frequency/Duration:  Plan  Times per week: 2  Plan weeks: 4  Current Treatment Recommendations: Strengthening, ROM, Neuromuscular Re-education, Home Exercise Program, Manual Therapy - Soft Tissue Mobilization, Manual Therapy - Joint Manipulation, Functional Mobility Training, Transfer Training, Gait Training, Modalities, Stair training, Aquatics  Plan Comment: Hold 1-2 weeks for self monitor and follow up visits if needed to address residual symptoms and HEP progression if needed. Pt to continue current HEP. See objective section for any therapeutic exercise changes, additions or modifications this date.     PT Individual Minutes  Time In: 7972  Time Out: 1120  Minutes: 45  Timed Code Treatment Minutes: 41 Minutes  Procedure Minutes:NA     Timed Activity Minutes Units   Ther Ex 35 2   Manual  6 1       Signature:  Electronically signed by Christopher Mcghee PT on 12/29/20 at 11:33 AM EST

## 2021-01-11 ENCOUNTER — HOSPITAL ENCOUNTER (OUTPATIENT)
Dept: PHYSICAL THERAPY | Age: 81
Setting detail: THERAPIES SERIES
Discharge: HOME | End: 2021-01-11
Payer: MEDICARE

## 2021-01-11 PROCEDURE — 97110 THERAPEUTIC EXERCISES: CPT

## 2021-01-11 PROCEDURE — 97140 MANUAL THERAPY 1/> REGIONS: CPT

## 2021-01-11 ASSESSMENT — PAIN DESCRIPTION - LOCATION: LOCATION: KNEE

## 2021-01-11 NOTE — PROGRESS NOTES
[address] [address]  Outpatient Physical Therapy    Treatment Note        Date: 2021  Patient: Eric Young  : 1940  ACCT #: [de-identified]  Referring Practitioner: Jordan Gerardo  Diagnosis: R unilateral knee replacement, pain    Visit Information:  PT Visit Information  Onset Date: 10/15/20  PT Insurance Information: [employer]na Medicare  Total # of Visits Approved: (BMN)  Total # of Visits to Date: 23  Plan of Care/Certification Expiration Date: 21  No Show: 0  Canceled Appointment: 0  Progress Note Counter:   (PN due 21)    Subjective: Pt reports doing HEP and went to the pool for an hour with increase pain next day. Pt reports stair climbing causing increase knee pain. Comments: RTD 21  HEP Compliance:  [x] Good [] Fair [] Poor [] Reports not doing due to:    Vital Signs  Patient Currently in Pain: Yes   Pain Screening  Patient Currently in Pain: Yes  Pain Assessment  Pain Assessment: 0-10  Pain Location: Knee  Pain Orientation: Right  Pain Descriptors: Aching    OBJECTIVE:   Exercises  Exercise 1: WBAT, no leg press 4-6 weeks, closed chain ex preferred, 10-15 pound limit. Bike 3-4 weeks DOS 10-15, quad estim 4 weeks  Exercise 3: hip add with ball 5 s x 20  Exercise 4: LAQ 3#  wt x  3s x 10  Exercise 8: standing walking march  x 3 min without UE support  Exercise 9: standing hip [place] x 10, 1.5# on R LE CW, CCW  Exercise 10: Total gym level squat and heel raise L10 x 20  Exercise 11: hip IR ER R seated tband 10 deg from neutral RTB x 10  Exercise 20: HEP: Sink ex and seated ex    Ambulation 1  Surface: carpet  Device: No Device  Assistance: Independent  Gait Deviations: None  Distance: umlimited in clinic     Stairs  # Steps : 12  Stairs Height: 6\"  Rails: Right ascending  Device: No Device  Assistance: Modified independent   Comment: Recip up and recip down with compensation noted during descent. VC's to correct foot placement.      Strength: [x] NT  [] MMT completed: Meds as prescribed  [] Modalities as prescribed   [] Call Physician     Frequency/Duration:  Plan  Times per week: 2  Plan weeks: 4  Current Treatment Recommendations: Strengthening, ROM, Neuromuscular Re-education, Home Exercise Program, Manual Therapy - Soft Tissue Mobilization, Manual Therapy - Joint Manipulation, Functional Mobility Training, Transfer Training, Gait Training, Modalities, Stair training, Aquatics  Plan Comment:  follow up 1x/wk for HEP progression and assessment. Pt to continue current HEP. See objective section for any therapeutic exercise changes, additions or modifications this date.          PT Individual Minutes  Time In: 1120  Time Out: 7420  Minutes: 49  Timed Code Treatment Minutes: 45 Minutes  Procedure Minutes:NA     Timed Activity Minutes Units   Ther Ex 31 2   Manual  14 1       Signature:  Electronically signed by [employer], PT on 1/11/21 at 3:30 PM EST

## 2021-01-15 ENCOUNTER — APPOINTMENT (OUTPATIENT)
Dept: PHYSICAL THERAPY | Age: 81
End: 2021-01-15
Payer: MEDICARE

## 2021-01-18 ENCOUNTER — APPOINTMENT (OUTPATIENT)
Dept: PHYSICAL THERAPY | Age: 81
End: 2021-01-18
Payer: MEDICARE

## 2021-01-20 ENCOUNTER — HOSPITAL ENCOUNTER (OUTPATIENT)
Dept: PHYSICAL THERAPY | Age: 81
Setting detail: THERAPIES SERIES
Discharge: HOME | End: 2021-01-20
Payer: MEDICARE

## 2021-01-20 PROCEDURE — 97140 MANUAL THERAPY 1/> REGIONS: CPT

## 2021-01-20 PROCEDURE — 97110 THERAPEUTIC EXERCISES: CPT

## 2021-01-20 ASSESSMENT — PAIN DESCRIPTION - DESCRIPTORS: DESCRIPTORS: ACHING

## 2021-01-20 ASSESSMENT — PAIN DESCRIPTION - ORIENTATION: ORIENTATION: RIGHT

## 2021-01-20 NOTE — PROGRESS NOTES
[address] [address]  Outpatient Physical Therapy    Treatment Note        Date: 2021  Patient: Richelle Smith  : 1940  ACCT #: [de-identified]  Referring Practitioner: Jonathan Morgan  Diagnosis: R unilateral knee replacement, pain    Visit Information:  PT Visit Information  Onset Date: 10/15/20  PT Insurance Information: A[employer]na Medicare  Total # of Visits Approved: (BMN)  Total # of Visits to Date: 20  Plan of Care/Certification Expiration Date: 21  No Show: 0  Canceled Appointment: 0  Progress Note Counter: 3/8  (PN due 21)    Subjective: Pt reports pain levels are more controlled. Pt reports going to pool when able however limited due to dentist appt and had a reaction to antibiotic pills. Comments: RTD 21  HEP Compliance:  [x] Good [] Fair [] Poor [] Reports not doing due to:    Vital Signs  Patient Currently in Pain: Denies(no pain at rest)   Pain Screening  Patient Currently in Pain: Denies(no pain at rest)  Pain Assessment  Pain Level: 5(with stair climbing or excessive mvmt)  Pain Location: Knee  Pain Orientation: Right  Pain Descriptors: Aching  Pain Frequency: Continuous    OBJECTIVE:   Exercises  Exercise 1: WBAT, no leg press 4-6 weeks, closed chain ex preferred, 10-15 pound limit. Bike 3-4 weeks DOS 10-15, quad estim 4 weeks  Exercise 3: hip add with ball 5 s x 20  Exercise 4: LAQ 3#  wt x  3s x 10  Exercise 7: ham curl RTB x 15  Exercise 8: standing walking [place]  x 3 min without UE support  Exercise 9: rocker board x 30 <->  Exercise 10: Total gym level squat and heel raise L10 x 20  Exercise 11: hip IR ER R seated tband 10 deg from neutral RTB x 10  Exercise 20: HEP: Sink ex and seated ex     Strength: [x] NT  [] MMT completed:   ROM: [x] NT  [] ROM measurements:         Manual:   Manual therapy  Joint mobilization: patellar mob 5 min with cross friction massage patellar and quad tendon.  Patellar tilt with lateral cross x. 5 min  Soft Tissue Mobalization: ant tibialis and post ham string. 4 min        *Indicates exercise, modality, or manual techniques to be initiated when appropriate    Assessment: Body structures, Functions, Activity limitations: Decreased functional mobility , Decreased endurance, Decreased posture, Increased pain, Decreased ROM, Decreased strength, Decreased balance  Assessment: Pt with muscular tightness anterior tibialis and lat quad/IT band decreased after manual techniques. Pt educated on self performance for HEP. Pt verbalizing difficulty doing Indep however will ask  for assistance. Anticipate DC next week to HEP Indep and aquatic program.  Treatment Diagnosis: R knee pain with functional deficits. Prognosis: Good       Goals:  Short term goals  Time Frame for Short term goals: 4 weeks  Short term goal 1: Initiate written HEP for symptom management. Short term goal 2: Decrease R knee pain 50% to assist with improved functional gains. Short term goal 3: R knee PROM 0-100 deg to improve overall function. Long term goals  Time Frame for Long term goals : 8 weeks  Long term goal 1: Indep HEP for symptom management  Long term goal 2: Pt demo improved overall function by reporting greater than 90% per functional survey score  Long term goal 3: Improve R LE strength 4/5 to allow patient to recip stair climbing  Long term goal 4: Ambulate with least restrictive device safe/Indep community distances with min to no deviations. Long term goal 5: Pain-free AROM knee 0-110 to WNL allowing an increase in ADL tolerance  Progress toward goals:ongoing, working on ROM and strengthening to improve overall stability      POST-PAIN       Pain Rating (0-10 pain scale):   0/10   Location and pain description same as pre-treatment unless indicated.    Action: [x] NA   [] Perform HEP  [] Meds as prescribed  [] Modalities as prescribed   [] Call Physician     Frequency/Duration:  Plan  Times per week: 2  Plan weeks: 4  Current Treatment Recommendations: Strengthening, ROM, Neuromuscular Re-education, Home Exercise Program, Manual Therapy - Soft Tissue Mobilization, Manual Therapy - Joint Manipulation, Functional Mobility Training, Transfer Training, Gait Training, Modalities, Stair training, Aquatics  Plan Comment: yossi MCGEE next visit. Pt to continue current HEP. See objective section for any therapeutic exercise changes, additions or modifications this date.     PT Individual Minutes  Time In: 1120  Time Out: 5108  Minutes: 48  Timed Code Treatment Minutes: 43 Minutes  Procedure Minutes:NA   Timed Activity Minutes Units   Ther Ex 34 2   Manual  9 1       Signature:  Electronically signed by Tim Bailey PT on 1/20/21 at 12:58 PM EST

## 2021-01-27 ENCOUNTER — HOSPITAL ENCOUNTER (OUTPATIENT)
Dept: PHYSICAL THERAPY | Age: 81
Setting detail: THERAPIES SERIES
Discharge: HOME | End: 2021-01-27
Payer: MEDICARE

## 2021-01-27 PROCEDURE — 97110 THERAPEUTIC EXERCISES: CPT

## 2021-01-27 PROCEDURE — 97140 MANUAL THERAPY 1/> REGIONS: CPT

## 2021-01-27 ASSESSMENT — PAIN DESCRIPTION - ORIENTATION: ORIENTATION: RIGHT

## 2021-01-27 NOTE — PROGRESS NOTES
[address]     Ph: [phone]  Fax: [phone]    [] Certification  [] Recertification []  Plan of Care  [] Progress Note [x] Discharge      To:  Shalom Montiel      From:  Christine Newton, PT  Patient: Cesilia Sinha     : 1940  Diagnosis: R unilateral knee replacement, pain     Date: 2021  Treatment Diagnosis: R knee pain with functional deficits. Plan of Care/Certification Expiration Date: 21  Progress Report Period from:  2020  to 2021    Total # of Visits to Date: 21   No Show: 0    Canceled Appointment: 0     OBJECTIVE:   Short Term Goals - Time Frame for Short term goals: 4 weeks    Goals Current/Discharge status  Met   Short term goal 1: Initiate written HEP for symptom management. Written HEP provided  for symptom management   [x] yes  [] no   Short term goal 2: Decrease R knee pain 50% to assist with improved functional gains. Pain Location: Knee    Pain Level: 1-3    Pain Descriptors: Aching [x] yes  [] no   Short term goal 3: R knee PROM 0-100 deg to improve overall function. PROM LLE (degrees)  LLE General PROM: 115 flex    [] yes  [] no     Long Term Goals - Time Frame for Long term goals : 8 weeks  Goals Current/ Discharge status Met   Long term goal 1: Indep HEP for symptom management Written HEP provided  for symptom management   [x] yes  [] no   Long term goal 2: Pt demo improved overall function by reporting greater than 90% per functional survey score Exam: LEFS 31/80= 39% functional   [] yes  [x] no   Long term goal 3: Improve R LE strength 4/5 to allow patient to recip stair climbing Strength RLE  Comment: Hip flex 3+/5 and abd to 4-/5,IR 4-/5, ER 3+/5 knee 4/5 Quad, Ham 4-/5    [x] yes  [x] no   Long term goal 4: Ambulate with least restrictive device safe/Indep community distances with min to no deviations.     Pt ambulates Indep community distances without AD. [x] yes  [] no   Long term goal 5: Pain-free AROM knee 0-110 to WNL allowing an increase in ADL tolerance AROM LLE (degrees)  LLE General AROM: knee 0-110 [x] yes  [] no        Body structures, Functions, Activity limitations: Decreased functional mobility , Decreased endurance, Decreased posture, Increased pain, Decreased ROM, Decreased strength, Decreased balance  Assessment: Pt progressed well toward goals. Improved strength and ROM since onset PT with improved functional tolerance and increase ambulation distance. Pt has good knoweldge HEP for land and aquatic to cont with benefits of Indep management to improve overall function and strength as indicated. Prognosis: Good      PLAN: [x]   Frequency/Duration:  Plan  Current Treatment Recommendations: Strengthening, ROM, Neuromuscular Re-education, Home Exercise Program, Manual Therapy - Soft Tissue Mobilization, Manual Therapy - Joint Manipulation, Functional Mobility Training, Transfer Training, Gait Training, Modalities, Stair training, Aquatics  Plan Comment: DC PT this  visit. Patient Status:[] Continue/ Initiate plan of Care    [x] Discharge PT. Recommend pt continue with HEP. [] Additional visits requested, Please re-certify for additional visits:          Signature: Electronically signed by Joanne Garcia PT on 1/27/21 at 12:52 PM EST      If you have any questions or concerns, please don't hesitate to call. Thank you for your referral.    I have reviewed this plan of care and certify a need for medically necessary rehabilitation services.     Physician Signature:__________________________________________________________  Date:  Please sign and return

## 2021-01-27 NOTE — PROGRESS NOTES
[address] [address]  Outpatient Physical Therapy    Treatment Note        Date: 2021  Patient: lAona Higginbotham  : 1940  ACCT #: [de-identified]  Referring Practitioner: Norberto Davis  Diagnosis: R unilateral knee replacement, pain    Visit Information:  PT Visit Information  Onset Date: 10/15/20  PT Insurance Information: [employer]  Total # of Visits Approved: (BMN)  Total # of Visits to Date:   Plan of Care/Certification Expiration Date: 21  No Show: 0  Canceled Appointment: 0  Progress Note Counter:   (PN due 21)    Subjective: Pt reports seeing doctor next week and plans to discharge this date and had several questions ansered this date. Comments: RTD 2-  HEP Compliance:  [x] Good [] Fair [] Poor [] Reports not doing due to:    Vital Signs  Patient Currently in Pain: Yes(no pain at rest)   Pain Screening  Patient Currently in Pain: Yes(no pain at rest)  Pain Assessment  Pain Level: 3  Pain Location: Knee  Pain Orientation: Right  Pain Descriptors: Aching    OBJECTIVE:   Exercises  Exercise 1: WBAT, no leg press 4-6 weeks, closed chain ex preferred, 10-15 pound limit. Bike 3-4 weeks DOS 10-15, quad estim 4 weeks  Exercise 3: hip add with ball 5 s x 20  Exercise 4: LAQ 3#  wt x  3s x 10  Exercise 7: ham curl RTB x 15  Exercise 8: standing walking march  x 3 min without UE support  Exercise 9: rocker board x 30 <->  Exercise 11: hip IR ER R seated tband 10 deg from neutral RTB x 10  Exercise 20: HEP: Sink ex and seated ex       Strength: [] NT  [x] MMT completed:  Strength RLE  Comment: Hip flex 3+/5 and abd to 4-/5,IR 4-/5, ER 3+/5 knee 4/5 Quad, Ham 4-/5     ROM: [] NT  [x] ROM measurements:        PROM LLE (degrees)  LLE General PROM: 115 flex  AROM LLE (degrees)  LLE General AROM: knee 0-110   Manual:   Manual therapy  Joint mobilization: tibial glide 5 min  Soft Tissue Mobalization: ant tibialis and post ham string.  4 min  Other: 5 min MMT/goal assessment      *Indicates exercise, modality, or manual techniques to be initiated when appropriate    Assessment: Body structures, Functions, Activity limitations: Decreased functional mobility , Decreased endurance, Decreased posture, Increased pain, Decreased ROM, Decreased strength, Decreased balance  Assessment: Pt progressed well toward goals. Improved strength and ROM since onset PT with improved functional tolerance and increase ambulation distance. Pt has good knoweldge HEP for land and aquatic to cont with benefits of Indep managment. Treatment Diagnosis: R knee pain with functional deficits. Prognosis: Good     Goals:  Short term goals  Time Frame for Short term goals: 4 weeks  Short term goal 1: Initiate written HEP for symptom management. Short term goal 2: Decrease R knee pain 50% to assist with improved functional gains. Short term goal 3: R knee PROM 0-100 deg to improve overall function. Long term goals  Time Frame for Long term goals : 8 weeks  Long term goal 1: Indep HEP for symptom management  Long term goal 2: Pt demo improved overall function by reporting greater than 90% per functional survey score  Long term goal 3: Improve R LE strength 4/5 to allow patient to recip stair climbing  Long term goal 4: Ambulate with least restrictive device safe/Indep community distances with min to no deviations. Long term goal 5: Pain-free AROM knee 0-110 to WNL allowing an increase in ADL tolerance  Progress toward goals:See DC summary this date. POST-PAIN       Pain Rating (0-10 pain scale):  2 /10   Location and pain description same as pre-treatment unless indicated.    Action: [x] NA   [] Perform HEP  [] Meds as prescribed  [] Modalities as prescribed   [] Call Physician     Frequency/Duration:  Plan  Current Treatment Recommendations: Strengthening, ROM, Neuromuscular Re-education, Home Exercise Program, Manual Therapy - Soft Tissue Mobilization, Manual Therapy - Joint Manipulation, Functional Mobility Training, Transfer Training, Gait Training, Modalities, Stair training, Aquatics  Plan Comment: DC PT this  visit. Pt to continue current HEP. See objective section for any therapeutic exercise changes, additions or modifications this date.     PT Individual Minutes  Time In: 1120  Time Out: 1200  Minutes: 40  Timed Code Treatment Minutes: 40 Minutes  Procedure Minutes:NA     Timed Activity Minutes Units   Manual 14 1   Ther Ex 26 2       Signature:  Electronically signed by Johnny Emanuel PT on 1/27/21 at 11:53 AM EST

## 2021-08-09 ENCOUNTER — APPOINTMENT (OUTPATIENT)
Dept: ULTRASOUND IMAGING | Age: 81
End: 2021-08-09
Payer: MEDICARE

## 2021-08-09 ENCOUNTER — APPOINTMENT (OUTPATIENT)
Dept: GENERAL RADIOLOGY | Age: 81
End: 2021-08-09
Payer: MEDICARE

## 2021-08-09 ENCOUNTER — APPOINTMENT (OUTPATIENT)
Dept: CT IMAGING | Age: 81
End: 2021-08-09
Payer: MEDICARE

## 2021-08-09 ENCOUNTER — HOSPITAL ENCOUNTER (EMERGENCY)
Age: 81
Discharge: HOME | End: 2021-08-09
Payer: MEDICARE

## 2021-08-09 VITALS
HEIGHT: 60 IN | BODY MASS INDEX: 36.32 KG/M2 | HEART RATE: 82 BPM | TEMPERATURE: 97 F | SYSTOLIC BLOOD PRESSURE: 95 MMHG | RESPIRATION RATE: 18 BRPM | DIASTOLIC BLOOD PRESSURE: 82 MMHG | OXYGEN SATURATION: 100 % | WEIGHT: 185 LBS

## 2021-08-09 DIAGNOSIS — K80.20 CALCULUS OF GALLBLADDER WITHOUT CHOLECYSTITIS WITHOUT OBSTRUCTION: ICD-10-CM

## 2021-08-09 DIAGNOSIS — K80.20 GALLBLADDER COLIC: Primary | ICD-10-CM

## 2021-08-09 LAB
ALBUMIN SERPL-MCNC: 4.1 G/DL (ref 3.5–4.6)
ALP BLD-CCNC: 74 U/L (ref 40–130)
ALT SERPL-CCNC: 24 U/L (ref 0–33)
ANION GAP SERPL CALCULATED.3IONS-SCNC: 11 MEQ/L (ref 9–15)
AST SERPL-CCNC: 37 U/L (ref 0–35)
BASOPHILS ABSOLUTE: 0.1 K/UL (ref 0–0.2)
BASOPHILS RELATIVE PERCENT: 1.3 %
BILIRUB SERPL-MCNC: 0.5 MG/DL (ref 0.2–0.7)
BUN BLDV-MCNC: 19 MG/DL (ref 8–23)
CALCIUM SERPL-MCNC: 9.6 MG/DL (ref 8.5–9.9)
CHLORIDE BLD-SCNC: 103 MEQ/L (ref 95–107)
CO2: 26 MEQ/L (ref 20–31)
CREAT SERPL-MCNC: 0.87 MG/DL (ref 0.5–0.9)
EKG ATRIAL RATE: 77 BPM
EKG P AXIS: 51 DEGREES
EKG P-R INTERVAL: 174 MS
EKG Q-T INTERVAL: 376 MS
EKG QRS DURATION: 88 MS
EKG QTC CALCULATION (BAZETT): 425 MS
EKG R AXIS: -9 DEGREES
EKG T AXIS: 1 DEGREES
EKG VENTRICULAR RATE: 77 BPM
EOSINOPHILS ABSOLUTE: 0.5 K/UL (ref 0–0.7)
EOSINOPHILS RELATIVE PERCENT: 6.2 %
GFR AFRICAN AMERICAN: >60
GFR NON-AFRICAN AMERICAN: >60
GLOBULIN: 3.4 G/DL (ref 2.3–3.5)
GLUCOSE BLD-MCNC: 101 MG/DL (ref 70–99)
HCT VFR BLD CALC: 44 % (ref 37–47)
HEMOGLOBIN: 14.5 G/DL (ref 12–16)
LACTIC ACID: 2.2 MMOL/L (ref 0.5–2.2)
LIPASE: 38 U/L (ref 12–95)
LYMPHOCYTES ABSOLUTE: 2.4 K/UL (ref 1–4.8)
LYMPHOCYTES RELATIVE PERCENT: 30.8 %
MCH RBC QN AUTO: 30.1 PG (ref 27–31.3)
MCHC RBC AUTO-ENTMCNC: 32.9 % (ref 33–37)
MCV RBC AUTO: 91.7 FL (ref 82–100)
MONOCYTES ABSOLUTE: 0.6 K/UL (ref 0.2–0.8)
MONOCYTES RELATIVE PERCENT: 8.1 %
NEUTROPHILS ABSOLUTE: 4.2 K/UL (ref 1.4–6.5)
NEUTROPHILS RELATIVE PERCENT: 53.6 %
PDW BLD-RTO: 13.7 % (ref 11.5–14.5)
PLATELET # BLD: 283 K/UL (ref 130–400)
POC CREATININE WHOLE BLOOD: 1
POTASSIUM SERPL-SCNC: 5.2 MEQ/L (ref 3.4–4.9)
RBC # BLD: 4.8 M/UL (ref 4.2–5.4)
SODIUM BLD-SCNC: 140 MEQ/L (ref 135–144)
TOTAL PROTEIN: 7.5 G/DL (ref 6.3–8)
TROPONIN: <0.01 NG/ML (ref 0–0.01)
WBC # BLD: 7.7 K/UL (ref 4.8–10.8)

## 2021-08-09 PROCEDURE — 76705 ECHO EXAM OF ABDOMEN: CPT

## 2021-08-09 PROCEDURE — 6360000004 HC RX CONTRAST MEDICATION: Performed by: PHYSICIAN ASSISTANT

## 2021-08-09 PROCEDURE — 36415 COLL VENOUS BLD VENIPUNCTURE: CPT

## 2021-08-09 PROCEDURE — 80053 COMPREHEN METABOLIC PANEL: CPT

## 2021-08-09 PROCEDURE — 83605 ASSAY OF LACTIC ACID: CPT

## 2021-08-09 PROCEDURE — 83690 ASSAY OF LIPASE: CPT

## 2021-08-09 PROCEDURE — 93010 ELECTROCARDIOGRAM REPORT: CPT | Performed by: INTERNAL MEDICINE

## 2021-08-09 PROCEDURE — 93005 ELECTROCARDIOGRAM TRACING: CPT | Performed by: PHYSICIAN ASSISTANT

## 2021-08-09 PROCEDURE — 99285 EMERGENCY DEPT VISIT HI MDM: CPT

## 2021-08-09 PROCEDURE — 71045 X-RAY EXAM CHEST 1 VIEW: CPT

## 2021-08-09 PROCEDURE — 6360000002 HC RX W HCPCS: Performed by: PHYSICIAN ASSISTANT

## 2021-08-09 PROCEDURE — 2500000003 HC RX 250 WO HCPCS: Performed by: PHYSICIAN ASSISTANT

## 2021-08-09 PROCEDURE — 2580000003 HC RX 258: Performed by: PHYSICIAN ASSISTANT

## 2021-08-09 PROCEDURE — 84484 ASSAY OF TROPONIN QUANT: CPT

## 2021-08-09 PROCEDURE — 96375 TX/PRO/DX INJ NEW DRUG ADDON: CPT

## 2021-08-09 PROCEDURE — 74177 CT ABD & PELVIS W/CONTRAST: CPT

## 2021-08-09 PROCEDURE — 85025 COMPLETE CBC W/AUTO DIFF WBC: CPT

## 2021-08-09 PROCEDURE — 96374 THER/PROPH/DIAG INJ IV PUSH: CPT

## 2021-08-09 RX ORDER — 0.9 % SODIUM CHLORIDE 0.9 %
1000 INTRAVENOUS SOLUTION INTRAVENOUS ONCE
Status: COMPLETED | OUTPATIENT
Start: 2021-08-09 | End: 2021-08-09

## 2021-08-09 RX ORDER — HYDROCODONE BITARTRATE AND ACETAMINOPHEN 5; 325 MG/1; MG/1
1 TABLET ORAL EVERY 8 HOURS PRN
Qty: 9 TABLET | Refills: 0 | Status: SHIPPED | OUTPATIENT
Start: 2021-08-09 | End: 2021-08-12

## 2021-08-09 RX ORDER — SODIUM CHLORIDE 0.9 % (FLUSH) 0.9 %
10 SYRINGE (ML) INJECTION ONCE
Status: DISCONTINUED | OUTPATIENT
Start: 2021-08-09 | End: 2021-08-09 | Stop reason: HOSPADM

## 2021-08-09 RX ORDER — KETOROLAC TROMETHAMINE 15 MG/ML
15 INJECTION, SOLUTION INTRAMUSCULAR; INTRAVENOUS ONCE
Status: COMPLETED | OUTPATIENT
Start: 2021-08-09 | End: 2021-08-09

## 2021-08-09 RX ORDER — ONDANSETRON 4 MG/1
4-8 TABLET, ORALLY DISINTEGRATING ORAL EVERY 12 HOURS PRN
Qty: 12 TABLET | Refills: 0 | Status: ON HOLD | OUTPATIENT
Start: 2021-08-09 | End: 2021-08-30

## 2021-08-09 RX ORDER — ONDANSETRON 2 MG/ML
4 INJECTION INTRAMUSCULAR; INTRAVENOUS ONCE
Status: COMPLETED | OUTPATIENT
Start: 2021-08-09 | End: 2021-08-09

## 2021-08-09 RX ADMIN — ONDANSETRON 4 MG: 2 INJECTION INTRAMUSCULAR; INTRAVENOUS at 09:52

## 2021-08-09 RX ADMIN — IOPAMIDOL 100 ML: 755 INJECTION, SOLUTION INTRAVENOUS at 10:17

## 2021-08-09 RX ADMIN — FAMOTIDINE 20 MG: 10 INJECTION, SOLUTION INTRAVENOUS at 09:55

## 2021-08-09 RX ADMIN — KETOROLAC TROMETHAMINE 15 MG: 15 INJECTION, SOLUTION INTRAMUSCULAR; INTRAVENOUS at 09:53

## 2021-08-09 RX ADMIN — SODIUM CHLORIDE 1000 ML: 9 INJECTION, SOLUTION INTRAVENOUS at 09:49

## 2021-08-09 ASSESSMENT — PAIN DESCRIPTION - DESCRIPTORS
DESCRIPTORS: PRESSURE
DESCRIPTORS: PRESSURE

## 2021-08-09 ASSESSMENT — ENCOUNTER SYMPTOMS
SORE THROAT: 0
VOMITING: 0
COUGH: 0
ABDOMINAL PAIN: 1
NAUSEA: 1
PHOTOPHOBIA: 0
EYE PAIN: 0
DIARRHEA: 0
RHINORRHEA: 0
BACK PAIN: 0
SHORTNESS OF BREATH: 0

## 2021-08-09 ASSESSMENT — PAIN DESCRIPTION - FREQUENCY: FREQUENCY: CONTINUOUS

## 2021-08-09 ASSESSMENT — PAIN DESCRIPTION - LOCATION
LOCATION: ABDOMEN
LOCATION: ABDOMEN;CHEST

## 2021-08-09 ASSESSMENT — PAIN SCALES - GENERAL
PAINLEVEL_OUTOF10: 7
PAINLEVEL_OUTOF10: 4
PAINLEVEL_OUTOF10: 8

## 2021-08-09 ASSESSMENT — PAIN DESCRIPTION - ORIENTATION
ORIENTATION: MID;LOWER;UPPER
ORIENTATION: RIGHT;LEFT;MID

## 2021-08-09 NOTE — ED PROVIDER NOTES
[address] ED  eMERGENCY dEPARTMENTeNCOUnter      Pt Name: Darren Middleton  MRN: [id number]  [place] 1940  Date ofevaluation: 8/9/2021  Provider: Khloe Agosto PA-C    CHIEF COMPLAINT       Chief Complaint   Patient presents with    Abdominal Pain     lower mid abd pain that goes straight up in the middle to the chest for the last 3 days with nausea         HISTORY OF PRESENT ILLNESS   (Location/Symptom, Timing/Onset,Context/Setting, Quality, Duration, Modifying Factors, Severity)  Note limiting factors. Darren Middleton is a 80 y.o. female who presents to the emergency department intermittent right upper quadrant epigastric pain for the last 3 days. Patient states she wakes up with this pain and that it usually comes down on its own. Patient states the pain happened around 4 AM this morning it was a squeezing type pain and she had associated nausea. Patient thinks that it might be her gallbladder as she was told that she had stones before. Patient states last night she had pizza before bed. She states that she has intermittently had this pain in the past 2. She has been having this pain so she seen gastroenterology who did an endoscopy and colonoscopy that was grossly unremarkable and had benign findings. She denies any chest pressure. HPI    NursingNotes were reviewed. REVIEW OF SYSTEMS    (2-9 systems for level 4, 10 or more for level 5)     Review of Systems   Constitutional: Negative for chills, diaphoresis, fatigue and fever. HENT: Negative for congestion, rhinorrhea and sore throat. Eyes: Negative for photophobia and pain. Respiratory: Negative for cough and shortness of breath. Cardiovascular: Negative for chest pain and palpitations. Gastrointestinal: Positive for abdominal pain and nausea. Negative for diarrhea and vomiting. Genitourinary: Negative for dysuria and flank pain. Musculoskeletal: Negative for back pain. Skin: Negative for rash.    Neurological: Negative for dizziness, light-headedness and headaches. Psychiatric/Behavioral: Negative. All other systems reviewed and are negative. Except as noted above the remainder of the review of systems was reviewed and negative. PAST MEDICAL HISTORY   No past medical history on file. SURGICALHISTORY     No past surgical history on file. CURRENT MEDICATIONS       Previous Medications    No medications on file       ALLERGIES     Codeine    FAMILY HISTORY     No family history on file. SOCIAL HISTORY       Social History     Socioeconomic History    Marital status:      Spouse name: Not on file    Number of children: Not on file    Years of education: Not on file    Highest education level: Not on file   Occupational History    Not on file   Tobacco Use    Smoking status: Not on file   Substance and Sexual Activity    Alcohol use: Not on file    Drug use: Not on file    Sexual activity: Not on file   Other Topics Concern    Not on file   Social History Narrative    Not on file     Social Determinants of Health     Financial Resource Strain:     Difficulty of Paying Living Expenses:    Food Insecurity:     Worried About Running Out of Food in the Last Year:     [address] in the Last Year:    Transportation Needs:     Lack of Transportation (Medical):      Lack of Transportation (Non-Medical):    Physical Activity:     Days of Exercise per Week:     Minutes of Exercise per Session:    Stress:     Feeling of Stress :    Social Connections:     Frequency of Communication with Friends and Family:     Frequency of Social Gatherings with Friends and Family:     Attends Mu-ism Services:     Active Member of Clubs or Organizations:     Attends Club or Organization Meetings:     Marital Status:    Intimate Partner Violence:     Fear of Current or Ex-Partner:     Emotionally Abused:     Physically Abused:     Sexually Abused:        SCREENINGS    Stuart Coma Scale  Eye Opening: Spontaneous  Best Verbal Response: Oriented  Best Motor Response: Obeys commands  Emmy Coma Scale Score: 15 @FLOW([id number])@      PHYSICAL EXAM    (up to 7 for level 4, 8 or more for level 5)     ED Triage Vitals [08/09/21 0800]   BP Temp Temp Source Pulse Resp SpO2 Height Weight   (!) 149/88 97 °F (36.1 °C) Temporal 71 17 98 % 5' (1.524 m) 185 lb (83.9 kg)       Physical Exam  Vitals and nursing note reviewed. Constitutional:       General: She is not in acute distress. Appearance: Normal appearance. She is well-developed. She is not diaphoretic. HENT:      Head: Normocephalic and atraumatic. Eyes:      General: Lids are normal.      Conjunctiva/sclera: Conjunctivae normal.   Cardiovascular:      Rate and Rhythm: Normal rate and regular rhythm. Pulses: Normal pulses. Heart sounds: Normal heart sounds. Pulmonary:      Effort: Pulmonary effort is normal.      Breath sounds: Normal breath sounds. Abdominal:      General: Bowel sounds are normal.      Palpations: Abdomen is soft. Tenderness: There is generalized abdominal tenderness. Negative signs include Hubbard's sign. Musculoskeletal:      Cervical back: Normal range of motion and neck supple. Lymphadenopathy:      Cervical: No cervical adenopathy. Skin:     General: Skin is warm and dry. Capillary Refill: Capillary refill takes less than 2 seconds. Findings: No rash. Neurological:      Mental Status: She is alert and oriented to person, place, and time. Psychiatric:         Thought Content:  Thought content normal.         Judgment: Judgment normal.         DIAGNOSTIC RESULTS     EKG: All EKG's are interpreted by the Emergency Department Physician who either signs or Co-signsthis chart in the absence of a cardiologist.    nsr 77bpm no stemi no ectopy     RADIOLOGY:   Non-plain filmimages such as CT, Ultrasound and MRI are read by the radiologist. Plain radiographic images are visualized and preliminarily interpreted by the emergency physician with the below findings:        Interpretation per the Radiologist below, if available at the time ofthis note:    [address] [place] [address]   Final Result      Cholelithiasis without other sonographic evidence of acute cholecystitis. Hepatic steatosis without focal hepatic lesion. CT ABDOMEN PELVIS W IV CONTRAST Additional Contrast? None   Final Result   Impression:        1. Cholelithiasis with stones in the gallbladder neck. XR CHEST PORTABLE    (Results Pending)         ED BEDSIDE ULTRASOUND:   Performed by ED Physician - none    LABS:  Labs Reviewed   COMPREHENSIVE METABOLIC PANEL - Abnormal; Notable for the following components:       Result Value    Potassium 5.2 (*)     Glucose 101 (*)     AST 37 (*)     All other components within normal limits   CBC WITH AUTO DIFFERENTIAL - Abnormal; Notable for the following components:    MCHC 32.9 (*)     All other components within normal limits   POCT CREATININE - URINE - Normal   LIPASE   LACTIC ACID, PLASMA   TROPONIN   URINE RT REFLEX TO CULTURE       All other labs were within normal range or not returned as of this dictation. EMERGENCY DEPARTMENT COURSE and DIFFERENTIAL DIAGNOSIS/MDM:   Vitals:    Vitals:    08/09/21 0800 08/09/21 0846 08/09/21 0946 08/09/21 1054   BP: (!) 149/88 (!) 156/81  95/82   Pulse: 71 71 74 82   Resp: 17 14 15 18   Temp: 97 °F (36.1 °C)      TempSrc: Temporal      SpO2: 98% 98%  100%   Weight: 185 lb (83.9 kg)      Height: 5' (1.524 m)              MDM     Patient is nontoxic no acute distress afebrile hemodynamically stable her pain has mostly resolved since coming to the emergency department she has been having these abdominal pains for the last 3 days when she wakes up she has been eating sloppy [employer] and pizza the night before the attacks happen. She was told previously that she was thought to have gallstones. Patient denies any fevers or chills.   CT abdomen pelvis remarkable for cholelithiasis without findings for acute cholecystitis. Ultrasound was performed that also shows gallstones with no findings for acute cholecystitis. Exam is nonacute abdomen she has no leukocytosis we will treat as outpatient given a short course of analgesics to take if she has an attack but she is instructed to return to the ED if she does have increased pain to right upper quadrant fevers chills nausea vomiting. She is involved in pain management but I did give her a short course to use for breakthrough pain but I told her that she must discuss this with Dr. Eric Bond before she fills it and she said she would likely probably would wait until she seen Dr. Eric Bond on Friday. Patient is in agreement with this plan. Patient is stable and ready for discharge. REASSESSMENT          CRITICAL CARE TIME   Total Critical Care time was  minutes, excluding separatelyreportable procedures. There was a high probability ofclinically significant/life threatening deterioration in the patient's condition which required my urgent intervention. CONSULTS:  None    PROCEDURES:  Unless otherwise noted below, none     Procedures    FINAL IMPRESSION      1. Gallbladder colic    2. Calculus of gallbladder without cholecystitis without obstruction          DISPOSITION/PLAN   DISPOSITION Decision To Discharge 08/09/2021 11:30:41 AM      PATIENT REFERREDTO:  Flo Almodovar MD  [address] [facility] [phone] [place] [address]    Schedule an appointment as soon as possible for a visit in 2 days        DISCHARGEMEDICATIONS:  New Prescriptions    HYDROCODONE-ACETAMINOPHEN (NORCO) 5-325 MG PER TABLET    Take 1 tablet by mouth every 8 hours as needed for Pain for up to 3 days. Intended supply: 3 days.  Take lowest dose possible to manage pain    ONDANSETRON (ZOFRAN ODT) 4 MG DISINTEGRATING TABLET    Take 1-2 tablets by mouth every 12 hours as needed for Nausea May Sub regular tablet (non-ODT) if insurance does not cover ODT. (Please note that portions of this note were completed with a voice recognition program.  Efforts were made to edit the dictations but occasionally words are mis-transcribed.)    Lacey Pruett PA-C (electronically signed)  Attending Emergency Physician         Lacey Pruett PA-C  08/09/21 1142    Attending Supervisory Note/Shared Visit   I have personally performed a face to face diagnostic evaluation on this patient. I have reviewed the mid-levels findings and agree.   History and Exam by me shows abdominal pain      Darci Costello DO  Attending Emergency Physician         Darci Costello DO  08/11/21 4320

## 2021-08-09 NOTE — ED NOTES
Xray at bedside for portable cxr. PETER Craft at bedside to attempt IV access.       Carlos Enrique Lincoln RN  08/09/21 9867

## 2021-08-10 LAB
GFR AFRICAN AMERICAN: >60
GFR NON-AFRICAN AMERICAN: 53
PERFORMED ON: ABNORMAL
POC CREATININE: 1 MG/DL (ref 0.6–1.2)
POC SAMPLE TYPE: ABNORMAL

## 2021-08-17 ENCOUNTER — OFFICE VISIT (OUTPATIENT)
Dept: SURGERY | Age: 81
End: 2021-08-17
Payer: MEDICARE

## 2021-08-17 VITALS
HEART RATE: 86 BPM | WEIGHT: 185 LBS | HEIGHT: 60 IN | OXYGEN SATURATION: 94 % | TEMPERATURE: 97.4 F | BODY MASS INDEX: 36.32 KG/M2

## 2021-08-17 DIAGNOSIS — K80.10 CALCULUS OF GALLBLADDER WITH CHRONIC CHOLECYSTITIS WITHOUT OBSTRUCTION: Primary | ICD-10-CM

## 2021-08-17 PROCEDURE — 99203 OFFICE O/P NEW LOW 30 MIN: CPT | Performed by: COLON & RECTAL SURGERY

## 2021-08-17 RX ORDER — LOPERAMIDE HYDROCHLORIDE 2 MG/1
2 TABLET ORAL PRN
COMMUNITY
Start: 2020-10-17

## 2021-08-17 RX ORDER — FENTANYL 50 UG/H
PATCH TRANSDERMAL
COMMUNITY
Start: 2021-08-10

## 2021-08-17 RX ORDER — LOVASTATIN 20 MG/1
40 TABLET ORAL
COMMUNITY
Start: 2021-06-13

## 2021-08-17 ASSESSMENT — ENCOUNTER SYMPTOMS
COLOR CHANGE: 0
CHEST TIGHTNESS: 0
NAUSEA: 1
ABDOMINAL PAIN: 1
BLOOD IN STOOL: 0
BACK PAIN: 1
VOMITING: 0
SHORTNESS OF BREATH: 0
CONSTIPATION: 0
DIARRHEA: 0

## 2021-08-17 NOTE — PROGRESS NOTES
Subjective:      Patient ID: Pearl Moore is a 80 y.o. female who presents for:  Chief Complaint   Patient presents with    New Patient       This is a 82-year-old female who was in the emergency department recently with upper abdominal pain and persistent nausea. She had a CAT scan of the abdomen which showed gallstones present. This was followed with an ultrasound which showed the same. I reviewed her CAT scan and ultrasound with her and showed her the abnormalities. I reviewed her blood work from the emergency room visit that showed only a minimal elevation in her transaminases. No previous abdominal operations other than hysterectomy noted in the past.    She has been doing better and her nausea and pain have subsided since ER visit. Patient has had a number of orthopedic interventions including bilateral knee replacement and scoliosis surgery in the past.      No past medical history on file. No past surgical history on file. Social History     Socioeconomic History    Marital status:      Spouse name: Not on file    Number of children: Not on file    Years of education: Not on file    Highest education level: Not on file   Occupational History    Not on file   Tobacco Use    Smoking status: Never Smoker    Smokeless tobacco: Never Used   Substance and Sexual Activity    Alcohol use: Not on file    Drug use: Not on file    Sexual activity: Not on file   Other Topics Concern    Not on file   Social History Narrative    Not on file     Social Determinants of Health     Financial Resource Strain:     Difficulty of Paying Living Expenses:    Food Insecurity:     Worried About Running Out of Food in the Last Year:     [address] in the Last Year:    Transportation Needs:     Lack of Transportation (Medical):      Lack of Transportation (Non-Medical):    Physical Activity:     Days of Exercise per Week:     Minutes of Exercise per Session:    Stress:     Feeling of Stress :    Social Connections:     Frequency of Communication with Friends and Family:     Frequency of Social Gatherings with Friends and Family:     Attends Baptism Services:     Active Member of Clubs or Organizations:     Attends Club or Organization Meetings:     Marital Status:    Intimate Partner Violence:     Fear of Current or Ex-Partner:     Emotionally Abused:     Physically Abused:     Sexually Abused:      No family history on file. Allergies:  Codeine    Review of Systems   Constitutional: Positive for appetite change. Negative for activity change and unexpected weight change. HENT: Negative for congestion. Respiratory: Negative for chest tightness and shortness of breath. Cardiovascular: Negative for chest pain. Gastrointestinal: Positive for abdominal pain and nausea. Negative for blood in stool, constipation, diarrhea and vomiting. Genitourinary: Negative for difficulty urinating. Musculoskeletal: Positive for arthralgias, back pain and neck stiffness. Skin: Negative for color change. Neurological: Negative for dizziness and headaches. Hematological: Does not bruise/bleed easily. Psychiatric/Behavioral: Negative for agitation. Objective:    Pulse 86   Temp 97.4 °F (36.3 °C) (Temporal)   Ht 5' (1.524 m)   Wt 185 lb (83.9 kg)   SpO2 94%   BMI 36.13 kg/m²     Physical Exam  Constitutional:       Appearance: She is well-developed. HENT:      Head: Normocephalic and atraumatic. Eyes:      Pupils: Pupils are equal, round, and reactive to light. Cardiovascular:      Rate and Rhythm: Normal rate and regular rhythm. Heart sounds: Normal heart sounds. Pulmonary:      Effort: Pulmonary effort is normal. No respiratory distress. Breath sounds: Normal breath sounds. No wheezing. Abdominal:      General: There is no distension. Palpations: There is no mass. Tenderness: There is abdominal tenderness. There is no guarding or rebound. Hernia: No hernia is present. Musculoskeletal:         General: Normal range of motion. Cervical back: Normal range of motion and neck supple. Comments: Bilateral knee replacement incisions are healed well    She has a right shin bruise from her recent injury during swimming. Skin:     General: Skin is warm and dry. Coloration: Skin is not pale. Findings: No erythema or rash. Neurological:      Mental Status: She is alert and oriented to person, place, and time. Psychiatric:         Behavior: Behavior normal.         Judgment: Judgment normal.              Assessment/Plan:          Diagnosis Orders   1. Calculus of gallbladder with chronic cholecystitis without obstruction       I showed with anatomic diagrams as well as her imaging studies, the risks and benefits of laparoscopic possible open cholecystectomy with cholangiogram.    Risks of the procedure including infection, bleeding, damage to the common bile duct, bile leak reoperation and failure to relieve symptoms addressed. Postoperative course and follow-up described as well. Nonoperative alternatives were given but patient does not wish to proceed. Patient wishes to proceed with laparoscopic possible open cholecystectomy and cholangiogram.  Arrangements were made for August 30, 2021. PAT given. Consent obtained. Please note this report has beenpartially produced using speech recognition software and may cause contain errors related to that system including grammar, punctuation and spelling as well as words and phrases that may seem inappropriate.  If there arequestions or concerns please feel free to contact me to clarify

## 2021-08-23 ENCOUNTER — HOSPITAL ENCOUNTER (OUTPATIENT)
Dept: PREADMISSION TESTING | Age: 81
Discharge: HOME | End: 2021-08-27
Payer: MEDICARE

## 2021-08-23 VITALS
DIASTOLIC BLOOD PRESSURE: 68 MMHG | HEART RATE: 82 BPM | RESPIRATION RATE: 16 BRPM | HEIGHT: 61 IN | BODY MASS INDEX: 40.44 KG/M2 | OXYGEN SATURATION: 95 % | SYSTOLIC BLOOD PRESSURE: 123 MMHG | WEIGHT: 214.2 LBS | TEMPERATURE: 97.2 F

## 2021-08-23 RX ORDER — NALOXONE HYDROCHLORIDE 4 MG/.1ML
SPRAY NASAL
Status: ON HOLD | COMMUNITY
Start: 2020-10-15 | End: 2021-08-30

## 2021-08-23 RX ORDER — GUAIFENESIN 600 MG/1
TABLET, EXTENDED RELEASE ORAL
COMMUNITY

## 2021-08-23 RX ORDER — B-COMPLEX WITH VITAMIN C
TABLET ORAL
COMMUNITY

## 2021-08-23 RX ORDER — GABAPENTIN 600 MG/1
TABLET, FILM COATED ORAL
COMMUNITY

## 2021-08-23 RX ORDER — M-VIT,TX,IRON,MINS/CALC/FOLIC 27MG-0.4MG
1 TABLET ORAL DAILY
COMMUNITY

## 2021-08-30 ENCOUNTER — APPOINTMENT (OUTPATIENT)
Dept: GENERAL RADIOLOGY | Age: 81
End: 2021-08-30
Attending: COLON & RECTAL SURGERY
Payer: MEDICARE

## 2021-08-30 ENCOUNTER — ANESTHESIA (OUTPATIENT)
Dept: OPERATING ROOM | Age: 81
End: 2021-08-30
Payer: MEDICARE

## 2021-08-30 ENCOUNTER — HOSPITAL ENCOUNTER (OUTPATIENT)
Age: 81
Setting detail: OUTPATIENT SURGERY
Discharge: HOME | End: 2021-08-30
Attending: COLON & RECTAL SURGERY | Admitting: COLON & RECTAL SURGERY
Payer: MEDICARE

## 2021-08-30 ENCOUNTER — ANESTHESIA EVENT (OUTPATIENT)
Dept: OPERATING ROOM | Age: 81
End: 2021-08-30
Payer: MEDICARE

## 2021-08-30 VITALS
SYSTOLIC BLOOD PRESSURE: 156 MMHG | OXYGEN SATURATION: 97 % | HEIGHT: 61 IN | BODY MASS INDEX: 35.87 KG/M2 | TEMPERATURE: 97.5 F | RESPIRATION RATE: 16 BRPM | WEIGHT: 190 LBS | HEART RATE: 97 BPM | DIASTOLIC BLOOD PRESSURE: 81 MMHG

## 2021-08-30 VITALS — DIASTOLIC BLOOD PRESSURE: 70 MMHG | TEMPERATURE: 97.7 F | SYSTOLIC BLOOD PRESSURE: 173 MMHG | OXYGEN SATURATION: 82 %

## 2021-08-30 DIAGNOSIS — K80.11 CALCULUS OF GALLBLADDER WITH CHRONIC CHOLECYSTITIS WITH OBSTRUCTION: Primary | ICD-10-CM

## 2021-08-30 PROCEDURE — 3600000004 HC SURGERY LEVEL 4 BASE: Performed by: COLON & RECTAL SURGERY

## 2021-08-30 PROCEDURE — 64488 TAP BLOCK BI INJECTION: CPT | Performed by: STUDENT IN AN ORGANIZED HEALTH CARE EDUCATION/TRAINING PROGRAM

## 2021-08-30 PROCEDURE — 7100000010 HC PHASE II RECOVERY - FIRST 15 MIN: Performed by: COLON & RECTAL SURGERY

## 2021-08-30 PROCEDURE — 6360000002 HC RX W HCPCS: Performed by: STUDENT IN AN ORGANIZED HEALTH CARE EDUCATION/TRAINING PROGRAM

## 2021-08-30 PROCEDURE — 47564 LAPARO CHOLECYSTECTOMY/EXPLR: CPT | Performed by: COLON & RECTAL SURGERY

## 2021-08-30 PROCEDURE — 2580000003 HC RX 258: Performed by: COLON & RECTAL SURGERY

## 2021-08-30 PROCEDURE — 3600000014 HC SURGERY LEVEL 4 ADDTL 15MIN: Performed by: COLON & RECTAL SURGERY

## 2021-08-30 PROCEDURE — 3700000001 HC ADD 15 MINUTES (ANESTHESIA): Performed by: COLON & RECTAL SURGERY

## 2021-08-30 PROCEDURE — 6370000000 HC RX 637 (ALT 250 FOR IP): Performed by: COLON & RECTAL SURGERY

## 2021-08-30 PROCEDURE — 6360000002 HC RX W HCPCS: Performed by: COLON & RECTAL SURGERY

## 2021-08-30 PROCEDURE — 7100000001 HC PACU RECOVERY - ADDTL 15 MIN: Performed by: COLON & RECTAL SURGERY

## 2021-08-30 PROCEDURE — 2720000010 HC SURG SUPPLY STERILE: Performed by: COLON & RECTAL SURGERY

## 2021-08-30 PROCEDURE — 2500000003 HC RX 250 WO HCPCS: Performed by: NURSE ANESTHETIST, CERTIFIED REGISTERED

## 2021-08-30 PROCEDURE — 2500000003 HC RX 250 WO HCPCS: Performed by: STUDENT IN AN ORGANIZED HEALTH CARE EDUCATION/TRAINING PROGRAM

## 2021-08-30 PROCEDURE — 88304 TISSUE EXAM BY PATHOLOGIST: CPT

## 2021-08-30 PROCEDURE — 7100000000 HC PACU RECOVERY - FIRST 15 MIN: Performed by: COLON & RECTAL SURGERY

## 2021-08-30 PROCEDURE — 2709999900 HC NON-CHARGEABLE SUPPLY: Performed by: COLON & RECTAL SURGERY

## 2021-08-30 PROCEDURE — 6360000002 HC RX W HCPCS: Performed by: NURSE ANESTHETIST, CERTIFIED REGISTERED

## 2021-08-30 PROCEDURE — 3700000000 HC ANESTHESIA ATTENDED CARE: Performed by: COLON & RECTAL SURGERY

## 2021-08-30 PROCEDURE — 7100000011 HC PHASE II RECOVERY - ADDTL 15 MIN: Performed by: COLON & RECTAL SURGERY

## 2021-08-30 PROCEDURE — 74300 X-RAY BILE DUCTS/PANCREAS: CPT

## 2021-08-30 PROCEDURE — 6370000000 HC RX 637 (ALT 250 FOR IP)

## 2021-08-30 PROCEDURE — 6370000000 HC RX 637 (ALT 250 FOR IP): Performed by: STUDENT IN AN ORGANIZED HEALTH CARE EDUCATION/TRAINING PROGRAM

## 2021-08-30 PROCEDURE — C1758 CATHETER, URETERAL: HCPCS | Performed by: COLON & RECTAL SURGERY

## 2021-08-30 RX ORDER — MEPERIDINE HYDROCHLORIDE 25 MG/ML
12.5 INJECTION INTRAMUSCULAR; INTRAVENOUS; SUBCUTANEOUS EVERY 5 MIN PRN
Status: DISCONTINUED | OUTPATIENT
Start: 2021-08-30 | End: 2021-08-30 | Stop reason: HOSPADM

## 2021-08-30 RX ORDER — FENTANYL CITRATE 50 UG/ML
INJECTION, SOLUTION INTRAMUSCULAR; INTRAVENOUS PRN
Status: DISCONTINUED | OUTPATIENT
Start: 2021-08-30 | End: 2021-08-30 | Stop reason: SDUPTHER

## 2021-08-30 RX ORDER — SODIUM CHLORIDE, SODIUM LACTATE, POTASSIUM CHLORIDE, CALCIUM CHLORIDE 600; 310; 30; 20 MG/100ML; MG/100ML; MG/100ML; MG/100ML
INJECTION, SOLUTION INTRAVENOUS CONTINUOUS
Status: DISCONTINUED | OUTPATIENT
Start: 2021-08-30 | End: 2021-08-30 | Stop reason: HOSPADM

## 2021-08-30 RX ORDER — PROPOFOL 10 MG/ML
INJECTION, EMULSION INTRAVENOUS PRN
Status: DISCONTINUED | OUTPATIENT
Start: 2021-08-30 | End: 2021-08-30 | Stop reason: SDUPTHER

## 2021-08-30 RX ORDER — ROPIVACAINE HYDROCHLORIDE 5 MG/ML
INJECTION, SOLUTION EPIDURAL; INFILTRATION; PERINEURAL
Status: COMPLETED | OUTPATIENT
Start: 2021-08-30 | End: 2021-08-30

## 2021-08-30 RX ORDER — ONDANSETRON 2 MG/ML
INJECTION INTRAMUSCULAR; INTRAVENOUS PRN
Status: DISCONTINUED | OUTPATIENT
Start: 2021-08-30 | End: 2021-08-30 | Stop reason: SDUPTHER

## 2021-08-30 RX ORDER — SODIUM CHLORIDE 9 MG/ML
25 INJECTION, SOLUTION INTRAVENOUS PRN
Status: DISCONTINUED | OUTPATIENT
Start: 2021-08-30 | End: 2021-08-30 | Stop reason: HOSPADM

## 2021-08-30 RX ORDER — LIDOCAINE HYDROCHLORIDE 10 MG/ML
1 INJECTION, SOLUTION EPIDURAL; INFILTRATION; INTRACAUDAL; PERINEURAL
Status: DISCONTINUED | OUTPATIENT
Start: 2021-08-30 | End: 2021-08-30 | Stop reason: HOSPADM

## 2021-08-30 RX ORDER — LIDOCAINE HYDROCHLORIDE 20 MG/ML
INJECTION, SOLUTION INTRAVENOUS PRN
Status: DISCONTINUED | OUTPATIENT
Start: 2021-08-30 | End: 2021-08-30 | Stop reason: SDUPTHER

## 2021-08-30 RX ORDER — OXYCODONE HYDROCHLORIDE AND ACETAMINOPHEN 5; 325 MG/1; MG/1
1 TABLET ORAL EVERY 6 HOURS PRN
Qty: 12 TABLET | Refills: 0 | Status: SHIPPED | OUTPATIENT
Start: 2021-08-30 | End: 2021-09-02

## 2021-08-30 RX ORDER — ONDANSETRON 2 MG/ML
4 INJECTION INTRAMUSCULAR; INTRAVENOUS
Status: DISCONTINUED | OUTPATIENT
Start: 2021-08-30 | End: 2021-08-30 | Stop reason: HOSPADM

## 2021-08-30 RX ORDER — ROCURONIUM BROMIDE 10 MG/ML
INJECTION, SOLUTION INTRAVENOUS PRN
Status: DISCONTINUED | OUTPATIENT
Start: 2021-08-30 | End: 2021-08-30 | Stop reason: SDUPTHER

## 2021-08-30 RX ORDER — MAGNESIUM HYDROXIDE 1200 MG/15ML
LIQUID ORAL CONTINUOUS PRN
Status: COMPLETED | OUTPATIENT
Start: 2021-08-30 | End: 2021-08-30

## 2021-08-30 RX ORDER — WOUND DRESSING ADHESIVE - LIQUID
LIQUID MISCELLANEOUS PRN
Status: DISCONTINUED | OUTPATIENT
Start: 2021-08-30 | End: 2021-08-30 | Stop reason: ALTCHOICE

## 2021-08-30 RX ORDER — OXYCODONE HYDROCHLORIDE AND ACETAMINOPHEN 5; 325 MG/1; MG/1
1 TABLET ORAL
Status: COMPLETED | OUTPATIENT
Start: 2021-08-30 | End: 2021-08-30

## 2021-08-30 RX ORDER — DEXAMETHASONE SODIUM PHOSPHATE 4 MG/ML
INJECTION, SOLUTION INTRA-ARTICULAR; INTRALESIONAL; INTRAMUSCULAR; INTRAVENOUS; SOFT TISSUE PRN
Status: DISCONTINUED | OUTPATIENT
Start: 2021-08-30 | End: 2021-08-30 | Stop reason: SDUPTHER

## 2021-08-30 RX ORDER — FENTANYL CITRATE 50 UG/ML
50 INJECTION, SOLUTION INTRAMUSCULAR; INTRAVENOUS EVERY 10 MIN PRN
Status: DISCONTINUED | OUTPATIENT
Start: 2021-08-30 | End: 2021-08-30 | Stop reason: HOSPADM

## 2021-08-30 RX ORDER — DIPHENHYDRAMINE HYDROCHLORIDE 50 MG/ML
12.5 INJECTION INTRAMUSCULAR; INTRAVENOUS
Status: DISCONTINUED | OUTPATIENT
Start: 2021-08-30 | End: 2021-08-30 | Stop reason: HOSPADM

## 2021-08-30 RX ORDER — MAGNESIUM HYDROXIDE 1200 MG/15ML
LIQUID ORAL PRN
Status: DISCONTINUED | OUTPATIENT
Start: 2021-08-30 | End: 2021-08-30 | Stop reason: ALTCHOICE

## 2021-08-30 RX ORDER — SODIUM CHLORIDE 0.9 % (FLUSH) 0.9 %
10 SYRINGE (ML) INJECTION PRN
Status: DISCONTINUED | OUTPATIENT
Start: 2021-08-30 | End: 2021-08-30 | Stop reason: HOSPADM

## 2021-08-30 RX ORDER — SODIUM CHLORIDE 0.9 % (FLUSH) 0.9 %
10 SYRINGE (ML) INJECTION EVERY 12 HOURS SCHEDULED
Status: DISCONTINUED | OUTPATIENT
Start: 2021-08-30 | End: 2021-08-30 | Stop reason: HOSPADM

## 2021-08-30 RX ORDER — METOCLOPRAMIDE HYDROCHLORIDE 5 MG/ML
10 INJECTION INTRAMUSCULAR; INTRAVENOUS
Status: DISCONTINUED | OUTPATIENT
Start: 2021-08-30 | End: 2021-08-30 | Stop reason: HOSPADM

## 2021-08-30 RX ORDER — LIDOCAINE HYDROCHLORIDE AND EPINEPHRINE 10; 10 MG/ML; UG/ML
INJECTION, SOLUTION INFILTRATION; PERINEURAL PRN
Status: DISCONTINUED | OUTPATIENT
Start: 2021-08-30 | End: 2021-08-30 | Stop reason: SDUPTHER

## 2021-08-30 RX ADMIN — FENTANYL CITRATE 50 MCG: 50 INJECTION, SOLUTION INTRAMUSCULAR; INTRAVENOUS at 10:33

## 2021-08-30 RX ADMIN — FENTANYL CITRATE 50 MCG: 50 INJECTION, SOLUTION INTRAMUSCULAR; INTRAVENOUS at 08:10

## 2021-08-30 RX ADMIN — ROPIVACAINE HYDROCHLORIDE 30 ML: 5 INJECTION, SOLUTION EPIDURAL; INFILTRATION; PERINEURAL at 08:25

## 2021-08-30 RX ADMIN — ROCURONIUM BROMIDE 40 MG: 10 INJECTION INTRAVENOUS at 08:10

## 2021-08-30 RX ADMIN — FENTANYL CITRATE 50 MCG: 50 INJECTION, SOLUTION INTRAMUSCULAR; INTRAVENOUS at 08:53

## 2021-08-30 RX ADMIN — LIDOCAINE HYDROCHLORIDE AND EPINEPHRINE 10 ML: 10; 10 INJECTION, SOLUTION INFILTRATION; PERINEURAL at 08:26

## 2021-08-30 RX ADMIN — CEFAZOLIN 2 G: 10 INJECTION, POWDER, FOR SOLUTION INTRAVENOUS at 08:06

## 2021-08-30 RX ADMIN — SODIUM CHLORIDE, POTASSIUM CHLORIDE, SODIUM LACTATE AND CALCIUM CHLORIDE: 600; 310; 30; 20 INJECTION, SOLUTION INTRAVENOUS at 07:39

## 2021-08-30 RX ADMIN — LIDOCAINE HYDROCHLORIDE 50 MG: 20 INJECTION, SOLUTION INTRAVENOUS at 08:10

## 2021-08-30 RX ADMIN — OXYCODONE HYDROCHLORIDE AND ACETAMINOPHEN 1 TABLET: 5; 325 TABLET ORAL at 11:30

## 2021-08-30 RX ADMIN — PROPOFOL 150 MG: 10 INJECTION, EMULSION INTRAVENOUS at 08:10

## 2021-08-30 RX ADMIN — ROCURONIUM BROMIDE 10 MG: 10 INJECTION INTRAVENOUS at 08:54

## 2021-08-30 RX ADMIN — SUGAMMADEX 100 MG: 100 INJECTION, SOLUTION INTRAVENOUS at 10:00

## 2021-08-30 RX ADMIN — DEXAMETHASONE SODIUM PHOSPHATE 4 MG: 4 INJECTION, SOLUTION INTRAMUSCULAR; INTRAVENOUS at 08:20

## 2021-08-30 RX ADMIN — FENTANYL CITRATE 50 MCG: 50 INJECTION, SOLUTION INTRAMUSCULAR; INTRAVENOUS at 10:43

## 2021-08-30 RX ADMIN — ONDANSETRON 4 MG: 2 INJECTION INTRAMUSCULAR; INTRAVENOUS at 08:20

## 2021-08-30 ASSESSMENT — PULMONARY FUNCTION TESTS
PIF_VALUE: 26
PIF_VALUE: 27
PIF_VALUE: 26
PIF_VALUE: 28
PIF_VALUE: 21
PIF_VALUE: 24
PIF_VALUE: 10
PIF_VALUE: 26
PIF_VALUE: 23
PIF_VALUE: 26
PIF_VALUE: 26
PIF_VALUE: 1
PIF_VALUE: 26
PIF_VALUE: 1
PIF_VALUE: 27
PIF_VALUE: 27
PIF_VALUE: 24
PIF_VALUE: 25
PIF_VALUE: 26
PIF_VALUE: 23
PIF_VALUE: 27
PIF_VALUE: 1
PIF_VALUE: 26
PIF_VALUE: 2
PIF_VALUE: 24
PIF_VALUE: 26
PIF_VALUE: 30
PIF_VALUE: 29
PIF_VALUE: 18
PIF_VALUE: 26
PIF_VALUE: 18
PIF_VALUE: 1
PIF_VALUE: 1
PIF_VALUE: 26
PIF_VALUE: 2
PIF_VALUE: 2
PIF_VALUE: 26
PIF_VALUE: 26
PIF_VALUE: 24
PIF_VALUE: 26
PIF_VALUE: 20
PIF_VALUE: 26
PIF_VALUE: 19
PIF_VALUE: 26
PIF_VALUE: 27
PIF_VALUE: 2
PIF_VALUE: 1
PIF_VALUE: 27
PIF_VALUE: 26
PIF_VALUE: 30
PIF_VALUE: 24
PIF_VALUE: 27
PIF_VALUE: 19
PIF_VALUE: 26
PIF_VALUE: 26
PIF_VALUE: 18
PIF_VALUE: 26
PIF_VALUE: 28
PIF_VALUE: 26
PIF_VALUE: 26
PIF_VALUE: 27
PIF_VALUE: 1
PIF_VALUE: 26
PIF_VALUE: 26
PIF_VALUE: 15
PIF_VALUE: 23
PIF_VALUE: 1
PIF_VALUE: 20
PIF_VALUE: 27
PIF_VALUE: 26
PIF_VALUE: 27
PIF_VALUE: 18
PIF_VALUE: 26
PIF_VALUE: 18
PIF_VALUE: 23
PIF_VALUE: 26
PIF_VALUE: 12
PIF_VALUE: 25
PIF_VALUE: 26
PIF_VALUE: 26
PIF_VALUE: 23
PIF_VALUE: 10
PIF_VALUE: 27
PIF_VALUE: 2
PIF_VALUE: 27
PIF_VALUE: 10
PIF_VALUE: 26
PIF_VALUE: 26
PIF_VALUE: 19
PIF_VALUE: 10
PIF_VALUE: 27
PIF_VALUE: 24
PIF_VALUE: 29
PIF_VALUE: 24
PIF_VALUE: 27
PIF_VALUE: 26
PIF_VALUE: 18
PIF_VALUE: 26
PIF_VALUE: 19
PIF_VALUE: 26
PIF_VALUE: 32
PIF_VALUE: 27
PIF_VALUE: 24
PIF_VALUE: 26
PIF_VALUE: 26
PIF_VALUE: 22
PIF_VALUE: 18
PIF_VALUE: 24
PIF_VALUE: 3
PIF_VALUE: 26
PIF_VALUE: 18

## 2021-08-30 ASSESSMENT — PAIN SCALES - GENERAL
PAINLEVEL_OUTOF10: 8
PAINLEVEL_OUTOF10: 7
PAINLEVEL_OUTOF10: 8

## 2021-08-30 ASSESSMENT — PAIN DESCRIPTION - ORIENTATION
ORIENTATION: UPPER
ORIENTATION: RIGHT

## 2021-08-30 ASSESSMENT — PAIN DESCRIPTION - LOCATION
LOCATION: ABDOMEN

## 2021-08-30 ASSESSMENT — PAIN DESCRIPTION - PAIN TYPE: TYPE: SURGICAL PAIN

## 2021-08-30 NOTE — ANESTHESIA POSTPROCEDURE EVALUATION
Department of Anesthesiology  Postprocedure Note    Patient: Celi Chou  MRN: [id number]  YOB: 1940  Date of evaluation: 8/30/2021  Time:  10:19 AM     Procedure Summary     Date: 08/30/21 Room / Location: [address]    Anesthesia Start: 3806 Anesthesia Stop:     Procedure: [address] CHOLANGIOGRAMS; COMMON BILE DUCT EXPLORATION (N/A Abdomen) Diagnosis: (CHOLELITHIASIS)    Surgeons: Sydnee Elaine MD Responsible Provider: Shannan Eid DO    Anesthesia Type: general, regional ASA Status: 2          Anesthesia Type: No value filed. Briana Phase I: Briana Score: 10    Briana Phase II:      Last vitals: Reviewed and per EMR flowsheets.        Anesthesia Post Evaluation    Patient location during evaluation: PACU  Patient participation: complete - patient participated  Level of consciousness: awake  Pain score: 0  Airway patency: patent  Nausea & Vomiting: no nausea  Complications: no  Cardiovascular status: hemodynamically stable  Respiratory status: acceptable  Hydration status: stable

## 2021-08-30 NOTE — ANESTHESIA PROCEDURE NOTES
Peripheral Block    Patient location during procedure: OR  Start time: 8/30/2021 8:19 AM  End time: 8/30/2021 8:25 AM  Staffing  Performed: anesthesiologist   Anesthesiologist: Melyssa Snider,   Preanesthetic Checklist  Completed: patient identified, IV checked, site marked, risks and benefits discussed, surgical consent, monitors and equipment checked, pre-op evaluation, timeout performed, anesthesia consent given, oxygen available and patient being monitored  Peripheral Block  Patient position: supine  Prep: ChloraPrep  Patient monitoring: cardiac monitor, continuous pulse ox, frequent blood pressure checks and IV access  Block type: TAP  Laterality: bilateral  Injection technique: single-shot  Guidance: ultrasound guided  Local infiltration: ropivacaine  Infiltration strength: 0.5 %  Dose: 30 mL  Provider prep: mask and sterile gloves (Sterile probe cover)  Local infiltration: ropivacaine  Needle  Needle type: combined needle/nerve stimulator   Needle gauge: 22 G  Needle length: 10 cm  Needle localization: anatomical landmarks and ultrasound guidance  Assessment  Injection assessment: negative aspiration for heme, no paresthesia on injection and local visualized surrounding nerve on ultrasound  Paresthesia pain: immediately resolved  Slow fractionated injection: yes  Hemodynamics: stable  Additional Notes  Ultrasound image printed and saved in patient chart.     Sterile probe cover used    Ropivacaine 0.5% 30 ml + lidocaine 1% with epi 1:100k 10 ml    20 ml bilateral  Medications Administered  Ropivacaine (NAROPIN) injection 0.5%, 30 mL  Reason for block: post-op pain management and at surgeon's request

## 2021-08-30 NOTE — OP NOTE
one distal clip were placed before cutting. The cystic duct was markedly enlarged. A distal clip was placed on the  cystic duct at the gallbladder junction. A hole was made in the cystic  duct and multiple small stones and sludge came up from the cystic duct. I attempted to place a cholangiogram catheter three times, but it would  not pass to the cystic duct which was large and full of debris. An additional 5-mm port was placed in the right upper quadrant and a  choledochoscope was placed into the cystic duct. Using irrigation, I  was able to irrigate all the sludge and enter the common bile duct  eventually without problems. The cholangiogram catheter was then  removed. A cholangiogram catheter was inserted and then secured. Under real time  fluoroscopy, an intraoperative cholangiogram showed no filling defects  and normal biliary anatomy. The pancreatic duct was visualized as well. The cholangiogram catheter was removed and two secure clips were placed  across the proximal cystic duct and the cystic duct was cut with  scissors. The gallbladder was removed from the liver bed using electrocautery,  placed in an EndoCatch bag, and brought out the subxiphoid port site. It was sent to Pathology in formalin for permanent section. At this time, irrigation was performed copiously for the sludge and  small stones that came from the cystic duct. Excellent hemostasis was  assured in the gallbladder bed with electrocautery and Surgicel. The remainder of the laparoscopic exam was otherwise unremarkable. Prior to closure, lap, needle, instrument counts and Ray-Tecs were all  correct. Pneumoperitoneum was released and the ports were all removed. The fascia of the subxiphoid port site was closed with an interrupted 0  Vicryl suture. The skin was closed with 4-0 Monocryl in a subcuticular  fashion. Steri-Strips, Mastisol, and Band-Aids were placed as dressing.     After closure, lap, needle, and instrument counts were all correct  including Ray-Tecs. The patient was extubated and taken to recovery room for postop  monitoring.         Michelle Howard MD    D: 08/30/2021 10:23:40       T: 08/30/2021 10:26:02     [id number]  Job#: [id number]     Doc#: [id number]    CC:

## 2021-08-30 NOTE — ANESTHESIA PRE PROCEDURE
Department of Anesthesiology  Preprocedure Note       Name:  Celi Chou   Age:  80 y.o.  :  1940                                          MRN:  [id number]         Date:  2021      Surgeon: Gaby Bell):  Sydnee Elaine MD    Procedure: Procedure(s):  LAPAROSCOPIC CHOLECYSTECTOMY INTRAOPERATIVE CHOLANGIOGRAMS    Medications prior to admission:   Prior to Admission medications    Medication Sig Start Date End Date Taking? Authorizing Provider   Gabapentin, Once-Daily, (GRALISE) 600 MG TABS Take by mouth. Yes Historical Provider, MD   guaiFENesin (MUCINEX) 600 MG extended release tablet    Yes Historical Provider, MD   loperamide (IMODIUM A-D) 2 MG tablet Take 2 mg by mouth as needed 10/17/20  Yes Historical Provider, MD   fentaNYL (DURAGESIC) 50 MCG/HR APPLY 1 PATCH TO SKIN EVERY 72 HOURS. 8/10/21  Yes Historical Provider, MD   calcium carbonate-vitamin D (CALCIUM 600+D) 600-200 MG-UNIT TABS     Historical Provider, MD   Multiple Vitamins-Minerals (THERAPEUTIC MULTIVITAMIN-MINERALS) tablet Take 1 tablet by mouth daily    Historical Provider, MD   lovastatin (MEVACOR) 20 MG tablet 40 mg  21   Historical Provider, MD   vitamin D (CHOLECALCIFEROL) 25 MCG (1000 UT) TABS tablet Take by mouth    Historical Provider, MD       Current medications:    Current Facility-Administered Medications   Medication Dose Route Frequency Provider Last Rate Last Admin    ceFAZolin (ANCEF) 2000 mg in dextrose 5 % 100 mL IVPB  2,000 mg IntraVENous Once Sydnee Elaine MD        lactated ringers infusion   IntraVENous Continuous Sydnee Elaine MD           Allergies: Allergies   Allergen Reactions    Codeine Shortness Of Breath       Problem List:  There is no problem list on file for this patient.       Past Medical History:        Diagnosis Date    Cancer of the skin, basal cell     right ear    Hyperlipidemia        Past Surgical History:        Procedure Laterality Date    APPENDECTOMY      BACK SURGERY      HYSTERECTOMY      JOINT REPLACEMENT Right     knee    JOINT REPLACEMENT Right     hip    JOINT REPLACEMENT Left     knee       Social History:    Social History     Tobacco Use    Smoking status: Never Smoker    Smokeless tobacco: Never Used   Substance Use Topics    Alcohol use: Never                                Counseling given: Not Answered      Vital Signs (Current):   Vitals:    08/30/21 0648   BP: 135/63   Pulse: 93   Resp: 18   Temp: 98.3 °F (36.8 °C)   TempSrc: Temporal   SpO2: 97%   Weight: 190 lb (86.2 kg)   Height: 5' 1\" (1.549 m)                                              BP Readings from Last 3 Encounters:   08/30/21 135/63   08/23/21 123/68   08/09/21 95/82       NPO Status: Time of last liquid consumption: 0000                        Time of last solid consumption: 0000                        Date of last liquid consumption: 08/29/21                        Date of last solid food consumption: 08/29/21    BMI:   Wt Readings from Last 3 Encounters:   08/30/21 190 lb (86.2 kg)   08/23/21 214 lb 3.2 oz (97.2 kg)   08/17/21 185 lb (83.9 kg)     Body mass index is 35.9 kg/m². CBC:   Lab Results   Component Value Date    WBC 7.7 08/09/2021    RBC 4.80 08/09/2021    HGB 14.5 08/09/2021    HCT 44.0 08/09/2021    MCV 91.7 08/09/2021    RDW 13.7 08/09/2021     08/09/2021       CMP:   Lab Results   Component Value Date     08/09/2021    K 5.2 08/09/2021     08/09/2021    CO2 26 08/09/2021    BUN 19 08/09/2021    CREATININE 1.0 08/09/2021    CREATININE 0.87 08/09/2021    GFRAA >60 08/09/2021    LABGLOM 53 08/09/2021    GLUCOSE 101 08/09/2021    PROT 7.5 08/09/2021    CALCIUM 9.6 08/09/2021    BILITOT 0.5 08/09/2021    ALKPHOS 74 08/09/2021    AST 37 08/09/2021    ALT 24 08/09/2021       POC Tests: No results for input(s): POCGLU, POCNA, POCK, POCCL, POCBUN, POCHEMO, POCHCT in the last 72 hours.     Coags:   Lab Results   Component Value Date    PROTIME 11.2 12/05/2013    INR 1.1 12/05/2013    APTT 23.8 12/05/2013       HCG (If Applicable): No results found for: PREGTESTUR, PREGSERUM, HCG, HCGQUANT     ABGs: No results found for: PHART, PO2ART, LFA6WEV, MZV9ADN, BEART, J0VOWDHI     Type & Screen (If Applicable):  No results found for: LABABO, LABRH    Drug/Infectious Status (If Applicable):  No results found for: HIV, HEPCAB    COVID-19 Screening (If Applicable): No results found for: COVID19        Anesthesia Evaluation  Patient summary reviewed and Nursing notes reviewed no history of anesthetic complications:   Airway: Mallampati: II  TM distance: >3 FB   Neck ROM: full  Mouth opening: > = 3 FB Dental: normal exam         Pulmonary:Negative Pulmonary ROS and normal exam                               Cardiovascular:Negative CV ROS  Exercise tolerance: good (>4 METS),   (+) hyperlipidemia      ECG reviewed               Beta Blocker:  Not on Beta Blocker      ROS comment: Normal sinus rhythm with sinus arrhythmia  Minimal voltage criteria for LVH, may be normal variant  Borderline ECG  No previous ECGs available     Neuro/Psych:   Negative Neuro/Psych ROS              GI/Hepatic/Renal:   (+) morbid obesity         ROS comment: BMI 36.   Endo/Other: Negative Endo/Other ROS             Pt had PAT visit. Abdominal:             Vascular: negative vascular ROS. Other Findings:             Anesthesia Plan      general and regional     ASA 2     (ETT  TAP)  Induction: intravenous. MIPS: Postoperative opioids intended and Prophylactic antiemetics administered. Anesthetic plan and risks discussed with patient. Plan discussed with CRNA.     Attending anesthesiologist reviewed and agrees with Jun Montana DO   8/30/2021

## 2021-09-10 ENCOUNTER — OFFICE VISIT (OUTPATIENT)
Dept: SURGERY | Age: 81
End: 2021-09-10

## 2021-09-10 VITALS
TEMPERATURE: 96.1 F | WEIGHT: 190 LBS | OXYGEN SATURATION: 97 % | HEART RATE: 94 BPM | BODY MASS INDEX: 35.87 KG/M2 | HEIGHT: 61 IN

## 2021-09-10 DIAGNOSIS — K80.10 CALCULUS OF GALLBLADDER WITH CHRONIC CHOLECYSTITIS WITHOUT OBSTRUCTION: Primary | ICD-10-CM

## 2021-09-10 PROCEDURE — 99024 POSTOP FOLLOW-UP VISIT: CPT | Performed by: COLON & RECTAL SURGERY

## 2021-09-10 NOTE — PROGRESS NOTES
Subjective:      Patient ID: Ravi Mccloud is a 80 y.o. female who presents for:  Chief Complaint   Patient presents with    Post-Op Check       She returns to the office about 11 days out from a laparoscopic cholecystectomy and cholangiogram.    Final histology showed chronic cholecystitis with gallstones. She is eating well. Her pain is minimal.  She denies nausea or vomiting. Past Medical History:   Diagnosis Date    Cancer of the skin, basal cell     right ear    Hyperlipidemia      Past Surgical History:   Procedure Laterality Date    APPENDECTOMY      BACK SURGERY      CHOLECYSTECTOMY, LAPAROSCOPIC N/A 8/30/2021    LAPAROSCOPIC CHOLECYSTECTOMY INTRAOPERATIVE CHOLANGIOGRAMS; COMMON BILE DUCT EXPLORATION performed by Cary Thomas MD at [address] [place] [address] Right     knee    JOINT REPLACEMENT Right     hip    JOINT REPLACEMENT Left     knee     Social History     Socioeconomic History    Marital status:      Spouse name: Not on file    Number of children: Not on file    Years of education: Not on file    Highest education level: Not on file   Occupational History    Not on file   Tobacco Use    Smoking status: Never Smoker    Smokeless tobacco: Never Used   Substance and Sexual Activity    Alcohol use: Never    Drug use: Never    Sexual activity: Not on file   Other Topics Concern    Not on file   Social History Narrative    Not on file     Social Determinants of Health     Financial Resource Strain:     Difficulty of Paying Living Expenses:    Food Insecurity:     Worried About Running Out of Food in the Last Year:     [address] in the Last Year:    Transportation Needs:     Lack of Transportation (Medical):      Lack of Transportation (Non-Medical):    Physical Activity:     Days of Exercise per Week:     Minutes of Exercise per Session:    Stress:     Feeling of Stress :    Social Connections:     Frequency of Communication with Friends and Family:     Frequency of Social Gatherings with Friends and Family:     Attends [employer] Services:     Active Member of Clubs or Organizations:     Attends Club or Organization Meetings:     Marital Status:    Intimate Partner Violence:     Fear of Current or Ex-Partner:     Emotionally Abused:     Physically Abused:     Sexually Abused:      No family history on file. Allergies:  Codeine    Review of Systems    Objective:    Pulse 94   Temp 96.1 °F (35.6 °C) (Temporal)   Ht 5' 1\" (1.549 m)   Wt 190 lb (86.2 kg)   SpO2 97%   BMI 35.90 kg/m²     Physical Exam  On exam the incisions are healing well. Steri-Strips removed. Abdomen soft nontender. Sclera anicteric       Assessment/Plan:          Diagnosis Orders   1. Calculus of gallbladder with chronic cholecystitis without obstruction       Histology reviewed    Cholangiogram reviewed    Wound care and activity instructions were given. She will return back to see me in the office as needed. Please note this report has beenpartially produced using speech recognition software and may cause contain errors related to that system including grammar, punctuation and spelling as well as words and phrases that may seem inappropriate.  If there arequestions or concerns please feel free to contact me to clarify

## 2022-03-18 ENCOUNTER — HOSPITAL ENCOUNTER (OUTPATIENT)
Dept: PHYSICAL THERAPY | Age: 82
Setting detail: THERAPIES SERIES
Discharge: HOME | End: 2022-03-18
Payer: MEDICARE

## 2022-03-18 PROCEDURE — 97162 PT EVAL MOD COMPLEX 30 MIN: CPT

## 2022-03-18 PROCEDURE — 97110 THERAPEUTIC EXERCISES: CPT

## 2022-03-18 ASSESSMENT — PAIN DESCRIPTION - PAIN TYPE: TYPE: CHRONIC PAIN

## 2022-03-18 ASSESSMENT — PAIN SCALES - GENERAL: PAINLEVEL_OUTOF10: 7

## 2022-03-18 ASSESSMENT — PAIN DESCRIPTION - LOCATION: LOCATION: ANKLE

## 2022-03-18 ASSESSMENT — PAIN DESCRIPTION - DESCRIPTORS: DESCRIPTORS: SHARP

## 2022-03-18 ASSESSMENT — PAIN DESCRIPTION - ORIENTATION: ORIENTATION: LEFT

## 2022-03-18 NOTE — PROGRESS NOTES
[address]  PHYSICAL THERAPY EVALUATION    Date: 3/18/2022  Patient Name: Elton Clements       MRN: [id number]   Account: [de-identified]   : 1940  ([de-identified] y.o.)   Gender: female   Referring Practitioner: Raad Howard DPM                 Diagnosis: Chronic pain of left ankle; Achilles tendinitis of left lower extremity  Treatment Diagnosis: left ankle pain, decreased left ankle ROM, decreased left ankle strength  Additional Pertinent Hx: bilateral knee replacements, 3 back surgeries, right hip replacement, OA             Past Medical History:  has a past medical history of Cancer of the skin, basal cell and Hyperlipidemia. Past Surgical History:   has a past surgical history that includes Hysterectomy; Appendectomy; back surgery; joint replacement (Right); joint replacement (Right); joint replacement (Left); and Cholecystectomy, laparoscopic (N/A, 2021). Vital Signs  Patient Currently in Pain: Yes   Pain Screening  Patient Currently in Pain: Yes  Pain Assessment  Pain Assessment: 0-10  Pain Level: 7  Pain Type: Chronic pain  Pain Location: Ankle (Achilles)  Pain Orientation: Left  Pain Descriptors: Sharp                Lives With: Spouse  Type of Home: House  Home Layout: Two level  Home Access: Stairs to enter with rails  Entrance Stairs - Number of Steps: 1  ADL Assistance: Independent  Ambulation Assistance: Independent (without AD)  Transfer Assistance: Independent  Occupation: Retired  Type of occupation: retired teacher        Subjective:  Subjective: Patient reports left ankle pain on the heel and achilles for the last 6 months with insidious onset. Increased pain with pressure and WB. Decreased pain with flexion and extension. X-ray of ankle was negative. Reports some numbness in left heel.        Objective:   Sensation  Overall Sensation Status: Impaired  Additional Comments: reports some numbness on heel              Ambulation 1  Surface: carpet  Device: No Device  Assistance: Modified Independent  Quality of Gait: lateral sway  Gait Deviations: Slow Pily  Distance: clinical distance in department        Transfers  Sit to Stand: Modified independent  Stand to sit: Modified independent  Comment: uses UEs    Strength RLE  R Hip Flexion: 4+/5  R Knee Flexion: 4+/5  R Knee Extension: 5/5  R Ankle Dorsiflexion: 5/5  R Ankle Plantar flexion: 5/5  R Ankle Inversion: 5/5  R Ankle Eversion: 5/5  Strength LLE  L Hip Flexion: 4+/5  L Knee Flexion: 4+/5  L Knee Extension: 5/5  L Ankle Dorsiflexion: 4+/5  L Ankle Plantar Flexion: 4+/5  L Ankle Inversion: 4+/5  L Ankle Eversion: 4+/5                      AROM LLE (degrees)  L Ankle Dorsiflexion 0-20: 5 deg  L Ankle Plantar Flexion 0-45: 45 deg  L Ankle Forefoot Inversion 0-40: 35 deg  L Ankle Forefoot Eversion 0-20: 10 deg                     Observation/Palpation  Posture: Fair (rounded shoulders)  Palpation: tenderness to palpation along left achilles (mid portion with slight edema)  Bed mobility  Supine to Sit: Modified independent  Sit to Supine: Modified independent          Additional Measures  Other: LEFS: 11/80         Exercises:   Exercises  Exercise 1: gastroc stretch seated with strap 30s x 3, left  Exercise 2: soleus stretch seated with strap 30s x 3, left  Exercise 3: US to left achilles (see modalities)  Exercise 4: ankle circles*  Exercise 5: standing gastroc stretch*  Exercise 6: ankle Tband*  Exercise 20: HEP: seated gastroc stretch, seated soleuc stretch  Modalities:  Modalities  Ultrasound: US on left Achilles in seated postion 1.2W/cm2, 3MHz, continous, 4 minutes  Manual:  Manual therapy  Soft Tissue Mobalization: Using \"the stick\" STM on gastroc; total manual 4 minutes  Other: consider KT tape  *Indicates exercise,modality, or manual techniques to be initiated when appropriate  Assessment:   Body structures, Functions, Activity limitations: Decreased ROM,Decreased strength,Increased pain  Assessment: Patient reports left Achilles pain with movement and pressure. Upon PT evaluation, patient demonstrates decreased left ankle DF with impaired strength. Further PT recommended to improve ROM, strength, and decrease pain for overall quality of life. Prognosis: Good  Discharge Recommendations: Continue to assess pending progress        Decision Making: Medium Complexity  History: bilateral knee replacements, back surgeries x 3, right hip replacement  Exam: left ankle pain, decreased left ankle ROM, decreased left ankle strength  Clinical Presentation: evolving        Plan  Frequency/Duration:  Plan  Times per week: 2  Plan weeks: 4  Current Treatment Recommendations: Kin Mike Re-education,Manual Therapy - Soft Tissue Mobilization,Manual Therapy - Joint Manipulation,Home Exercise Program,Patient/Caregiver Education & Training,Modalities         Patient Education  New Education Provided: PT Education: Goals;PT Role;Plan of Care;Home Exercise Program    POST-PAIN     Pain Rating (0-10 pain scale):   unrated post treatment/10  Location and pain description same as pre-treatment unless indicated. Action: [] NA  [] Call Physician  [x] Perform HEP  [x] Meds as prescribed    Evaluation and patient rights have been reviewed and patient agrees with plan of care. Yes  [x]  No  []   Explain:       Castro Fall Risk Assessment  Risk Factor Scale  Score   History of Falls [] Yes  [x] No 25  0 0   Secondary Diagnosis [] Yes  [x] No 15  0 0   Ambulatory Aid [] Furniture  [] Crutches/cane/walker  [] None/bedrest/wheelchair/nurse 30  15  0 0   IV/Heparin Lock [] Yes  [x] No 20  0 0   Gait/Transferring [] Impaired  [x] Weak  [] Normal/bedrest/immobile 20  10  0 10   Mental Status [] Forgets limitations  [x] Oriented to own ability 15  0 0      Total:10     Based on the Assessment score: check the appropriate box.   [x]  No intervention needed   Low =   Score of 0-24  []  Use standard prevention interventions Moderate =  Score of 24-44   [] Discuss fall prevention strategies   [] Indicate moderate falls risk on eval  []  Use high risk prevention interventions High = Score of 45 and higher   [] Discuss fall prevention strategies   [] Provide supervision during treatment time    Goals  Long term goals  Time Frame for Long term goals : 4 weeks  Long term goal 1: Patient will report </= 1/10 pain in left Achilles with all movements. Long term goal 2: Patient will increase left DF ROM >/= 20 degrees for improved functional tolerance. Long term goal 3: Patient will increase left ankle strength to 5/5 for improved ambulation tolerance. Long term goal 4: LEFS >/= 25/80 to demonstrate functional improvements. Long term goal 5: Patient will be independent with HEP.          PT Individual Minutes  Time In: 1010  Time Out: 1100  Minutes: 50  Timed Code Treatment Minutes: 15 Minutes  Procedure Minutes: 35 PT evaluation minutes     Timed Activity Minutes Units   Ther Ex 7 1   Manual  4    Ultrasound 4        Electronically signed by lBake Brown PT on 3/18/22 at 5:08 PM EDT

## 2022-03-18 NOTE — PLAN OF CARE
[address]     Ph: [phone]  Fax: [phone]    [x] Certification  [] Recertification [x]  Plan of Care  [] Progress Note [] Discharge      To:  Maryam Butler DPM      From:  Caroline Ndiaye, SANTANA  Patient: Madelyn Fabian     : 1940  Diagnosis: Chronic pain of left ankle; Achilles tendinitis of left lower extremity     Date: 3/18/2022  Treatment Diagnosis: left ankle pain, decreased left ankle ROM, decreased left ankle strength    Plan of Care/Certification Expiration Date: 22  Progress Report Period from:  3/18/2022  to 3/18/2022    Total # of Visits to Date: 1   No Show: 0    Canceled Appointment: 0     OBJECTIVE:   Short Term Goals =Long term goals    Long Term Goals - Time Frame for Long term goals : 4 weeks  Goals Current/ Discharge status Met   Long term goal 1: Patient will report </= 1/10 pain in left Achilles with all movements. Patient reports 7/10 pain in left Achilles. [] yes  [x] no   Long term goal 2: Patient will increase left DF ROM >/= 20 degrees for improved functional tolerance. AROM LLE (degrees)  L Ankle Dorsiflexion 0-20: 5 deg  L Ankle Plantar Flexion 0-45: 45 deg  L Ankle Forefoot Inversion 0-40: 35 deg  L Ankle Forefoot Eversion 0-20: 10 deg      [] yes  [x] no   Long term goal 3: Patient will increase left ankle strength to 5/5 for improved ambulation tolerance. Strength RLE  R Hip Flexion: 4+/5  R Knee Flexion: 4+/5  R Knee Extension: 5/5  R Ankle Dorsiflexion: 5/5  R Ankle Plantar flexion: 5/5  R Ankle Inversion: 5/5  R Ankle Eversion: 5/5  Strength LLE  L Hip Flexion: 4+/5  L Knee Flexion: 4+/5  L Knee Extension: 5/5  L Ankle Dorsiflexion: 4+/5  L Ankle Plantar Flexion: 4+/5  L Ankle Inversion: 4+/5  L Ankle Eversion: 4+/5            [] yes  [x] no   Long term goal 4: LEFS >/=  to demonstrate functional improvements.  LEFS:  [] yes  [x] no   Long term goal 5: Patient will be independent with HEP. Patient issued HEP [] yes  [x] no        Body structures, Functions, Activity limitations: Decreased ROM,Decreased strength,Increased pain  Assessment: Patient reports left Achilles pain with movement and pressure. Upon PT evaluation, patient demonstrates decreased left ankle DF with impaired strength. Further PT recommended to improve ROM, strength, and decrease pain for overall quality of life. Prognosis: Good  Discharge Recommendations: Continue to assess pending progress      PT Education: Goals;PT Role;Plan of Care;Home Exercise Program    PLAN: [x] Evaluate and Treat  Frequency/Duration:  Plan  Times per week: 2  Plan weeks: 4  Current Treatment Recommendations: Ke[place] All Re-education,Manual Therapy - Soft Tissue Mobilization,Manual Therapy - Joint Manipulation,Home Exercise Program,Patient/Caregiver Education & Training,Modalities     Precautions:                            Patient Status:[x] Continue/ Initiate plan of Care    [] Discharge PT. Recommend pt continue with HEP. [] Additional visits requested, Please re-certify for additional visits:          Signature: Electronically signed by Zeinab Gauthier PT on 3/18/22 at 5:14 PM EDT      If you have any questions or concerns, please don't hesitate to call. Thank you for your referral.    I have reviewed this plan of care and certify a need for medically necessary rehabilitation services.     Physician Signature:__________________________________________________________  Date:  Please sign and return

## 2022-03-30 ENCOUNTER — HOSPITAL ENCOUNTER (OUTPATIENT)
Dept: PHYSICAL THERAPY | Age: 82
Setting detail: THERAPIES SERIES
Discharge: HOME | End: 2022-03-30
Payer: MEDICARE

## 2022-03-30 PROCEDURE — 97110 THERAPEUTIC EXERCISES: CPT

## 2022-03-30 PROCEDURE — 97140 MANUAL THERAPY 1/> REGIONS: CPT

## 2022-03-30 NOTE — PROGRESS NOTES
[address] [address]  Outpatient Physical Therapy    Treatment Note        Date: 3/30/2022  Patient: Vicente Orellana  : 1940  ACCT #: [de-identified]  Referring Practitioner: Lolita Mauro DPM  Diagnosis: Chronic pain of left ankle; Achilles tendinitis of left lower extremity  Treatment Diagnosis: left ankle pain, decreased left ankle ROM, decreased left ankle strength    Visit Information:  PT Visit Information  PT Insurance Information: [employer] Medicare  Total # of Visits Approved:  (visits based off medical necessity)  Total # of Visits to Date: 2  Plan of Care/Certification Expiration Date: 22  No Show: 0  Canceled Appointment: 0  Progress Note Counter:  (PN due 4/15/22)    Subjective: Pt reports no pain at arrival. States pain still unexpected occurs at night and while sitting. No direct aggrevating factors known. Pt has been stretching at home. HEP Compliance:  [x] Good [] Fair [] Poor [] Reports not doing due to:    Vital Signs  Patient Currently in Pain: Yes   Pain Screening  Patient Currently in Pain: Yes    OBJECTIVE:   Exercises  Exercise 1: Gastroc stretches: via SB and stretching at wall x 5 min  Exercise 2: 4 way ankle: YTB x 15-20 ea  Exercise 3: seated heel raises w/ 2\" box: x 30   Handouts provided for HEP    Manual:   Manual therapy  Soft Tissue Mobalization: Using \"the stick\" STM on gastroc; total manual 10 minutes (pt seated with table raised)    Modalities:  Modalities  Ultrasound: US on left Achilles in seated postion 1.2W/cm2, 3MHz, continous, 4 minutes; CONT NV **     *Indicates exercise, modality, or manual techniques to be initiated when appropriate    Assessment: Body structures, Functions, Activity limitations: Decreased ROM,Decreased strength,Increased pain  Assessment: Added a couple new exercises to HEP to improve L achilles flexibility and strength.  Education provided to pt about the importance of tissue perfusion for heal which can be improved via STM and active exercises. Discussed ways to \"float heels\" while sleeping to reduce ischemic pressue at posterior heels. No pain at end of session. Plan to resume US NV for pain relief if necessary. Treatment Diagnosis: left ankle pain, decreased left ankle ROM, decreased left ankle strength  Prognosis: Good       Goals:       Long term goals  Time Frame for Long term goals : 4 weeks  Long term goal 1: Patient will report </= 1/10 pain in left Achilles with all movements. Long term goal 2: Patient will increase left DF ROM >/= 20 degrees for improved functional tolerance. Long term goal 3: Patient will increase left ankle strength to 5/5 for improved ambulation tolerance. Long term goal 4: LEFS >/= 25/80 to demonstrate functional improvements. Long term goal 5: Patient will be independent with HEP. Progress toward goals: cont toward all     POST-PAIN       Pain Rating (0-10 pain scale):  0 /10   Location and pain description same as pre-treatment unless indicated. Action: [] NA   [x] Perform HEP  [x] Meds as prescribed  [] Modalities as prescribed   [] Call Physician     Frequency/Duration:  Plan  Times per week: 2  Plan weeks: 4  Current Treatment Recommendations: Sapna Mackay Re-education,Manual Therapy - Soft Tissue Mobilization,Manual Therapy - Joint Manipulation,Home Exercise Program,Patient/Caregiver Education & Training,Modalities     Pt to continue current HEP. See objective section for any therapeutic exercise changes, additions or modifications this date.          PT Individual Minutes  Time In: 1300  Time Out: 9065  Minutes: 45  Timed Code Treatment Minutes: 42 Minutes  Procedure Minutes: 0     Timed Activity Minutes Units   Ther Ex 32 2   Manual  10 1       Signature:  Electronically signed by Guillermina Mccloud PT on 3/30/22 at 1:52 PM EDT

## 2022-04-01 ENCOUNTER — HOSPITAL ENCOUNTER (OUTPATIENT)
Dept: PHYSICAL THERAPY | Age: 82
Setting detail: THERAPIES SERIES
Discharge: HOME | End: 2022-04-01
Payer: MEDICARE

## 2022-04-01 PROCEDURE — 97140 MANUAL THERAPY 1/> REGIONS: CPT

## 2022-04-01 PROCEDURE — 97110 THERAPEUTIC EXERCISES: CPT

## 2022-04-01 PROCEDURE — 97035 APP MDLTY 1+ULTRASOUND EA 15: CPT

## 2022-04-01 ASSESSMENT — PAIN SCALES - GENERAL: PAINLEVEL_OUTOF10: 8

## 2022-04-01 ASSESSMENT — PAIN DESCRIPTION - PAIN TYPE: TYPE: CHRONIC PAIN

## 2022-04-01 ASSESSMENT — PAIN DESCRIPTION - DESCRIPTORS: DESCRIPTORS: SHARP

## 2022-04-01 ASSESSMENT — PAIN DESCRIPTION - LOCATION: LOCATION: ANKLE

## 2022-04-01 ASSESSMENT — PAIN DESCRIPTION - ORIENTATION: ORIENTATION: LEFT

## 2022-04-01 NOTE — PROGRESS NOTES
[address] [address]  Outpatient Physical Therapy    Treatment Note        Date: 2022  Patient: Curtis Birmingham  : 1940  ACCT #: [de-identified]  Referring Practitioner: Tony Martin DPM  Diagnosis: Chronic pain of left ankle; Achilles tendinitis of left lower extremity  Treatment Diagnosis: left ankle pain, decreased left ankle ROM, decreased left ankle strength    Visit Information:  PT Visit Information  PT Insurance Information: [employer] Medicare  Total # of Visits Approved:  (visits based off medical necessity)  Total # of Visits to Date: 3  Plan of Care/Certification Expiration Date: 22  No Show: 0  Canceled Appointment: 0  Progress Note Counter: 3/8 (PN due 4/15/22)    Subjective: Felt great for remainder of day after last session, but back/neck was suspectedly sore the next day d/t sitting unsupported on tx table. HEP Compliance:  [x] Good [] Fair [] Poor [] Reports not doing due to:    Vital Signs  Patient Currently in Pain: Yes   Pain Screening  Patient Currently in Pain: Yes  Pain Assessment  Pain Assessment: 0-10  Pain Level: 8  Pain Type: Chronic pain  Pain Location: Ankle  Pain Orientation: Left  Pain Descriptors: Sharp    OBJECTIVE:   Exercises  Exercise 1: Ankle AROM, flex/ext with hold for tissue stretch  Exercise 2: RTB, ankle PF x 30, RTC ankle DF x 20  Exercise 3: NS: L5 x 5 min    Strength: [x] NT  [] MMT completed:    ROM: [x] NT  [] ROM measurements:     Manual:   Manual therapy  Soft Tissue Mobalization: L gastroc/achilles with rubber roller; total manual 10 minutes (pt seated with table raised)    Modalities:  Modalities  Ultrasound: US on left Achilles in seated postion 1.0 W/cm2, 3MHz, continous, 8 minutes   Improved ankle DF ROM displayed post application     *Indicates exercise, modality, or manual techniques to be initiated when appropriate    Assessment:       Body structures, Functions, Activity limitations: Decreased ROM,Decreased strength,Increased pain  Assessment: Continued ultrasound and soft tissue massge to L achilles for tissue perfusion and improved healing. Muscle tenderness and trigger points noted throughout soft tissue that reduced with STM. Pt encouraged to use t-band for ankle exercises vs AROM alone to better improve achilles strength for decreased pain. No pain to report at conclusion of session. Treatment Diagnosis: left ankle pain, decreased left ankle ROM, decreased left ankle strength  Prognosis: Good       Goals:       Long term goals  Time Frame for Long term goals : 4 weeks  Long term goal 1: Patient will report </= 1/10 pain in left Achilles with all movements. Long term goal 2: Patient will increase left DF ROM >/= 20 degrees for improved functional tolerance. Long term goal 3: Patient will increase left ankle strength to 5/5 for improved ambulation tolerance. Long term goal 4: LEFS >/= 25/80 to demonstrate functional improvements. Long term goal 5: Patient will be independent with HEP. Progress toward goals: improved pain levels post tx    POST-PAIN       Pain Rating (0-10 pain scale):   0/10   Location and pain description same as pre-treatment unless indicated. Action: [] NA   [x] Perform HEP  [] Meds as prescribed  [x] Modalities as prescribed   [] Call Physician     Frequency/Duration:  Plan  Times per week: 2  Plan weeks: 4  Current Treatment Recommendations: Tunde Huerta Re-education,Manual Therapy - Soft Tissue Mobilization,Manual Therapy - Joint Manipulation,Home Exercise Program,Patient/Caregiver Education & Training,Modalities     Pt to continue current HEP. See objective section for any therapeutic exercise changes, additions or modifications this date.          PT Individual Minutes  Time In: 1120  Time Out: 6282  Minutes: 43  Timed Code Treatment Minutes: 40 Minutes  Procedure Minutes: 0     Timed Activity Minutes Units   Ther Ex 20 1   US 10 1   Manual  10 1       Signature: Electronically signed by Milagros Kirkpatrick PT on 4/1/22 at 12:13 PM EDT

## 2022-04-08 ENCOUNTER — HOSPITAL ENCOUNTER (OUTPATIENT)
Dept: PHYSICAL THERAPY | Age: 82
Setting detail: THERAPIES SERIES
Discharge: HOME | End: 2022-04-08
Payer: MEDICARE

## 2022-04-08 PROCEDURE — 97110 THERAPEUTIC EXERCISES: CPT

## 2022-04-08 PROCEDURE — 97140 MANUAL THERAPY 1/> REGIONS: CPT

## 2022-04-08 ASSESSMENT — PAIN SCALES - GENERAL: PAINLEVEL_OUTOF10: 5

## 2022-04-08 ASSESSMENT — PAIN DESCRIPTION - LOCATION: LOCATION: ANKLE

## 2022-04-08 ASSESSMENT — PAIN DESCRIPTION - PAIN TYPE: TYPE: CHRONIC PAIN

## 2022-04-08 ASSESSMENT — PAIN DESCRIPTION - DESCRIPTORS: DESCRIPTORS: SHARP

## 2022-04-08 ASSESSMENT — PAIN DESCRIPTION - ORIENTATION: ORIENTATION: LEFT

## 2022-04-08 NOTE — PROGRESS NOTES
[address] [address]  Outpatient Physical Therapy    Treatment Note        Date: 2022  Patient: Dana Renae  : 1940  ACCT #: [de-identified]  Referring Practitioner: Ashkan Ivy DPM  Diagnosis: Chronic pain of left ankle; Achilles tendinitis of left lower extremity  Treatment Diagnosis: left ankle pain, decreased left ankle ROM, decreased left ankle strength    Visit Information:  PT Visit Information  PT Insurance Information: [employer] Medicare  Total # of Visits Approved:  (visits based off medical necessity)  Total # of Visits to Date: 4  Plan of Care/Certification Expiration Date: 22  No Show: 0  Progress Note Due Date: 04/15/22  Canceled Appointment: 0  Progress Note Counter:  (PN due 4/15/22)    Subjective: Patient reports she felt good for about one day but continues to have pain. Reports some knee soreness     HEP Compliance:  [x] Good [] Fair [] Poor [] Reports not doing due to:    Vital Signs  Patient Currently in Pain: Yes   Pain Screening  Patient Currently in Pain: Yes  Pain Assessment  Pain Assessment: 0-10  Pain Level: 5  Pain Type: Chronic pain  Pain Location: Ankle  Pain Orientation: Left  Pain Descriptors: Sharp    OBJECTIVE:   Exercises  Exercise 1: Ankle AROM, flex/ext with hold for tissue stretch  Exercise 4: ankle circles  Exercise 7: ankle ABCs x 1  Exercise 20: HEP: educated about only completing exercises 2x at most, educated not to over do exercises         Strength: [x] NT  [] MMT completed:                   ROM: [x] NT  [] ROM measurements:                             Manual:   Manual therapy  Soft Tissue Mobalization: L gastroc/achilles STM: total manual 18 minutes    Modalities:  Modalities  Ultrasound: US on left Achilles in recliner position on mat with towel roll under calf; 1.0 w/cm2, 3MHz, 8 minutes     *Indicates exercise, modality, or manual techniques to be initiated when appropriate    Assessment:       Body structures, Functions, Activity limitations: Decreased ROM,Decreased strength,Increased pain  Assessment: Patient reports good tolerance to STM on left achilles, gastoc, and heel for improve healing. Patient eduated to not over do the exercises to avoid increased pain in knee and left ankle. Patient reports decreased pain post session. Continue per POC. Treatment Diagnosis: left ankle pain, decreased left ankle ROM, decreased left ankle strength  Prognosis: Good       Goals:       Long term goals  Time Frame for Long term goals : 4 weeks  Long term goal 1: Patient will report </= 1/10 pain in left Achilles with all movements. Long term goal 2: Patient will increase left DF ROM >/= 20 degrees for improved functional tolerance. Long term goal 3: Patient will increase left ankle strength to 5/5 for improved ambulation tolerance. Long term goal 4: LEFS >/= 25/80 to demonstrate functional improvements. Long term goal 5: Patient will be independent with HEP. Progress toward goals: ROM, strength    POST-PAIN       Pain Rating (0-10 pain scale):   0/10   Location and pain description same as pre-treatment unless indicated. Action: [] NA   [] Perform HEP  [] Meds as prescribed  [] Modalities as prescribed   [] Call Physician     Frequency/Duration:  Plan  Times per week: 2  Plan weeks: 4  Current Treatment Recommendations: Valencia Martinez Re-education,Manual Therapy - Soft Tissue Mobilization,Manual Therapy - Joint Manipulation,Home Exercise Program,Patient/Caregiver Education & Training,Modalities     Pt to continue current HEP. See objective section for any therapeutic exercise changes, additions or modifications this date.          PT Individual Minutes  Time In: 1125  Time Out: 1200  Minutes: 35  Timed Code Treatment Minutes: 35 Minutes  Procedure Minutes:0 minutes     Timed Activity Minutes Units   Ther Ex 9 1   Ultrasound  8 0   Manual  18 1       Signature:  Electronically signed by Margaret Marti PT on 4/8/22 at 12:44 PM EDT

## 2022-04-12 ENCOUNTER — HOSPITAL ENCOUNTER (OUTPATIENT)
Dept: PHYSICAL THERAPY | Age: 82
Setting detail: THERAPIES SERIES
Discharge: HOME | End: 2022-04-12
Payer: MEDICARE

## 2022-04-12 PROCEDURE — 97110 THERAPEUTIC EXERCISES: CPT

## 2022-04-12 PROCEDURE — 97140 MANUAL THERAPY 1/> REGIONS: CPT

## 2022-04-12 ASSESSMENT — PAIN DESCRIPTION - DESCRIPTORS: DESCRIPTORS: SHARP

## 2022-04-12 ASSESSMENT — PAIN DESCRIPTION - PAIN TYPE: TYPE: CHRONIC PAIN

## 2022-04-12 ASSESSMENT — PAIN DESCRIPTION - ORIENTATION: ORIENTATION: LEFT

## 2022-04-12 ASSESSMENT — PAIN DESCRIPTION - LOCATION: LOCATION: ANKLE

## 2022-04-12 ASSESSMENT — PAIN SCALES - GENERAL: PAINLEVEL_OUTOF10: 7

## 2022-04-12 NOTE — PROGRESS NOTES
[address] [address]  Outpatient Physical Therapy    Treatment Note        Date: 2022  Patient: Kaitlyn Howell  : 1940  ACCT #: [de-identified]  Referring Practitioner: Sita Molina DPM  Diagnosis: Chronic pain of left ankle; Achilles tendinitis of left lower extremity  Treatment Diagnosis: left ankle pain, decreased left ankle ROM, decreased left ankle strength    Visit Information:  PT Visit Information  PT Insurance Information: [employer] Medicare  Total # of Visits Approved:  (visits based off medical necessity)  Total # of Visits to Date: 5  Plan of Care/Certification Expiration Date: 22  No Show: 0  Canceled Appointment: 0  Progress Note Counter:  (PN due 4/15/22)    Subjective: Patient reports she continues to have pain in heel and mid achilles. HEP Compliance:  [x] Good [] Fair [] Poor [] Reports not doing due to:    Vital Signs  Patient Currently in Pain: Yes   Pain Screening  Patient Currently in Pain: Yes  Pain Assessment  Pain Assessment: 0-10  Pain Level: 7  Pain Type: Chronic pain  Pain Location: Ankle  Pain Orientation: Left  Pain Descriptors: Sharp    OBJECTIVE:   Exercises  Exercise 1: Ankle AROM, flex/ext with hold for tissue stretch  Exercise 4: ankle circles  Exercise 8: seated ankle pumps with cues for eccentric lowering x 10  Exercise 20: HEP: continue with current       Strength: [x] NT  [] MMT completed:                   ROM: [x] NT  [] ROM measurements:                             Manual:   Manual therapy  Soft Tissue Mobalization: STM left insertion of Achilles, Achilles; Used \"stick\" for left gastroc to decrease tightness  Other: educated to try rolling pin at home; total manual: 30 minutes        *Indicates exercise, modality, or manual techniques to be initiated when appropriate    Assessment:         Body structures, Functions, Activity limitations: Decreased ROM,Decreased strength,Increased pain  Assessment: Patient continues to report pain on heel into mid Achilles. Continue to use manual to increase healing along achilles into gastro. Patient demonstrates multiple tender points along Achilles. Educated to continue with exercises as she tolerates. Continue per POC. Treatment Diagnosis: left ankle pain, decreased left ankle ROM, decreased left ankle strength  Prognosis: Good       Goals:       Long term goals  Time Frame for Long term goals : 4 weeks  Long term goal 1: Patient will report </= 1/10 pain in left Achilles with all movements. Long term goal 2: Patient will increase left DF ROM >/= 20 degrees for improved functional tolerance. Long term goal 3: Patient will increase left ankle strength to 5/5 for improved ambulation tolerance. Long term goal 4: LEFS >/= 25/80 to demonstrate functional improvements. Long term goal 5: Patient will be independent with HEP. Progress toward goals: ROM, decrease pain    POST-PAIN       Pain Rating (0-10 pain scale):   \"better\"/10   Location and pain description same as pre-treatment unless indicated. Action: [] NA   [] Perform HEP  [] Meds as prescribed  [] Modalities as prescribed   [] Call Physician     Frequency/Duration:  Plan  Times per week: 2  Plan weeks: 4  Current Treatment Recommendations: Verba Roc Re-education,Manual Therapy - Soft Tissue Mobilization,Manual Therapy - Joint Manipulation,Home Exercise Program,Patient/Caregiver Education & Training,Modalities     Pt to continue current HEP. See objective section for any therapeutic exercise changes, additions or modifications this date.          PT Individual Minutes  Time In: 1300  Time Out: 2727  Minutes: 40  Timed Code Treatment Minutes: 40 Minutes  Procedure Minutes:0 minutes     Timed Activity Minutes Units   Ther Ex 10 1   Manual  30 2       Signature:  Electronically signed by Marina Gleason PT on 4/12/22 at 5:01 PM EDT

## 2022-04-15 ENCOUNTER — HOSPITAL ENCOUNTER (OUTPATIENT)
Dept: PHYSICAL THERAPY | Age: 82
Setting detail: THERAPIES SERIES
Discharge: HOME | End: 2022-04-15
Payer: MEDICARE

## 2022-04-15 PROCEDURE — 97110 THERAPEUTIC EXERCISES: CPT

## 2022-04-15 PROCEDURE — 97140 MANUAL THERAPY 1/> REGIONS: CPT

## 2022-04-15 ASSESSMENT — PAIN DESCRIPTION - DESCRIPTORS: DESCRIPTORS: SHARP;ACHING

## 2022-04-15 ASSESSMENT — PAIN DESCRIPTION - ORIENTATION: ORIENTATION: LEFT

## 2022-04-15 ASSESSMENT — PAIN DESCRIPTION - PAIN TYPE: TYPE: CHRONIC PAIN

## 2022-04-15 ASSESSMENT — PAIN SCALES - GENERAL: PAINLEVEL_OUTOF10: 6

## 2022-04-15 ASSESSMENT — PAIN DESCRIPTION - LOCATION: LOCATION: ANKLE

## 2022-04-19 ENCOUNTER — APPOINTMENT (OUTPATIENT)
Dept: PHYSICAL THERAPY | Age: 82
End: 2022-04-19
Payer: MEDICARE

## 2022-04-22 ENCOUNTER — APPOINTMENT (OUTPATIENT)
Dept: PHYSICAL THERAPY | Age: 82
End: 2022-04-22
Payer: MEDICARE

## 2022-04-26 ENCOUNTER — HOSPITAL ENCOUNTER (OUTPATIENT)
Dept: PHYSICAL THERAPY | Age: 82
Setting detail: THERAPIES SERIES
Discharge: HOME | End: 2022-04-26
Payer: MEDICARE

## 2022-04-26 PROCEDURE — 97140 MANUAL THERAPY 1/> REGIONS: CPT

## 2022-04-26 PROCEDURE — 97110 THERAPEUTIC EXERCISES: CPT

## 2022-04-26 NOTE — DISCHARGE SUMMARY
[address]     Ph: [phone]  Fax: [phone]      [] Certification  [] Recertification []  Plan of Care  [] Progress Note [x] Discharge      Referring Provider: David Penaloza,*      From:  Raffy Shepehrd, PT   Patient: Sheng Peguero ([de-identified] y.o. female) : 1940 Date: 2022   Medical Diagnosis: Pain in left ankle and joints of left foot [M25.572]  Other chronic pain [G89.29]  Achilles tendinitis, left leg [M76.62]    Treatment Diagnosis: left ankle pain, decreased left ankle ROM, decreased left ankle strength    Plan of Care/Certification Expiration Date: : 22   Progress Report Period from:  3/18/2022  to 2022    Visits to Date: 6 No Show: 0 Cancelled Appts: 0    OBJECTIVE:   Short Term Goals =Long term goals    Long Term Goals - Time Frame for Long term goals : 4 weeks  Goals Current/ Discharge status Met   Long term goal 1: Patient will report </= 1/10 pain in left Achilles with all movements. Patient reports 0/10 pain post treatment. Continues to report sharp pain at times but does not last long. LTG Goal 1 Status[de-identified] Not Met   Long term goal 2: Patient will increase left DF ROM >/= 20 degrees for improved functional tolerance. AROM LLE (degrees)  L Ankle Dorsiflexion 0-20: 12 deg      LTG Goal 2 Status[de-identified] Not Met   Long term goal 3: Patient will increase left ankle strength to 5/5 for improved ambulation tolerance. Strength LLE  L Ankle Dorsiflexion: 5/5  L Ankle Plantar Flexion: 5/5  L Ankle Inversion: 5/5  L Ankle Eversion: 5/5            LTG Goal 3 Status[de-identified] Met   Long term goal 4: LEFS >/=  to demonstrate functional improvements.  LTG Goal 4 Status[de-identified] Met   Long term goal 5: Patient will be independent with HEP. Patient issued HEP and has good understanding to continue at home.  LTG Goal 5 Status[de-identified] Met       Assessment: Patient demonstrates improvements in strength and ROM in left ankle since coming to PT. Patient continues to reports sharp pain that does not last but improves with movement. Patient independent in all exercises and educated on proper heel strike with ambulation. At this time, patient will be discharged from PT and continue with exercises and progressions. If pain increases, patient educated to follow up with Dr. Yaw Manjarrez. PLAN:   Frequency/Duration:  Plan Comment: D/C from PT     Precautions:                            Patient Status:[] Continue/ Initiate plan of Care    [x] Discharge PT. Recommend pt continue with HEP. [] Additional visits requested, Please re-certify for additional visits:        Signature: Electronically signed by Reyes Cortes PT on 4/26/22 at 2:07 PM EDT      If you have any questions or concerns, please don't hesitate to call. Thank you for your referral.    I have reviewed this plan of care and certify a need for medically necessary rehabilitation services.     Physician Signature:__________________________________________________________  Date:  Please sign and return

## 2022-04-26 NOTE — PROGRESS NOTES
[facility]  Outpatient Physical Therapy    Treatment Note        Date: 2022  Patient: Macie Barahona  : 1940   Confirmed: Yes  MRN: [id number]  Referring Provider: Michael Melendez   Secondary Referring Provider (If applicable):     Medical Diagnosis: Pain in left ankle and joints of left foot [M25.572]  Other chronic pain [G89.29]  Achilles tendinitis, left leg [M76.62]    Treatment Diagnosis: left ankle pain, decreased left ankle ROM, decreased left ankle strength    Visit Information:  Insurance: Payor: Bri Shape / Plan: Claudia Maker PPO / Product Type: Medicare /   PT Visit Information  Total # of Visits Approved: 7 (visits based off medical necessity)  Total # of Visits to Date: 6  Plan of Care/Certification Expiration Date: 22  No Show: 0  Progress Note Due Date: 04/15/22  Canceled Appointment: 0  Progress Note Counter:  (PN due 4/15/22)    Subjective Information:  Subjective: Patient reports the exercises decrease her pain but it comes back. Reports she is ok with all the exercises. Reports 9/10 pain at its worse that does not last long.   HEP Compliance:  [x] Good [] Fair [] Poor [] Reports not doing due to:    Pain Screening  Patient Currently in Pain: No    Treatment:  Exercises:  Exercises  Exercise 1: L gastroc stretch with strap 20s x 3  Exercise 5: single steps with gentle heel strike  Exercise 9: educated on progression of exercises; reviewed all exercises  Exercise 20: HEP: continue with current    Manual:   Manual Therapy  Soft Tissue Mobilizaton: STM at insertion of left Achilles and up left gastroc musculature x 18 min      *Indicates exercise, modality, or manual techniques to be initiated when appropriate    Objective Measures:         Strength: [x] NT  [] MMT completed:                   ROM: [] NT  [x] ROM measurements:     AROM LLE (degrees)  L Ankle Dorsiflexion 0-20: 12 deg                 Assessment:      Assessment: Patient demonstrates improvements in strength and ROM in left ankle since coming to PT. Patient continues to reports sharp pain that does not last but improves with movement. Patient independent in all exercises and educated on proper heel strike with ambulation. At this time, patient will be discharged from PT and continue with exercises and progressions. If pain increases, patient educated to follow up with Dr. Iker Spivey. Treatment Diagnosis: left ankle pain, decreased left ankle ROM, decreased left ankle strength           Post-Pain Assessment:       Pain Rating (0-10 pain scale):   0/10   Location and pain description same as pre-treatment unless indicated. Action: [] NA   [] Perform HEP  [] Meds as prescribed  [] Modalities as prescribed   [] Call Physician     GOALS   Patient Goal(s): Patient goals : \"decrease pain\"        Long Term Goals Completed by 4 weeks Goal Status   LTG 1 Patient will report </= 1/10 pain in left Achilles with all movements. Not Met   LTG 2 Patient will increase left DF ROM >/= 20 degrees for improved functional tolerance. Not Met   LTG 3 Patient will increase left ankle strength to 5/5 for improved ambulation tolerance. Met   LTG 4 LEFS >/= 25/80 to demonstrate functional improvements. Met   LTG 5 Patient will be independent with HEP. Met            Plan:  Frequency/Duration:  Plan  Plan Comment: D/C from PT  Pt to continue current HEP. See objective section for any therapeutic exercise changes, additions or modifications this date.     Therapy Time:      PT Individual Minutes  Time In: 9470  Time Out: 1200  Minutes: 35  Timed Code Treatment Minutes: 35 Minutes  Procedure Minutes:0 minutes  Timed Activity Minutes Units   Ther Ex 17 1   Manual  18 1     Electronically signed by:   Vera Alonzo, PT  Date: 4/26/2022

## 2022-04-29 ENCOUNTER — APPOINTMENT (OUTPATIENT)
Dept: PHYSICAL THERAPY | Age: 82
End: 2022-04-29
Payer: MEDICARE

## 2023-01-22 ENCOUNTER — HOSPITAL ENCOUNTER (EMERGENCY)
Age: 83
Discharge: HOME | End: 2023-01-22
Attending: STUDENT IN AN ORGANIZED HEALTH CARE EDUCATION/TRAINING PROGRAM
Payer: MEDICARE

## 2023-01-22 ENCOUNTER — APPOINTMENT (OUTPATIENT)
Dept: CT IMAGING | Age: 83
End: 2023-01-22
Payer: MEDICARE

## 2023-01-22 ENCOUNTER — APPOINTMENT (OUTPATIENT)
Dept: GENERAL RADIOLOGY | Age: 83
End: 2023-01-22
Payer: MEDICARE

## 2023-01-22 VITALS
HEART RATE: 97 BPM | BODY MASS INDEX: 31.65 KG/M2 | TEMPERATURE: 97.5 F | WEIGHT: 190 LBS | OXYGEN SATURATION: 96 % | DIASTOLIC BLOOD PRESSURE: 70 MMHG | RESPIRATION RATE: 16 BRPM | HEIGHT: 65 IN | SYSTOLIC BLOOD PRESSURE: 141 MMHG

## 2023-01-22 DIAGNOSIS — R07.89 ATYPICAL CHEST PAIN: ICD-10-CM

## 2023-01-22 DIAGNOSIS — R53.83 FATIGUE, UNSPECIFIED TYPE: ICD-10-CM

## 2023-01-22 DIAGNOSIS — U07.1 COVID-19: Primary | ICD-10-CM

## 2023-01-22 LAB
ALBUMIN SERPL-MCNC: 4.2 G/DL (ref 3.5–4.6)
ALP BLD-CCNC: 81 U/L (ref 40–130)
ALT SERPL-CCNC: 22 U/L (ref 0–33)
ANION GAP SERPL CALCULATED.3IONS-SCNC: 11 MEQ/L (ref 9–15)
APTT: 29.5 SEC (ref 24.4–36.8)
AST SERPL-CCNC: 20 U/L (ref 0–35)
BACTERIA: ABNORMAL /HPF
BASOPHILS ABSOLUTE: 0.1 K/UL (ref 0–0.2)
BASOPHILS RELATIVE PERCENT: 1.1 %
BILIRUB SERPL-MCNC: 0.9 MG/DL (ref 0.2–0.7)
BILIRUBIN URINE: NEGATIVE
BLOOD, URINE: NEGATIVE
BUN BLDV-MCNC: 18 MG/DL (ref 8–23)
CALCIUM SERPL-MCNC: 9.7 MG/DL (ref 8.5–9.9)
CHLORIDE BLD-SCNC: 101 MEQ/L (ref 95–107)
CLARITY: ABNORMAL
CO2: 28 MEQ/L (ref 20–31)
COLOR: YELLOW
CREAT SERPL-MCNC: 1.02 MG/DL (ref 0.5–0.9)
EOSINOPHILS ABSOLUTE: 0.1 K/UL (ref 0–0.7)
EOSINOPHILS RELATIVE PERCENT: 1.6 %
EPITHELIAL CELLS, UA: ABNORMAL /HPF (ref 0–5)
GFR SERPL CREATININE-BSD FRML MDRD: 55.3 ML/MIN/{1.73_M2}
GLOBULIN: 3.4 G/DL (ref 2.3–3.5)
GLUCOSE BLD-MCNC: 128 MG/DL (ref 70–99)
GLUCOSE URINE: NEGATIVE MG/DL
HCT VFR BLD CALC: 45.4 % (ref 37–47)
HEMOGLOBIN: 14.9 G/DL (ref 12–16)
HYALINE CASTS: ABNORMAL /HPF (ref 0–5)
INFLUENZA A BY PCR: NEGATIVE
INFLUENZA B BY PCR: NEGATIVE
INR BLD: 1
KETONES, URINE: NEGATIVE MG/DL
LACTIC ACID: 2.3 MMOL/L (ref 0.5–2.2)
LEUKOCYTE ESTERASE, URINE: ABNORMAL
LYMPHOCYTES ABSOLUTE: 1.8 K/UL (ref 1–4.8)
LYMPHOCYTES RELATIVE PERCENT: 20.3 %
MAGNESIUM: 1.9 MG/DL (ref 1.7–2.4)
MCH RBC QN AUTO: 30.3 PG (ref 27–31.3)
MCHC RBC AUTO-ENTMCNC: 32.8 % (ref 33–37)
MCV RBC AUTO: 92.5 FL (ref 79.4–94.8)
MONOCYTES ABSOLUTE: 0.7 K/UL (ref 0.2–0.8)
MONOCYTES RELATIVE PERCENT: 8.5 %
NEUTROPHILS ABSOLUTE: 6 K/UL (ref 1.4–6.5)
NEUTROPHILS RELATIVE PERCENT: 68.5 %
NITRITE, URINE: NEGATIVE
PDW BLD-RTO: 13.5 % (ref 11.5–14.5)
PH UA: 7 (ref 5–9)
PLATELET # BLD: 236 K/UL (ref 130–400)
POTASSIUM SERPL-SCNC: 4 MEQ/L (ref 3.4–4.9)
PRO-BNP: 74 PG/ML
PROTEIN UA: NEGATIVE MG/DL
PROTHROMBIN TIME: 13.7 SEC (ref 12.3–14.9)
RBC # BLD: 4.91 M/UL (ref 4.2–5.4)
RBC UA: ABNORMAL /HPF (ref 0–5)
SARS-COV-2, NAAT: DETECTED
SODIUM BLD-SCNC: 140 MEQ/L (ref 135–144)
SPECIFIC GRAVITY UA: 1.01 (ref 1–1.03)
TOTAL CK: 87 U/L (ref 0–170)
TOTAL PROTEIN: 7.6 G/DL (ref 6.3–8)
TROPONIN: <0.01 NG/ML (ref 0–0.01)
TROPONIN: <0.01 NG/ML (ref 0–0.01)
TSH REFLEX: 2.78 UIU/ML (ref 0.44–3.86)
URINE REFLEX TO CULTURE: ABNORMAL
UROBILINOGEN, URINE: 0.2 E.U./DL
WBC # BLD: 8.7 K/UL (ref 4.8–10.8)
WBC UA: ABNORMAL /HPF (ref 0–5)

## 2023-01-22 PROCEDURE — 81001 URINALYSIS AUTO W/SCOPE: CPT

## 2023-01-22 PROCEDURE — 85730 THROMBOPLASTIN TIME PARTIAL: CPT

## 2023-01-22 PROCEDURE — 83605 ASSAY OF LACTIC ACID: CPT

## 2023-01-22 PROCEDURE — 99285 EMERGENCY DEPT VISIT HI MDM: CPT

## 2023-01-22 PROCEDURE — 36415 COLL VENOUS BLD VENIPUNCTURE: CPT

## 2023-01-22 PROCEDURE — 87635 SARS-COV-2 COVID-19 AMP PRB: CPT

## 2023-01-22 PROCEDURE — 93005 ELECTROCARDIOGRAM TRACING: CPT | Performed by: EMERGENCY MEDICINE

## 2023-01-22 PROCEDURE — 84484 ASSAY OF TROPONIN QUANT: CPT

## 2023-01-22 PROCEDURE — 96374 THER/PROPH/DIAG INJ IV PUSH: CPT

## 2023-01-22 PROCEDURE — 71045 X-RAY EXAM CHEST 1 VIEW: CPT | Performed by: RADIOLOGY

## 2023-01-22 PROCEDURE — 82550 ASSAY OF CK (CPK): CPT

## 2023-01-22 PROCEDURE — 83735 ASSAY OF MAGNESIUM: CPT

## 2023-01-22 PROCEDURE — 71045 X-RAY EXAM CHEST 1 VIEW: CPT

## 2023-01-22 PROCEDURE — 85610 PROTHROMBIN TIME: CPT

## 2023-01-22 PROCEDURE — 6360000002 HC RX W HCPCS: Performed by: STUDENT IN AN ORGANIZED HEALTH CARE EDUCATION/TRAINING PROGRAM

## 2023-01-22 PROCEDURE — 2580000003 HC RX 258: Performed by: STUDENT IN AN ORGANIZED HEALTH CARE EDUCATION/TRAINING PROGRAM

## 2023-01-22 PROCEDURE — 83880 ASSAY OF NATRIURETIC PEPTIDE: CPT

## 2023-01-22 PROCEDURE — 87502 INFLUENZA DNA AMP PROBE: CPT

## 2023-01-22 PROCEDURE — 6360000004 HC RX CONTRAST MEDICATION: Performed by: STUDENT IN AN ORGANIZED HEALTH CARE EDUCATION/TRAINING PROGRAM

## 2023-01-22 PROCEDURE — 80053 COMPREHEN METABOLIC PANEL: CPT

## 2023-01-22 PROCEDURE — 84443 ASSAY THYROID STIM HORMONE: CPT

## 2023-01-22 PROCEDURE — 85025 COMPLETE CBC W/AUTO DIFF WBC: CPT

## 2023-01-22 PROCEDURE — 6370000000 HC RX 637 (ALT 250 FOR IP): Performed by: STUDENT IN AN ORGANIZED HEALTH CARE EDUCATION/TRAINING PROGRAM

## 2023-01-22 PROCEDURE — 71275 CT ANGIOGRAPHY CHEST: CPT

## 2023-01-22 RX ORDER — ACETAMINOPHEN 500 MG
1000 TABLET ORAL EVERY 6 HOURS PRN
Qty: 60 TABLET | Refills: 0 | Status: SHIPPED | OUTPATIENT
Start: 2023-01-22

## 2023-01-22 RX ORDER — 0.9 % SODIUM CHLORIDE 0.9 %
1000 INTRAVENOUS SOLUTION INTRAVENOUS ONCE
Status: COMPLETED | OUTPATIENT
Start: 2023-01-22 | End: 2023-01-22

## 2023-01-22 RX ORDER — IBUPROFEN 400 MG/1
400 TABLET ORAL EVERY 6 HOURS PRN
Qty: 120 TABLET | Refills: 0 | Status: SHIPPED | OUTPATIENT
Start: 2023-01-22

## 2023-01-22 RX ORDER — ACETAMINOPHEN 500 MG
1000 TABLET ORAL ONCE
Status: COMPLETED | OUTPATIENT
Start: 2023-01-22 | End: 2023-01-22

## 2023-01-22 RX ORDER — KETOROLAC TROMETHAMINE 15 MG/ML
15 INJECTION, SOLUTION INTRAMUSCULAR; INTRAVENOUS ONCE
Status: COMPLETED | OUTPATIENT
Start: 2023-01-22 | End: 2023-01-22

## 2023-01-22 RX ADMIN — KETOROLAC TROMETHAMINE 15 MG: 15 INJECTION, SOLUTION INTRAMUSCULAR; INTRAVENOUS at 21:07

## 2023-01-22 RX ADMIN — IOPAMIDOL 75 ML: 612 INJECTION, SOLUTION INTRAVENOUS at 21:45

## 2023-01-22 RX ADMIN — SODIUM CHLORIDE 1000 ML: 9 INJECTION, SOLUTION INTRAVENOUS at 19:50

## 2023-01-22 RX ADMIN — ACETAMINOPHEN 1000 MG: 500 TABLET ORAL at 19:49

## 2023-01-22 ASSESSMENT — ENCOUNTER SYMPTOMS
SORE THROAT: 0
DIARRHEA: 0
ABDOMINAL PAIN: 1
SHORTNESS OF BREATH: 1
EYE PAIN: 0
CONSTIPATION: 0
VOMITING: 0
COUGH: 0
RHINORRHEA: 0
NAUSEA: 0
BACK PAIN: 0
CHEST TIGHTNESS: 1

## 2023-01-22 ASSESSMENT — PAIN DESCRIPTION - LOCATION
LOCATION: CHEST

## 2023-01-22 ASSESSMENT — PAIN DESCRIPTION - PAIN TYPE: TYPE: ACUTE PAIN

## 2023-01-22 ASSESSMENT — PAIN SCALES - GENERAL
PAINLEVEL_OUTOF10: 6

## 2023-01-22 ASSESSMENT — PAIN - FUNCTIONAL ASSESSMENT
PAIN_FUNCTIONAL_ASSESSMENT: 0-10
PAIN_FUNCTIONAL_ASSESSMENT: NONE - DENIES PAIN

## 2023-01-22 ASSESSMENT — PAIN DESCRIPTION - DESCRIPTORS: DESCRIPTORS: PRESSURE

## 2023-01-23 LAB
EKG ATRIAL RATE: 105 BPM
EKG P AXIS: 50 DEGREES
EKG P-R INTERVAL: 162 MS
EKG Q-T INTERVAL: 340 MS
EKG QRS DURATION: 86 MS
EKG QTC CALCULATION (BAZETT): 449 MS
EKG R AXIS: -15 DEGREES
EKG T AXIS: 4 DEGREES
EKG VENTRICULAR RATE: 105 BPM

## 2023-01-23 PROCEDURE — 93010 ELECTROCARDIOGRAM REPORT: CPT | Performed by: INTERNAL MEDICINE

## 2023-01-23 NOTE — ED PROVIDER NOTES
[address] [facility] ED  eMERGENCY dEPARTMENT eNCOUnter      Pt Name: Nimisha Austin  MRN: [id number]  [place] 1940  Date of evaluation: 1/22/2023  Provider: Mariann Ramirez MD      HISTORY OF PRESENT ILLNESS      Chief Complaint   Patient presents with    Chest Pain       The history is provided by the Patient. Nimisha Austin is a [de-identified] y.o. female with a PMH clinically significant for HLD, DELILAH, OA, Lumbar stenosis, Obesity, GERD, IBS and chronic pain syndrome presenting to the ED via PV c/o left-sided chest pain with initial onset last Tuesday has been intermittent until today where it has been constant. Associated with shortness of breath, palpitations, lightheadedness and fatigue. Patient states symptoms are not worse with exertion, deep inspiration or movement. Denies any associated cough, hemoptysis, nausea, vomiting, changes in bowel movements, urinary symptoms. States she has felt very anxious regarding this and feels like she is going to crawl out of her skin. States she does not want to die in her sleep. Denies any associated BLE edema/pain, history of heart disease, history of DVT/PE or known arrhythmias. States she has been using her fentanyl patch as indicated. Has been eating and drinking normally. Thought that she might have COVID, so tested with her  last week which was negative. Denies any preceding exertional activities prior to symptoms. States that she did start working out more at [employer], but states it is very minimal regards to exertional   States no history of similar previous episodes. States they have otherwise been feeling well. Taking all medications as indicated. Per Chart Review: PMH as noted above obtained via outpatient chart review. Most recent admission for cholecystectomy in 08/2021 appreciated. REVIEW OF SYSTEMS       Review of Systems   Constitutional:  Positive for fatigue. Negative for chills and fever.    HENT:  Negative for rhinorrhea and sore throat. Eyes:  Negative for pain and visual disturbance. Respiratory:  Positive for chest tightness and shortness of breath. Negative for cough. Cardiovascular:  Positive for chest pain and palpitations. Negative for leg swelling. Gastrointestinal:  Positive for abdominal pain. Negative for constipation, diarrhea, nausea and vomiting. Genitourinary:  Negative for difficulty urinating and dysuria. Musculoskeletal:  Positive for myalgias. Negative for back pain and neck pain. Skin:  Negative for rash. Neurological:  Positive for light-headedness. Negative for weakness, numbness and headaches. Psychiatric/Behavioral:  The patient is nervous/anxious. PAST MEDICAL HISTORY     Past Medical History:   Diagnosis Date    Cancer of the skin, basal cell     right ear    Hyperlipidemia        SURGICAL HISTORY       Past Surgical History:   Procedure Laterality Date    APPENDECTOMY      BACK SURGERY      CHOLECYSTECTOMY, LAPAROSCOPIC N/A 8/30/2021    LAPAROSCOPIC CHOLECYSTECTOMY INTRAOPERATIVE CHOLANGIOGRAMS; COMMON BILE DUCT EXPLORATION performed by Anupama Nye MD at [facility] ([address] [place] [address])      JOINT REPLACEMENT Right     knee    JOINT REPLACEMENT Right     hip    JOINT REPLACEMENT Left     knee       FAMILY HISTORY     History reviewed. No pertinent family history.     SOCIAL HISTORY       Social History     Socioeconomic History    Marital status:      Spouse name: None    Number of children: None    Years of education: None    Highest education level: None   Tobacco Use    Smoking status: Never    Smokeless tobacco: Never   Substance and Sexual Activity    Alcohol use: Never    Drug use: Never       CURRENT MEDICATIONS       Discharge Medication List as of 1/22/2023 11:09 PM        CONTINUE these medications which have NOT CHANGED    Details   calcium carbonate-vitamin D (CALCIUM 600+D) 600-200 MG-UNIT TABS Historical Med      Gabapentin, Once-Daily, (GRALISE) 600 MG TABS Take by mouth. Historical Med      guaiFENesin (MUCINEX) 600 MG extended release tablet Historical Med      Multiple Vitamins-Minerals (THERAPEUTIC MULTIVITAMIN-MINERALS) tablet Take 1 tablet by mouth dailyHistorical Med      lovastatin (MEVACOR) 20 MG tablet 40 mg Historical Med      loperamide (IMODIUM A-D) 2 MG tablet Take 2 mg by mouth as neededHistorical Med      fentaNYL (DURAGESIC) 50 MCG/HR APPLY 1 PATCH TO SKIN EVERY 72 HOURS. Historical Med      vitamin D (CHOLECALCIFEROL) 25 MCG (1000 UT) TABS tablet Take by mouthHistorical Med             ALLERGIES     Codeine      PHYSICAL EXAM       ED Triage Vitals [01/22/23 1908]   BP Temp Temp Source Heart Rate Resp SpO2 Height Weight   (!) 163/90 97.5 °F (36.4 °C) Temporal (!) 101 16 100 % 5' 5\" (1.651 m) 190 lb (86.2 kg)       Physical Exam  Vitals and nursing note reviewed. Constitutional:       General: She is not in acute distress. Appearance: She is obese. She is not ill-appearing, toxic-appearing or diaphoretic. HENT:      Head: Normocephalic and atraumatic. Mouth/Throat:      Mouth: Mucous membranes are moist.      Pharynx: Oropharynx is clear. No oropharyngeal exudate or posterior oropharyngeal erythema. Eyes:      Extraocular Movements: Extraocular movements intact. Conjunctiva/sclera: Conjunctivae normal.      Pupils: Pupils are equal, round, and reactive to light. Cardiovascular:      Rate and Rhythm: Regular rhythm. Tachycardia present. Pulses: Normal pulses. Heart sounds: Normal heart sounds. Pulmonary:      Effort: Pulmonary effort is normal. No tachypnea or respiratory distress. Breath sounds: Normal breath sounds. Chest:      Chest wall: Tenderness present. No mass. Abdominal:      General: There is no distension. Palpations: Abdomen is soft. Tenderness: There is no abdominal tenderness. There is no guarding or rebound. Musculoskeletal:         General: No tenderness or deformity. Cervical back: Normal range of motion and neck supple. No rigidity or tenderness. Right lower leg: No edema. Left lower leg: No edema. Skin:     General: Skin is warm and dry. Capillary Refill: Capillary refill takes less than 2 seconds. Neurological:      General: No focal deficit present. Mental Status: She is alert and oriented to person, place, and time. Psychiatric:         Mood and Affect: Mood is anxious. Behavior: Behavior is cooperative.        MDM:   Chart Reviewed: PMH and additional information as noted in HPI obtained from chart review    Vitals:    Vitals:    01/22/23 2000 01/22/23 2030 01/22/23 2200 01/22/23 2230   BP:  (!) 112/91 137/67 (!) 141/70   Pulse: 96 (!) 109 98 97   Resp:       Temp:       TempSrc:       SpO2: 96% 97% 97% 96%   Weight:       Height:           PROCEDURES:  Unless otherwise noted below, none  Procedures    LABS:  Labs Reviewed   COVID-19, RAPID - Abnormal; Notable for the following components:       Result Value    SARS-CoV-2, NAAT DETECTED (*)     All other components within normal limits   COMPREHENSIVE METABOLIC PANEL - Abnormal; Notable for the following components:    Glucose 128 (*)     Creatinine 1.02 (*)     Est, Glom Filt Rate 55.3 (*)     Total Bilirubin 0.9 (*)     All other components within normal limits   CBC WITH AUTO DIFFERENTIAL - Abnormal; Notable for the following components:    MCHC 32.8 (*)     All other components within normal limits   LACTIC ACID - Abnormal; Notable for the following components:    Lactic Acid 2.3 (*)     All other components within normal limits   URINALYSIS WITH REFLEX TO CULTURE - Abnormal; Notable for the following components:    Clarity, UA CLOUDY (*)     Leukocyte Esterase, Urine SMALL (*)     All other components within normal limits   MICROSCOPIC URINALYSIS - Abnormal; Notable for the following components:    Bacteria, UA RARE (*)     WBC, UA 6-9 (*)     All other components within normal limits   RAPID INFLUENZA A/B ANTIGENS   PROTIME-INR   APTT   CK   TROPONIN   TSH WITH REFLEX   BRAIN NATRIURETIC PEPTIDE   MAGNESIUM   TROPONIN       XR CHEST PORTABLE   Final Result   No acute cardiopulmonary process. Stable exam.         CTA CHEST W WO CONTRAST    (Results Pending)       ED Course as of 01/23/23 0317   Sun Jan 22, 2023 2021 SARS-CoV-2, NAAT(!): DETECTED [NA]   2021 Influenza A by PCR: Negative [NA]   2021 Influenza B by PCR: Negative [NA]   2022 Magnesium: 1.9 [NA]   2022 INR: 1.0 [NA]   2022 aPTT: 29.5 [NA]   2054 XR CHEST PORTABLE  No gross acute cardiopulmonary abnormalities. [NA]   2110 EKG 12 Lead  EKG showing sinus tachycardia, rate of 105 bpm.  Normal axis. Normal voltage criteria for LVH. No gross acute ST-T wave abnormalities. [NA]   2110 Lactic Acid(!): 2.3  Mildly elevated lactic acid. [NA]   2111 Nitrite, Urine: Negative [NA]   2111 Epithelial Cells, UA: 20-50  Likely contaminated. Lower suspicion for UTI. [NA]   2111 Creatinine(!): 1.02  Mild YI likely 2/2 dehydration [NA]   2112 Troponin: <0.010  Lower suspicion for ACS. [NA]   2112 Pro-BNP: 74  Low suspicion for ADHFE. [NA]   2247 Troponin: <0.010  Delta troponin negative, low suspicion for ACS at this time. [NA]      ED Course User Index  [NA] Herman Antonio MD       [de-identified] y.o. female with a PMH clinically significant for HLD, DELILAH, OA, Lumbar stenosis, Obesity, GERD, IBS and chronic pain syndrome presenting to the ED via PV c/o left-sided chest pain with initial onset last Tuesday has been intermittent until today where it has been constant. Upon initial evaluation, Pt tachycardic in the setting of anxiety with rapid speech, but otherwise Afebrile, HDS and in NAD. PE as noted above. Labs, , EKG,, and Imaging interpreted by myself and as noted above. Given findings, clinical presentation most likely consistent w/ Active Covid infection with possible pleurisy and dehydration causing the patient symptoms in the ED. Although considered, very low suspicion for ACS given nonischemic EKG and negative delta troponin in the ED. Symptoms also not exertional.  Also consider possible pulmonary emboli given positive COVID testing and pleuritic nature of the chest pain. CT of the chest however without evidence of acute PE or other cardiopulmonary abnormalities. Labs significant for mild YI likely secondary to dehydration. Patient otherwise very well-appearing in the ED. Very anxious in the ED likely contributing to symptoms. Did feel improved following meds as noted below. Will discharge with Paxlovid given multiple comorbidities. No indications for antibiotics at this time. Low suspicion for UTI given findings as noted above. Pt was administered   Medications   0.9 % sodium chloride bolus (0 mLs IntraVENous Stopped 1/22/23 2050)   acetaminophen (TYLENOL) tablet 1,000 mg (1,000 mg Oral Given 1/22/23 1949)   ketorolac (TORADOL) injection 15 mg (15 mg IntraVENous Given 1/22/23 2107)   iopamidol (ISOVUE-300) 61 % injection 75 mL (75 mLs IntraVENous Given 1/22/23 2145)       Plan: Discharge home in good condition with meds as noted below and instructions to follow up with PCP . Pt stable and appropriate for further evaluation and management as an outpatient. Standard anticipatory guidance and strict return precautions given if any new or worsening symptoms. and Patient understanding and amenable to the POC. CRITICAL CARE TIME   Total CriticalCare time was 0 minutes, excluding separately reportable procedures. There was a high probability of clinically significant/life threatening deterioration in the patient's condition which required my urgent intervention. FINAL IMPRESSION      1. COVID-19    2. Fatigue, unspecified type    3.  Atypical chest pain          DISPOSITION/PLAN   DISPOSITION Decision To Discharge 01/22/2023 10:59:16 PM      Discharge Medication List as of 1/22/2023 11:09 PM        START taking these medications    Details   nirmatrelvir/ritonavir (PAXLOVID) 20 x 150 MG & 10 x 100MG TBPK Take 3 tablets (two 150 mg nirmatrelvir and one 100 mg ritonavir tablets) by mouth every 12 hours for 5 days. , Disp-30 tablet, R-0Normal      acetaminophen (TYLENOL) 500 MG tablet Take 2 tablets by mouth every 6 hours as needed for Pain or Fever, Disp-60 tablet, R-0Normal      ibuprofen (IBU) 400 MG tablet Take 1 tablet by mouth every 6 hours as needed for Pain, Disp-120 tablet, R-0Normal              MD Shakila Townsend MD  01/23/23 4771

## 2023-01-23 NOTE — DISCHARGE INSTRUCTIONS
Do not take your Lovastatin while you are taking paxlovid. Can restart this medication 2 days after you have completed your course of paxlovid.

## 2023-02-27 ENCOUNTER — OFFICE VISIT (OUTPATIENT)
Dept: GASTROENTEROLOGY | Age: 83
End: 2023-02-27
Payer: MEDICARE

## 2023-02-27 VITALS — HEART RATE: 62 BPM | WEIGHT: 218 LBS | OXYGEN SATURATION: 100 % | HEIGHT: 65 IN | BODY MASS INDEX: 36.32 KG/M2

## 2023-02-27 DIAGNOSIS — R10.9 ABDOMINAL CRAMPING: ICD-10-CM

## 2023-02-27 DIAGNOSIS — K58.0 IRRITABLE BOWEL SYNDROME WITH DIARRHEA: Primary | ICD-10-CM

## 2023-02-27 PROCEDURE — 99204 OFFICE O/P NEW MOD 45 MIN: CPT | Performed by: INTERNAL MEDICINE

## 2023-02-27 PROCEDURE — 1123F ACP DISCUSS/DSCN MKR DOCD: CPT | Performed by: INTERNAL MEDICINE

## 2023-02-27 RX ORDER — DICYCLOMINE HYDROCHLORIDE 10 MG/1
10 CAPSULE ORAL 2 TIMES DAILY
Qty: 60 CAPSULE | Refills: 3 | Status: SHIPPED | OUTPATIENT
Start: 2023-02-27

## 2023-02-27 NOTE — PROGRESS NOTES
Gastroenterology Clinic Visit    Amadou Bridges  [id number]  Chief Complaint   Patient presents with    New Patient     Patient diagnosed with IBS-D many years ago, symptoms worsening over the last 6 months. High urgency, some episodes of incontinence. Denies blood. Patient brought records, last colonoscopy (with Egd) in 2021. HPI: 82 y.o. female presents to the clinic with her  with longstanding symptoms of IBS with diarrhea. Patient reports worsening of symptoms over the last 6 months with worsening of urgency, loose bowel movements, abdominal cramps and lower abdominal pain. Patient reports having symptoms of lower abdominal cramps, abdominal pain and loose bowel movements over many years. Patient was seeing a gastroenterologist in the [facility], underwent EGD and colonoscopy both in 2017 and 2022, apart from diverticulosis no other abnormality noted. Patient reports no change in her diet, appetite, medications, health prior to the onset of worsening of her symptoms. She denies any significant weight loss or weight gain in the last 6 months. She takes Imodium on a as needed basis with symptom control. Previous GI work up/Endoscopic investigations:   EGD and colon: June 2021: At [facility] gastroenterology: Colonic diverticulosis otherwise negative    Review of Systems   All other systems reviewed and are negative.      Past Medical History:   Diagnosis Date    Cancer of the skin, basal cell     right ear    Hyperlipidemia      Past Surgical History:   Procedure Laterality Date    APPENDECTOMY      BACK SURGERY      CHOLECYSTECTOMY, LAPAROSCOPIC N/A 8/30/2021    LAPAROSCOPIC CHOLECYSTECTOMY INTRAOPERATIVE CHOLANGIOGRAMS; COMMON BILE DUCT EXPLORATION performed by Pernell Monteiro MD at [facility] ([address] [place] [address])      JOINT REPLACEMENT Right     knee    JOINT REPLACEMENT Right     hip    JOINT REPLACEMENT Left     knee     Current Outpatient Medications on File Prior to Visit   Medication Sig Dispense Refill    Misc Natural Products (GLUCOSAMINE CHONDROITIN ADV PO) Take by mouth      ibuprofen (IBU) 400 MG tablet Take 1 tablet by mouth every 6 hours as needed for Pain 120 tablet 0    calcium carbonate-vitamin D 600-200 MG-UNIT TABS       Gabapentin, Once-Daily, (GRALISE) 600 MG TABS Take by mouth.      guaiFENesin (MUCINEX) 600 MG extended release tablet       Multiple Vitamins-Minerals (THERAPEUTIC MULTIVITAMIN-MINERALS) tablet Take 1 tablet by mouth daily      lovastatin (MEVACOR) 20 MG tablet 40 mg       loperamide (IMODIUM A-D) 2 MG tablet Take 2 mg by mouth as needed      fentaNYL (DURAGESIC) 50 MCG/HR APPLY 1 PATCH TO SKIN EVERY 72 HOURS.      vitamin D (CHOLECALCIFEROL) 25 MCG (1000 UT) TABS tablet Take by mouth       No current facility-administered medications on file prior to visit. Family History   Problem Relation Age of Onset    Colon Cancer Neg Hx      Social History     Socioeconomic History    Marital status:    Tobacco Use    Smoking status: Never    Smokeless tobacco: Never   Substance and Sexual Activity    Alcohol use: Never    Drug use: Never       Pulse 62, height 5' 5\" (1.651 m), weight 218 lb (98.9 kg), SpO2 100 %. Physical Exam  Constitutional:       General: She is not in acute distress. Appearance: Normal appearance. She is well-developed. Eyes:      General: No scleral icterus. Cardiovascular:      Rate and Rhythm: Normal rate and regular rhythm. Pulmonary:      Effort: Pulmonary effort is normal.      Breath sounds: Normal breath sounds. Abdominal:      General: Bowel sounds are normal. There is no distension. Palpations: Abdomen is soft. There is no mass. Tenderness: There is no abdominal tenderness. There is no guarding or rebound. Musculoskeletal:         General: Normal range of motion. Lymphadenopathy:      Cervical: No cervical adenopathy.    Neurological:      Mental Status: She is alert and oriented to person, place, and time. Psychiatric:         Behavior: Behavior normal.         Thought Content: Thought content normal.         Judgment: Judgment normal.     Laboratory, Pathology, Radiology reviewed indetail with relevant important investigations summarized below:  Lab Results   Component Value Date    WBC 8.7 01/22/2023    HGB 14.9 01/22/2023    HCT 45.4 01/22/2023    MCV 92.5 01/22/2023     01/22/2023     No results found for: IRON, TIBC, FERRITIN  No results found for: Ricke Leopard   No results found for: FOLATE  Lab Results   Component Value Date    LABALBU 4.2 01/22/2023      Lab Results   Component Value Date    ALT 22 01/22/2023    AST 20 01/22/2023    ALKPHOS 81 01/22/2023    BILITOT 0.9 (H) 01/22/2023     CT Result (most recent):  CTA CHEST W WO CONTRAST 01/22/2023    Narrative  EXAMINATION:  CTA OF THE CHEST WITH AND WITHOUT CONTRAST 1/22/2023 9:45 pm    TECHNIQUE:  CTA of the chest was performed before and after the administration of  intravenous contrast.  Multiplanar reformatted images are provided for  review. MIP images are provided for review. Automated exposure control,  iterative reconstruction, and/or weight based adjustment of the mA/kV was  utilized to reduce the radiation dose to as low as reasonably achievable. COMPARISON:  None. HISTORY:  ORDERING SYSTEM PROVIDED HISTORY: Rule out PE  TECHNOLOGIST PROVIDED HISTORY:  Reason for exam:->Rule out PE  Decision Support Exception - unselect if not a suspected or confirmed  emergency medical condition->Emergency Medical Condition (MA)  What reading provider will be dictating this exam?->CRC    FINDINGS:  Aorta: No evidence of thoracic aortic aneurysm or dissection. No acute  abnormality of the aorta. Mediastinum: No evidence of mediastinal lymphadenopathy. The heart and  pericardium demonstrate no acute abnormality. Lungs/Pleura: The lungs are without acute process. No focal consolidation or  pulmonary edema.   No evidence of pleural effusion or pneumothorax. Upper Abdomen: Limited images of the upper abdomen are unremarkable. 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 Transpedicular screws and Jasmine rods are noted. .    Impression  1. There is no pulmonary embolus  2. There is no thoracic aortic aneurysm or dissection. 3. Calcified plaque within the coronary arteries  4. There are no findings of pneumonia or failure. Assessment and Plan:  82 y.o. female with clinical history consistent with IBS with diarrhea. Clinical history suggest diet, lactose intolerance playing a significant role in patient's symptoms. No alarm features identified. 1. Irritable bowel syndrome with diarrhea  2. Abdominal cramping  - Lifestyle modification with stress reduction, relaxation techniques discussed in detail.  - Dietary changes discussed, detailed discussion regarding importance of avoiding dairy products, foods with added sugars. Patient reluctant with dietary recommendations. - Fiber supplementation, issue with fiber increasing gas and bloating discussed, advised to use 2 to 3 teaspoons of OTC fiber with 8 to 10 ounces of water on a daily basis  -Bentyl 10 mg twice daily    > 50% of 40 minutes was spent spent on counseling, coordinating care based on my plan and assessment as noted above. Return in about 2 months (around 4/27/2023). Ray Abraham MD   Staff Gastroenterologist  [facility]    Please note this report has been partially produced using speech recognition software and may cause or contain errors related to thatsystem including grammar, punctuation and spelling as well as words and phrases that may seem inappropriate. If there are questions or concerns please feel free to contact me to clarify.

## 2023-04-17 LAB
LV EF: 81 %
LVEF MODALITY: NORMAL

## 2023-12-26 PROBLEM — R13.10 DYSPHAGIA: Status: ACTIVE | Noted: 2023-12-26

## 2023-12-26 RX ORDER — OMEGA-3-ACID ETHYL ESTERS 1 G/1
1 CAPSULE, LIQUID FILLED ORAL DAILY
COMMUNITY
Start: 2008-08-22

## 2023-12-26 RX ORDER — PAROXETINE HYDROCHLORIDE 20 MG/1
20 TABLET, FILM COATED ORAL EVERY MORNING
COMMUNITY
Start: 2007-10-15

## 2023-12-26 RX ORDER — LOVASTATIN 40 MG/1
40 TABLET ORAL NIGHTLY
COMMUNITY
Start: 2006-06-22

## 2023-12-26 RX ORDER — OMEPRAZOLE 20 MG/1
20 TABLET, DELAYED RELEASE ORAL
COMMUNITY
Start: 2007-10-15

## 2024-04-30 ENCOUNTER — HOSPITAL ENCOUNTER (OUTPATIENT)
Dept: PHYSICAL THERAPY | Age: 84
Setting detail: THERAPIES SERIES
Discharge: HOME | End: 2024-04-30
Payer: MEDICARE

## 2024-04-30 PROCEDURE — 97162 PT EVAL MOD COMPLEX 30 MIN: CPT

## 2024-04-30 ASSESSMENT — PAIN DESCRIPTION - LOCATION: LOCATION: ANKLE

## 2024-04-30 ASSESSMENT — PAIN DESCRIPTION - DESCRIPTORS: DESCRIPTORS: BURNING

## 2024-04-30 ASSESSMENT — PAIN SCALES - GENERAL: PAINLEVEL_OUTOF10: 3

## 2024-04-30 NOTE — PLAN OF CARE
[place] [address] [facility] [place] [address]  Phone: [phone]    [x] Certification  [] Recertification [x]  Plan of Care  [] Progress Note [] Discharge      Referring Provider: Sridhar Slater MD     From:  Samy Domínguez, PT, DPT  Patient: Rachael Negron (84 y.o. female) : 1940 Date: 2024  Medical Diagnosis: Achilles tendinitis, left leg [M76.62]  Left ankle sprain [S93.402A]       Treatment Diagnosis: L ankle pain, impaired standing and ambulatory tolerance, impaired gait mechanics, L ankle weakness, decreased L ankle ROM, decreased standing balance    Plan of Care/Certification Expiration Date: 24   Progress Report Period from:  2024  to 2024    Visits to Date: 1 No Show: 0 Cancelled Appts: 0    OBJECTIVE:   Long Term Goals - Time Frame for Long Term Goals : 6 weeks  Goals Current/ Discharge status Status   Long Term Goal 1: Pt will report at least 50% reduction in L heel and ankle pain form improved ambulatory tolerance.  Pain Screening  Patient Currently in Pain: Yes  Pain Assessment: 0-10  Pain Level: 3  Worst Pain Level: 9  Pain Location: Ankle  Pain Descriptors: Burning     New   Long Term Goal 2: Pt will be ambulatory community distances > 500ft with symmetrical step length and no increased pain symptoms.  Tolerance for short distances only at this time; walks independent and with HHA of spouse   New   Long Term Goal 3: Pt will demo 5/5 strength of L ankle and calf without pain to improve gait propulsion and ability to climb steps leading with LLE.  Strength LLE  Comment: 4-/5 ankle MMT secondary to pain  Strength RLE  Strength RLE: WFL  Comment: 5/5 ankle MMT       New   Long Term Goal 4: Pt will demo WNL L ankle AROM & PROM to help restore normalization of gait mechanics at terminal stance phase and heel strike.     AROM LLE (degrees)  LLE General AROM: ankle: symmetrical to R ankle in open chain; decreased DF while

## 2024-04-30 NOTE — PROGRESS NOTES
potential/prognosis is considered to be: Fair    Factors which may impact rehabilitation potential include: Chronicity of problem, Impaired previous level of function     Patient Education: PT Education: PT Role, Plan of Care, Evaluative findings, Goals, Fall prevention strategies, Anatomy of condition     GOALS   Patient Goal(s): Patient Goals : less pain, better walking and balance    Long Term Goals Completed by 6 weeks Goal Status   LTG 1 Pt will report at least 50% reduction in L heel and ankle pain form improved ambulatory tolerance. New   LTG 2 Pt will be ambulatory community distances > 500ft with symmetrical step length and no increased pain symptoms. New   LTG 3 Pt will demo 5/5 strength of L ankle and calf without pain to improve gait propulsion and ability to climb steps leading with LLE. New   LTG 4 Pt will demo WNL L ankle AROM & PROM to help restore normalization of gait mechanics at terminal stance phase and heel strike. New   LTG 5 Pt will be independent with recommended HEP and therapeutic pain mgmt techniques. New   LTG 6 LEFS score 30/80 to demo improved standing and ambulatory tolerance. New     TREATMENT PLAN       Requires PT Follow-Up: Yes    Treatment may include any combination of the following: Pain management, Modalities, Manual, Neuromuscular re-education, ROM, Strengthening, Functional mobility training, Gait training, Balance training, Home exercise program, Therapeutic activities, Patient/Caregiver education & training  Modalities: Heat/Cold, Ultrasound, E-stim - unattended, Vasopneumatic Device     Frequency / Duration:  Patient to be seen 2-3 times per week for 6 weeks  Plan Comment: phonophoresis recommended per PT prescription; 10% hyrdocortisone cream prescribed to patient for treatment          Eval Complexity:   Decision Making: Medium Complexity  History: Personal Factors and/or Comorbidities Impacting POC: High  History: R NIURKA, Berto TKAs, spinal fusion (complete), chronic L

## 2024-05-03 ENCOUNTER — HOSPITAL ENCOUNTER (OUTPATIENT)
Dept: PHYSICAL THERAPY | Age: 84
Setting detail: THERAPIES SERIES
Discharge: HOME | End: 2024-05-03
Payer: MEDICARE

## 2024-05-03 PROCEDURE — 97035 APP MDLTY 1+ULTRASOUND EA 15: CPT

## 2024-05-03 PROCEDURE — 97110 THERAPEUTIC EXERCISES: CPT

## 2024-05-03 ASSESSMENT — PAIN DESCRIPTION - LOCATION: LOCATION: ANKLE

## 2024-05-03 ASSESSMENT — PAIN DESCRIPTION - ORIENTATION: ORIENTATION: LEFT

## 2024-05-03 ASSESSMENT — PAIN DESCRIPTION - DESCRIPTORS: DESCRIPTORS: ACHING;BURNING

## 2024-05-03 ASSESSMENT — PAIN SCALES - GENERAL: PAINLEVEL_OUTOF10: 8

## 2024-05-03 ASSESSMENT — PAIN DESCRIPTION - PAIN TYPE: TYPE: ACUTE PAIN

## 2024-05-03 NOTE — PROGRESS NOTES
[place] [address] [address] [facility] [place] [address]  Phone: [phone]      Date: 5/3/2024  Patient: Rachael Negron  : 1940   Confirmed: Yes  MRN: [id number]  Referring Provider: Sridhar Slater MD    Medical Diagnosis: Achilles tendinitis, left leg [M76.62]  Left ankle sprain [S93.402A]       Treatment Diagnosis: L ankle pain, impaired standing and ambulatory tolerance, impaired gait mechanics, L ankle weakness, decreased L ankle ROM, decreased standing balance    Visit Information:  Insurance: Payor: [facility] MEDICARE / Plan: AET MEDICARE-ADVANTAGE PPO / Product Type: Medicare /   PT Visit Information  PT Insurance Information: [facility] Medicare  Total # of Visits Approved: 99 (BMN)  Total # of Visits to Date: 2  Plan of Care/Certification Expiration Date: 24  No Show: 0  Progress Note Due Date: 24  Canceled Appointment: 0  Progress Note Counter:     Subjective Information:  Subjective: The ankle isn't as swollen today as it has been.  When I know I'm going to be up on my feet a lot I try to wear a compression sleeve on my foot, that seems to help when I'm standing but it digs into my achilles when I sit.  HEP Compliance:  [] Good [] Fair [] Poor [] Reports not doing due to:    Pain Screening  Patient Currently in Pain: Yes  Pain Assessment: 0-10  Pain Level: 8  Pain Type: Acute pain  Pain Location: Ankle  Pain Orientation: Left  Pain Descriptors: Aching, Burning    Treatment:  Exercises:  Exercises  Exercise 1: phonophoresis with hydrocortisone cream (L posterior heel/achilles)  Exercise 2: heel slides 5\" x 10  Exercise 3: seated ankle PF  3\" x 10  Exercise 4: *ankle baps (seated) L2 4-way x 12 ea.           Modalities:  Ultrasound (CPT 52041)  Patient Position: Supine hook-lying  Ultrasound location: Left (Ankle)  Ultrasound specified location: Left achillies, pt in supine HL position with Left LE prop on Bolsters  Ultrasound frequency: 3 MHz  Ultrasound intensity (W/cm2):

## 2024-05-06 ENCOUNTER — APPOINTMENT (OUTPATIENT)
Dept: PHYSICAL THERAPY | Age: 84
End: 2024-05-06
Payer: MEDICARE

## 2024-05-08 ENCOUNTER — HOSPITAL ENCOUNTER (OUTPATIENT)
Dept: PHYSICAL THERAPY | Age: 84
Setting detail: THERAPIES SERIES
Discharge: HOME | End: 2024-05-08
Payer: MEDICARE

## 2024-05-08 PROCEDURE — 97140 MANUAL THERAPY 1/> REGIONS: CPT

## 2024-05-08 PROCEDURE — 97035 APP MDLTY 1+ULTRASOUND EA 15: CPT

## 2024-05-08 ASSESSMENT — PAIN DESCRIPTION - ORIENTATION: ORIENTATION: LEFT

## 2024-05-08 ASSESSMENT — PAIN DESCRIPTION - LOCATION: LOCATION: ANKLE

## 2024-05-08 ASSESSMENT — PAIN SCALES - GENERAL: PAINLEVEL_OUTOF10: 7

## 2024-05-08 ASSESSMENT — PAIN DESCRIPTION - PAIN TYPE: TYPE: ACUTE PAIN

## 2024-05-08 ASSESSMENT — PAIN DESCRIPTION - DESCRIPTORS: DESCRIPTORS: ACHING;BURNING

## 2024-05-08 NOTE — PROGRESS NOTES
progress   LTG 4 Pt will demo WNL L ankle AROM & PROM to help restore normalization of gait mechanics at terminal stance phase and heel strike. In progress   LTG 5 Pt will be independent with recommended HEP and therapeutic pain mgmt techniques. In progress   LTG 6 LEFS score 30/80 to demo improved standing and ambulatory tolerance. In progress     Plan:  Frequency/Duration:  Plan  Plan Frequency: 2-3  Plan weeks: 6  Current Treatment Recommendations: Pain management, Modalities, Manual, Neuromuscular re-education, ROM, Strengthening, Functional mobility training, Gait training, Balance training, Home exercise program, Therapeutic activities, Patient/Caregiver education & training  Modalities: Heat/Cold, Ultrasound, E-stim - unattended, Vasopneumatic Device  Additional Comments: phonophoresis recommended per PT prescription; 10% hyrdocortisone cream prescribed to patient for treatment  Pt to continue current HEP.  See objective section for any therapeutic exercise changes, additions or modifications this date.    Therapy Time:      PT Individual Minutes  Time In: 1332  Time Out: 1402  Minutes: 30  Timed Code Treatment Minutes: 30 Minutes  Procedure Minutes:  Timed Activity Minutes Units   Ther Ex 10 0   Manual  10 1   US  10 1     Electronically signed by Celso Babb PTA on 5/8/24 at 2:51 PM EDT

## 2024-05-10 ENCOUNTER — HOSPITAL ENCOUNTER (OUTPATIENT)
Dept: PHYSICAL THERAPY | Age: 84
Setting detail: THERAPIES SERIES
Discharge: HOME | End: 2024-05-10
Payer: MEDICARE

## 2024-05-10 PROCEDURE — 97035 APP MDLTY 1+ULTRASOUND EA 15: CPT

## 2024-05-10 PROCEDURE — 97140 MANUAL THERAPY 1/> REGIONS: CPT

## 2024-05-10 PROCEDURE — 97110 THERAPEUTIC EXERCISES: CPT

## 2024-05-10 NOTE — PROGRESS NOTES
[facility]  [address] [place] [address]  Phone: [phone]      Date: 5/10/2024  Patient: Rachael Negron  : 1940   Confirmed: Yes  MRN: [id number]  Referring Provider: Sridhar Slater MD    Medical Diagnosis: Achilles tendinitis, left leg [M76.62]  Left ankle sprain [S93.402A]       Treatment Diagnosis: L ankle pain, impaired standing and ambulatory tolerance, impaired gait mechanics, L ankle weakness, decreased L ankle ROM, decreased standing balance    Visit Information:  Insurance: Payor: [facility] MEDICARE / Plan: AET MEDICARE-ADVANTAGE PPO / Product Type: Medicare /   PT Visit Information  PT Insurance Information: [facility] Medicare  Total # of Visits Approved: 99 (BMN)  Total # of Visits to Date: 4  Plan of Care/Certification Expiration Date: 24  No Show: 0  Progress Note Due Date: 24  Canceled Appointment: 0  Progress Note Counter:     Subjective Information:  Subjective: Pt arrived to therapy today reporting pain of 7/10. I got some relief after going to the pool and walking after my last visit with that relief lasting until later that night. I've been trying to lay low and not do to much.  HEP Compliance:  [x] Good [] Fair [] Poor [] Reports not doing due to:         Treatment:  Exercises:  Exercises  Exercise 1: phonophoresis with hydrocortisone cream (L posterior heel/achilles)  Exercise 3: 4-way ankle AROM performed in HL position for ROM post manual and phonophoresis x 10-15 reps each.       Manual:   Manual Therapy  Soft Tissue Mobilizaton: edema massage (L foot) distal to proximal , STM of Left achillies.  Other: total Manual Time:  min       Modalities:  Ultrasound (CPT 25026)  Patient Position: Supine hook-lying  Ultrasound location: Left (Ankle)  Ultrasound specified location: Left achillies, pt in supine HL position with Left LE prop on Bolsters  Ultrasound frequency: 3 MHz  Ultrasound intensity (W/cm2): 1  Ultrasound mode: Pulsed

## 2024-05-13 ENCOUNTER — HOSPITAL ENCOUNTER (OUTPATIENT)
Dept: PHYSICAL THERAPY | Age: 84
Setting detail: THERAPIES SERIES
Discharge: HOME | End: 2024-05-13
Payer: MEDICARE

## 2024-05-13 PROCEDURE — 97140 MANUAL THERAPY 1/> REGIONS: CPT

## 2024-05-13 PROCEDURE — 97035 APP MDLTY 1+ULTRASOUND EA 15: CPT

## 2024-05-13 PROCEDURE — 97110 THERAPEUTIC EXERCISES: CPT

## 2024-05-13 ASSESSMENT — PAIN DESCRIPTION - DESCRIPTORS: DESCRIPTORS: ACHING;TIGHTNESS

## 2024-05-13 ASSESSMENT — PAIN SCALES - GENERAL: PAINLEVEL_OUTOF10: 7

## 2024-05-13 ASSESSMENT — PAIN DESCRIPTION - LOCATION: LOCATION: ANKLE

## 2024-05-13 ASSESSMENT — PAIN DESCRIPTION - PAIN TYPE: TYPE: ACUTE PAIN

## 2024-05-13 ASSESSMENT — PAIN DESCRIPTION - ORIENTATION: ORIENTATION: LEFT;POSTERIOR

## 2024-05-13 NOTE — PROGRESS NOTES
distal to proximal , STM of Left achillies.  Other: total Manual Time:  10 min       Modalities:  Ultrasound (CPT 42712)  Patient Position: Supine hook-lying  Ultrasound location: Left (Ankle)  Ultrasound specified location: Left achillies, pt in supine HL position with Left LE prop on Bolsters  Ultrasound frequency: 3 MHz  Ultrasound intensity (W/cm2): 1  Ultrasound mode: Pulsed (50%)  Ultrasound Parameters: phonophoresis with pt provided medication (2 pumps) US gel used as media for application  10 min  Limitations addressed: Pain modulation  1:1 Time (minutes): 10       *Indicates exercise, modality, or manual techniques to be initiated when appropriate    Objective Measures:           LTG 4 Current Status:: 5/10/24: Left Ankle AROM DF 11  GA 38             Assessment:   Body Structures, Functions, Activity Limitations Requiring Skilled Therapeutic Intervention: Increased pain, Decreased balance, Decreased functional mobility , Decreased high-level IADLs, Decreased body mechanics, Decreased strength, Decreased ADL status, Decreased ROM  Assessment: Gneralized pain reduction to 3/10 post tx. Pt with improved Ankle AROM in both DF and PF today post tx. Introduction of gentle stretches for improved mobility with good tolerance to new activities.  Treatment Diagnosis: L ankle pain, impaired standing and ambulatory tolerance, impaired gait mechanics, L ankle weakness, decreased L ankle ROM, decreased standing balance  Therapy Prognosis: Fair       Patient Education: [] NA       Post-Pain Assessment:       Pain Rating (0-10 pain scale):  3 /10   Location and pain description same as pre-treatment unless indicated.   Action: [] NA   [x] Perform HEP  [] Meds as prescribed  [] Modalities as prescribed   [] Call Physician     GOALS   Patient Goal(s): Patient Goals : less pain, better walking and balance      Long Term Goals Completed by 6 weeks Goal Status   LTG 1 Pt will report at least 50% reduction in L heel and ankle

## 2024-05-15 ENCOUNTER — APPOINTMENT (OUTPATIENT)
Dept: PHYSICAL THERAPY | Age: 84
End: 2024-05-15
Payer: MEDICARE

## 2024-05-17 ENCOUNTER — HOSPITAL ENCOUNTER (OUTPATIENT)
Dept: PHYSICAL THERAPY | Age: 84
Setting detail: THERAPIES SERIES
Discharge: HOME | End: 2024-05-17
Payer: MEDICARE

## 2024-05-17 PROCEDURE — 97140 MANUAL THERAPY 1/> REGIONS: CPT

## 2024-05-17 PROCEDURE — 97035 APP MDLTY 1+ULTRASOUND EA 15: CPT

## 2024-05-17 PROCEDURE — 97110 THERAPEUTIC EXERCISES: CPT

## 2024-05-17 ASSESSMENT — PAIN DESCRIPTION - PAIN TYPE: TYPE: ACUTE PAIN

## 2024-05-17 ASSESSMENT — PAIN SCALES - GENERAL: PAINLEVEL_OUTOF10: 6

## 2024-05-17 ASSESSMENT — PAIN DESCRIPTION - LOCATION: LOCATION: ANKLE

## 2024-05-17 ASSESSMENT — PAIN DESCRIPTION - DESCRIPTORS: DESCRIPTORS: ACHING;TIGHTNESS

## 2024-05-17 ASSESSMENT — PAIN DESCRIPTION - ORIENTATION: ORIENTATION: LEFT;POSTERIOR

## 2024-05-17 NOTE — PROGRESS NOTES
[place] [address] [address] [facility] [place] [address]  Phone: [phone]      Date: 2024  Patient: Rachael Negron  : 1940   Confirmed: Yes  MRN: [id number]  Referring Provider: Sridhar Slater MD    Medical Diagnosis: Achilles tendinitis, left leg [M76.62]  Left ankle sprain [S93.402A]       Treatment Diagnosis: L ankle pain, impaired standing and ambulatory tolerance, impaired gait mechanics, L ankle weakness, decreased L ankle ROM, decreased standing balance    Visit Information:  Insurance: Payor: [facility] MEDICARE / Plan: [facility] MEDICARE-ADVANTAGE PPO / Product Type: Medicare /   PT Visit Information  PT Insurance Information: [facility] Medicare  Total # of Visits Approved: 99 (BMN)  Total # of Visits to Date: 6  Plan of Care/Certification Expiration Date: 24  No Show: 0  Progress Note Due Date: 24  Canceled Appointment: 0  Progress Note Counter:     Subjective Information:  Subjective: I do my exercises every day, and I stay consistent with my water walking for about 30 min following each visit. Pt reports increased pain levels following last tx session later into the night reaching pain levels of 8/10, with pt reporting instant relief with applying aspercream medication lotion.  HEP Compliance:  [x] Good [] Fair [] Poor [] Reports not doing due to:    Pain Screening  Patient Currently in Pain: Yes  Pain Assessment: 0-10  Pain Level: 6  Pain Type: Acute pain  Pain Location: Ankle  Pain Orientation: Left, Posterior  Pain Descriptors: Aching, Tightness    Treatment:  Exercises:  Exercises  Exercise 1: phonophoresis with hydrocortisone cream (L posterior heel/achilles)  Exercise 3: 4-way ankle AROM performed in HL position for ROM post manual and phonophoresis x 15-20 reps each.  Exercise 7: Gentle Calf Stretches w/ strap Knee Ext 20\" x 3  Exercise 8: Gentle Calf Stretch w/ strap Knee flexed 20\" x 3  Exercise 20: HEP:  Gentle stretches.       Manual:   Manual Therapy  Soft

## 2024-05-20 ENCOUNTER — HOSPITAL ENCOUNTER (OUTPATIENT)
Dept: PHYSICAL THERAPY | Age: 84
Setting detail: THERAPIES SERIES
Discharge: HOME | End: 2024-05-20
Payer: MEDICARE

## 2024-05-20 PROCEDURE — 97035 APP MDLTY 1+ULTRASOUND EA 15: CPT

## 2024-05-20 PROCEDURE — 97140 MANUAL THERAPY 1/> REGIONS: CPT

## 2024-05-20 PROCEDURE — 97110 THERAPEUTIC EXERCISES: CPT

## 2024-05-20 ASSESSMENT — PAIN SCALES - GENERAL: PAINLEVEL_OUTOF10: 7

## 2024-05-20 ASSESSMENT — PAIN DESCRIPTION - LOCATION: LOCATION: ANKLE

## 2024-05-20 ASSESSMENT — PAIN DESCRIPTION - PAIN TYPE: TYPE: ACUTE PAIN

## 2024-05-20 ASSESSMENT — PAIN DESCRIPTION - DESCRIPTORS: DESCRIPTORS: ACHING;TIGHTNESS

## 2024-05-20 ASSESSMENT — PAIN DESCRIPTION - ORIENTATION: ORIENTATION: LEFT;POSTERIOR

## 2024-05-20 NOTE — PROGRESS NOTES
[place] [address] [facility] [place] [place] [address]  Phone: [phone]      Date: 2024  Patient: Rachael Negron  : 1940   Confirmed: Yes  MRN: [id number]  Referring Provider: Sridhar Slater MD    Medical Diagnosis: Achilles tendinitis, left leg [M76.62]  Left ankle sprain [S93.402A]       Treatment Diagnosis: L ankle pain, impaired standing and ambulatory tolerance, impaired gait mechanics, L ankle weakness, decreased L ankle ROM, decreased standing balance    Visit Information:  Insurance: Payor: [facility] MEDICARE / Plan: [facility] MEDICARE-ADVANTAGE PPO / Product Type: Medicare /   PT Visit Information  PT Insurance Information: Pending sale to [facility] Medicare  Total # of Visits Approved: 99 (BMN)  Total # of Visits to Date: 7  Plan of Care/Certification Expiration Date: 24  No Show: 0  Progress Note Due Date: 24  Canceled Appointment: 0  Progress Note Counter:     Subjective Information:  Subjective: Pt reports 30 min tolerance with pool walking and standing sink exercises, able to perform with no pain reported while in the water.  HEP Compliance:  [x] Good [] Fair [] Poor [] Reports not doing due to:    Pain Screening  Patient Currently in Pain: Yes  Pain Assessment: 0-10  Pain Level: 7  Pain Type: Acute pain  Pain Location: Ankle  Pain Orientation: Left, Posterior  Pain Descriptors: Aching, Tightness    Treatment:  Exercises:  Exercises  Exercise 3: 4-way ankle AROM performed in HL position for ROM post manual and phonophoresis x 15-20 reps each.  Exercise 7: Gentle Calf Stretches w/ strap Knee Ext 20\" x  Exercise 8: Gentle Calf Stretch w/ strap Knee flexed 20\" x 3  Exercise 20: HEP:  Gentle stretches.       Manual:   Manual Therapy  Joint Mobilization: Left ankle joint mobs, AP, PA and calcaneus  Soft Tissue Mobilizaton: edema massage (L foot) distal to proximal , STM of Left achillies.  Other: total Manual Time:  10 min       Modalities:  Ultrasound (CPT 19912)  Patient Position:

## 2024-05-24 ENCOUNTER — HOSPITAL ENCOUNTER (OUTPATIENT)
Dept: PHYSICAL THERAPY | Age: 84
Setting detail: THERAPIES SERIES
Discharge: HOME | End: 2024-05-24
Payer: MEDICARE

## 2024-05-24 PROCEDURE — 97140 MANUAL THERAPY 1/> REGIONS: CPT

## 2024-05-24 PROCEDURE — 97110 THERAPEUTIC EXERCISES: CPT

## 2024-05-24 PROCEDURE — 97035 APP MDLTY 1+ULTRASOUND EA 15: CPT

## 2024-05-24 ASSESSMENT — PAIN DESCRIPTION - PAIN TYPE: TYPE: CHRONIC PAIN;ACUTE PAIN

## 2024-05-24 ASSESSMENT — PAIN DESCRIPTION - DESCRIPTORS: DESCRIPTORS: ACHING;TIGHTNESS

## 2024-05-24 ASSESSMENT — PAIN SCALES - GENERAL: PAINLEVEL_OUTOF10: 5

## 2024-05-24 ASSESSMENT — PAIN DESCRIPTION - ORIENTATION: ORIENTATION: LEFT

## 2024-05-24 ASSESSMENT — PAIN DESCRIPTION - LOCATION: LOCATION: ANKLE;KNEE

## 2024-05-24 NOTE — PROGRESS NOTES
[place] [address] [address] [place] [address]  Phone: [phone]      Date: 2024  Patient: Rachael Negron  : 1940   Confirmed: Yes  MRN: [id number]  Referring Provider: Sridhar Slater MD    Medical Diagnosis: Achilles tendinitis, left leg [M76.62]  Left ankle sprain [S93.402A]       Treatment Diagnosis: L ankle pain, impaired standing and ambulatory tolerance, impaired gait mechanics, L ankle weakness, decreased L ankle ROM, decreased standing balance    Visit Information:  Insurance: Payor: [facility] MEDICARE / Plan: AET MEDICARE-ADVANTAGE PPO / Product Type: Medicare /   PT Visit Information  PT Insurance Information: [employer]  Total # of Visits Approved: 99 (BMN)  Total # of Visits to Date: 8  Plan of Care/Certification Expiration Date: 24  No Show: 0  Progress Note Due Date: 24  Canceled Appointment: 0  Progress Note Counter:     Subjective Information:  Subjective: I'm limping a little more today . but the good news is that I was able to sleep about 3 hours at a time for a couple last night and that was really good for me  HEP Compliance:  [x] Good [] Fair [] Poor [] Reports not doing due to:    Pain Screening  Patient Currently in Pain: Yes  Pain Assessment: 0-10  Pain Level: 5  Pain Type: Chronic pain, Acute pain  Pain Location: Ankle, Knee  Pain Orientation: Left  Pain Descriptors: Aching, Tightness    Treatment:  Exercises:  Exercises  Exercise 1: phonophoresis with hydrocortisone cream (L posterior heel/achilles)  Exercise 2: seated heel toe lifts x 10 ea.  Exercise 3: 4-way ankle AROM performed in HL position for ROM post manual and phonophoresis x 15-20 reps each.  Exercise 4: 4-way T-band Yellow x 10 Left ankle.  Exercise 7: Gentle Calf Stretches w/ strap Knee Ext 20\" x  Exercise 8: Gentle Calf Stretch w/ strap Knee flexed 20\" x 3  Exercise 20: HEP: continue with current.       Manual:   Manual Therapy  Joint Mobilization: Left ankle joint

## 2024-05-28 ENCOUNTER — HOSPITAL ENCOUNTER (OUTPATIENT)
Dept: PHYSICAL THERAPY | Age: 84
Setting detail: THERAPIES SERIES
Discharge: HOME | End: 2024-05-28
Payer: MEDICARE

## 2024-05-28 PROCEDURE — 97140 MANUAL THERAPY 1/> REGIONS: CPT

## 2024-05-28 PROCEDURE — 97035 APP MDLTY 1+ULTRASOUND EA 15: CPT

## 2024-05-28 PROCEDURE — 97110 THERAPEUTIC EXERCISES: CPT

## 2024-05-28 ASSESSMENT — PAIN DESCRIPTION - DESCRIPTORS: DESCRIPTORS: ACHING;SORE;TIGHTNESS

## 2024-05-28 ASSESSMENT — PAIN DESCRIPTION - PAIN TYPE: TYPE: CHRONIC PAIN

## 2024-05-28 ASSESSMENT — PAIN SCALES - GENERAL: PAINLEVEL_OUTOF10: 4

## 2024-05-28 ASSESSMENT — PAIN DESCRIPTION - ORIENTATION: ORIENTATION: LEFT

## 2024-05-28 ASSESSMENT — PAIN DESCRIPTION - LOCATION: LOCATION: ANKLE;FOOT

## 2024-05-28 NOTE — PROGRESS NOTES
[place] [address] [facility] [address] [place] [address]  Phone: [phone]      Date: 2024  Patient: Rachael Negron  : 1940   Confirmed: Yes  MRN: [id number]  Referring Provider: Sridhar Slater MD    Medical Diagnosis: Achilles tendinitis, left leg [M76.62]  Left ankle sprain [S93.402A]       Treatment Diagnosis: L ankle pain, impaired standing and ambulatory tolerance, impaired gait mechanics, L ankle weakness, decreased L ankle ROM, decreased standing balance    Visit Information:  Insurance: Payor: [facility] MEDICARE / Plan: [facility] MEDICARE-ADVANTAGE PPO / Product Type: Medicare /   PT Visit Information  PT Insurance Information: [facility] Medicare  Total # of Visits Approved: 99 (BMN)  Total # of Visits to Date: 9  Plan of Care/Certification Expiration Date: 24  No Show: 0  Progress Note Due Date: 24  Canceled Appointment: 0  Progress Note Counter:     Subjective Information:  Subjective: My feet have been more swollen over the last few days. The outside of my foot has been hurting. Pt indicated pain along 5th meta.  HEP Compliance:  [x] Good [] Fair [] Poor [] Reports not doing due to:    Pain Screening  Patient Currently in Pain: Yes  Pain Assessment: 0-10  Pain Level: 4 (4.5)  Pain Type: Chronic pain  Pain Location: Ankle, Foot  Pain Orientation: Left  Pain Descriptors: Aching, Sore, Tightness    Treatment:  Exercises:  Exercises  Exercise 1: phonophoresis with hydrocortisone cream (L posterior heel/achilles)  Exercise 4: 4-way T-band Yellow x 10 Left ankle.  Exercise 7: Gentle Calf Stretches w/ strap Knee Ext 20\" x  Exercise 8: Gentle Calf Stretch w/ strap Knee flexed 20\" x 3       Manual:   Manual Therapy  Joint Mobilization: Left ankle joint mobs, AP, PA and calcaneus  Soft Tissue Mobilizaton: edema massage (L foot) distal to proximal , STM of Left achillies., plantar surface heel to fore foot.  Other: total Manual Time:  15 min       Modalities:  Ultrasound (CPT

## 2024-05-31 ENCOUNTER — HOSPITAL ENCOUNTER (OUTPATIENT)
Dept: PHYSICAL THERAPY | Age: 84
Setting detail: THERAPIES SERIES
Discharge: HOME | End: 2024-05-31
Payer: MEDICARE

## 2024-05-31 PROCEDURE — 97140 MANUAL THERAPY 1/> REGIONS: CPT

## 2024-05-31 PROCEDURE — 97035 APP MDLTY 1+ULTRASOUND EA 15: CPT

## 2024-05-31 PROCEDURE — 97110 THERAPEUTIC EXERCISES: CPT

## 2024-05-31 ASSESSMENT — PAIN DESCRIPTION - DESCRIPTORS: DESCRIPTORS: ACHING;SORE;TIGHTNESS

## 2024-05-31 ASSESSMENT — PAIN DESCRIPTION - ORIENTATION: ORIENTATION: LEFT

## 2024-05-31 ASSESSMENT — PAIN DESCRIPTION - PAIN TYPE: TYPE: CHRONIC PAIN

## 2024-05-31 ASSESSMENT — PAIN DESCRIPTION - LOCATION: LOCATION: ANKLE;FOOT

## 2024-05-31 ASSESSMENT — PAIN SCALES - GENERAL: PAINLEVEL_OUTOF10: 5

## 2024-05-31 NOTE — PROGRESS NOTES
[facility]  [address] [place] [address]  Phone: [phone]      Date: 2024  Patient: Rachael Negron  : 1940   Confirmed: Yes  MRN: [id number]  Referring Provider: Sridhar Slater MD    Medical Diagnosis: Achilles tendinitis, left leg [M76.62]  Left ankle sprain [S93.402A]       Treatment Diagnosis: L ankle pain, impaired standing and ambulatory tolerance, impaired gait mechanics, L ankle weakness, decreased L ankle ROM, decreased standing balance    Visit Information:  Insurance: [facility] / Plan: AET MEDICARE-ADVANTAGE PPO / Product Type: Medicare /   PT Visit Information  PT Insurance Information: [facility] Medicare  Total # of Visits Approved: 99 (BMN)  Total # of Visits to Date: 10  Plan of Care/Certification Expiration Date: 24  No Show: 0  Progress Note Due Date: 24  Canceled Appointment: 0  Progress Note Counter: 10/12-18    Subjective Information:  Subjective: The swelling is down and the side of my foot doesn't hurt as much today. Still unable to resume pool activities as the pool currently remains closed due to repairs.  HEP Compliance:  [x] Good [] Fair [] Poor [] Reports not doing due to:    Pain Screening  Patient Currently in Pain: Yes  Pain Assessment: 0-10  Pain Level: 5  Pain Type: Chronic pain  Pain Location: Ankle, Foot  Pain Orientation: Left  Pain Descriptors: Aching, Sore, Tightness    Treatment:  Exercises:  Exercises  Exercise 1: phonophoresis with hydrocortisone cream (L posterior heel/achilles)  Exercise 2: seated heel toe lifts x 10 ea.  Exercise 3: 4-way ankle AROM performed in HL position for ROM post manual and phonophoresis x 15-20 reps each.  Exercise 4: 4-way T-band Yellow x 10 Left ankle.  Exercise 7: Gentle Calf Stretches w/ strap Knee Ext 20\" x  Exercise 8: Gentle Calf Stretch w/ strap Knee flexed 20\" x 3  Exercise 19: Functional test and measures  Exercise 20: HEP: continue with current.       Manual:   Manual 
       Signature: Objective information by: Electronically signed by Celso Babb PTA on 5/31/24 at 4:06 PM EDT      If you have any questions or concerns, please don't hesitate to call.  Thank you for your referral.    I have reviewed this plan of care and certify a need for medically necessary rehabilitation services.    Physician Signature:__________________________________________________________  Date:  Please sign and return

## 2024-06-03 ENCOUNTER — HOSPITAL ENCOUNTER (OUTPATIENT)
Dept: PHYSICAL THERAPY | Age: 84
Setting detail: THERAPIES SERIES
Discharge: HOME | End: 2024-06-03
Payer: MEDICARE

## 2024-06-03 PROCEDURE — 97035 APP MDLTY 1+ULTRASOUND EA 15: CPT

## 2024-06-03 PROCEDURE — 97110 THERAPEUTIC EXERCISES: CPT

## 2024-06-03 ASSESSMENT — PAIN DESCRIPTION - PAIN TYPE: TYPE: CHRONIC PAIN

## 2024-06-03 ASSESSMENT — PAIN DESCRIPTION - LOCATION: LOCATION: ANKLE;FOOT

## 2024-06-03 ASSESSMENT — PAIN DESCRIPTION - ORIENTATION: ORIENTATION: LEFT

## 2024-06-03 ASSESSMENT — PAIN DESCRIPTION - DESCRIPTORS: DESCRIPTORS: ACHING;SORE;TIGHTNESS

## 2024-06-03 ASSESSMENT — PAIN SCALES - GENERAL: PAINLEVEL_OUTOF10: 6

## 2024-06-03 NOTE — PROGRESS NOTES
rolls: 1' Left foot.  Exercise 20: HEP: continue with current.  Treatment Reasoning  Limitations addressed: Mobility, Strength      Modalities:  Ultrasound (CPT 85458)  Patient Position: Supine hook-lying  Ultrasound location: Left (Ankle)  Ultrasound specified location: Left achillies, pt in supine HL position with Left LE prop on Bolsters  Ultrasound frequency: 3 MHz  Ultrasound intensity (W/cm2): 1  Ultrasound mode: Pulsed (50%)  Ultrasound Parameters: phonophoresis with pt provided medication (2 pumps) US gel used as media for application  10 min  Limitations addressed: Pain modulation  1:1 Time (minutes): 10       *Indicates exercise, modality, or manual techniques to be initiated when appropriate    Objective Measures:       Assessment:   Body Structures, Functions, Activity Limitations Requiring Skilled Therapeutic Intervention: Increased pain, Decreased balance, Decreased functional mobility , Decreased high-level IADLs, Decreased body mechanics, Decreased strength, Decreased ADL status, Decreased ROM  Assessment: Progressed exercises to include Nustep, unable to perform SciFit bike today due to painful position and poor footwear. Progress continues to be slow and limited by pain tolernace. Progressing as able with decreased time spent on manual therapy techniques and increased time on therEx.  Treatment Diagnosis: L ankle pain, impaired standing and ambulatory tolerance, impaired gait mechanics, L ankle weakness, decreased L ankle ROM, decreased standing balance  Therapy Prognosis: Fair       Patient Education: [] NA   Discussed and answered pt's questions regarding dry needling.     Post-Pain Assessment:       Pain Rating (0-10 pain scale):   3/10   Location and pain description same as pre-treatment unless indicated.   Action: [] NA   [x] Perform HEP  [] Meds as prescribed  [] Modalities as prescribed   [] Call Physician     GOALS   Patient Goal(s): Patient Goals : less pain, better walking and

## 2024-06-06 ENCOUNTER — HOSPITAL ENCOUNTER (OUTPATIENT)
Dept: PHYSICAL THERAPY | Age: 84
Setting detail: THERAPIES SERIES
Discharge: HOME | End: 2024-06-06
Payer: MEDICARE

## 2024-06-06 PROCEDURE — 97035 APP MDLTY 1+ULTRASOUND EA 15: CPT

## 2024-06-06 PROCEDURE — 97110 THERAPEUTIC EXERCISES: CPT

## 2024-06-06 ASSESSMENT — PAIN SCALES - GENERAL: PAINLEVEL_OUTOF10: 2

## 2024-06-06 ASSESSMENT — PAIN DESCRIPTION - LOCATION: LOCATION: ANKLE;FOOT

## 2024-06-06 ASSESSMENT — PAIN DESCRIPTION - DESCRIPTORS: DESCRIPTORS: ACHING;SORE

## 2024-06-06 ASSESSMENT — PAIN DESCRIPTION - ORIENTATION: ORIENTATION: LEFT

## 2024-06-06 ASSESSMENT — PAIN DESCRIPTION - PAIN TYPE: TYPE: CHRONIC PAIN

## 2024-06-06 NOTE — PROGRESS NOTES
[id number] [facility] [place] [place] [address]  Phone: [phone]      Date: 2024  Patient: Rachael Negron  : 1940   Confirmed: Yes  MRN: [id number]  Referring Provider: Sridhar Slater MD    Medical Diagnosis: Achilles tendinitis, left leg [M76.62]  Left ankle sprain [S93.402A]       Treatment Diagnosis: L ankle pain, impaired standing and ambulatory tolerance, impaired gait mechanics, L ankle weakness, decreased L ankle ROM, decreased standing balance    Visit Information:  Insurance: Payor: [facility] MEDICARE / Plan: [facility] MEDICARE-ADVANTAGE PPO / Product Type: Medicare /   PT Visit Information  PT Insurance Information: [facility] Medicare  Total # of Visits Approved: 99 (BMN)  Total # of Visits to Date: 12  Plan of Care/Certification Expiration Date: 24  No Show: 0  Progress Note Due Date: 24  Canceled Appointment: 0  Progress Note Counter:     Subjective Information:  Subjective: I've been doing all my exercises, we went out and got some tennis ball and I've been using those. I did better on the bike then I thought I would, I wasn't as sore as I thought I would be. My foot was swollen again yesterday but I wasn't up on my feet very much.  HEP Compliance:  [x] Good [] Fair [] Poor [] Reports not doing due to:    Pain Screening  Patient Currently in Pain: Yes  Pain Assessment: 0-10  Pain Level: 2  Pain Type: Chronic pain  Pain Location: Ankle, Foot  Pain Orientation: Left  Pain Descriptors: Aching, Sore    Treatment:  Exercises:  Exercises  Exercise 1: phonophoresis with hydrocortisone cream (L posterior heel/achilles)  Exercise 4: 4-way T-band Yellow x 15 Left ankle.  Exercise 5: scifit (LE pedals) / Nustep Seat 8 Lvl 2 x 5 min  Exercise 7: Gentle Calf Stretches w/ strap Knee Ext 20\" x3  Exercise 8: Gentle Calf Stretch w/ strap Knee flexed 20\" x 3  Exercise 9: Ball rolls: 1' Left foot.  Exercise 10: Standing balance drills performed inside // bars: varied JAMAL on

## 2024-06-10 ENCOUNTER — HOSPITAL ENCOUNTER (OUTPATIENT)
Dept: PHYSICAL THERAPY | Age: 84
Setting detail: THERAPIES SERIES
Discharge: HOME | End: 2024-06-10
Payer: MEDICARE

## 2024-06-10 PROCEDURE — 97035 APP MDLTY 1+ULTRASOUND EA 15: CPT

## 2024-06-10 PROCEDURE — 97110 THERAPEUTIC EXERCISES: CPT

## 2024-06-10 ASSESSMENT — PAIN DESCRIPTION - DESCRIPTORS: DESCRIPTORS: ACHING;SORE

## 2024-06-10 ASSESSMENT — PAIN DESCRIPTION - ORIENTATION: ORIENTATION: LEFT

## 2024-06-10 ASSESSMENT — PAIN SCALES - GENERAL: PAINLEVEL_OUTOF10: 3

## 2024-06-10 ASSESSMENT — PAIN DESCRIPTION - PAIN TYPE: TYPE: CHRONIC PAIN

## 2024-06-10 ASSESSMENT — PAIN DESCRIPTION - LOCATION: LOCATION: ANKLE;FOOT

## 2024-06-10 NOTE — PROGRESS NOTES
[id number] [place] [address]  Phone: [phone]      Date: 6/10/2024  Patient: Rachael Negron  : 1940   Confirmed: Yes  MRN: [id number]  Referring Provider: Sridhar Slater MD    Medical Diagnosis: Achilles tendinitis, left leg [M76.62]  Left ankle sprain [S93.402A]       Treatment Diagnosis: L ankle pain, impaired standing and ambulatory tolerance, impaired gait mechanics, L ankle weakness, decreased L ankle ROM, decreased standing balance    Visit Information:  Insurance: Payor: [facility] MEDICARE / Plan: [facility] MEDICARE-ADVANTAGE PPO / Product Type: Medicare /   PT Visit Information  PT Insurance Information: [facility] Medicare  Total # of Visits Approved: 99 (BMN)  Total # of Visits to Date: 13  Plan of Care/Certification Expiration Date: 24  No Show: 0  Progress Note Due Date: 24  Canceled Appointment: 0  Progress Note Counter:     Subjective Information:  Subjective: If this is as good as it's going to get right now I might be ready to be done for now. Pt reports continued good complaince with HEP, brief discussion of dry needling, encouraged pt to have discussion with PT at next visit, if appropraite for current tx or not.  HEP Compliance:  [x] Good [] Fair [] Poor [] Reports not doing due to:    Pain Screening  Patient Currently in Pain: Yes  Pain Assessment: 0-10  Pain Level: 3  Pain Type: Chronic pain  Pain Location: Ankle, Foot  Pain Orientation: Left  Pain Descriptors: Aching, Sore    Treatment:  Exercises:  Exercises  Exercise 1: phonophoresis with hydrocortisone cream (L posterior heel/achilles)  Exercise 2: seated heel toe lifts x 15 ea.  Exercise 4: 4-way T-band Yellow x 15 Left ankle.  Exercise 5: scifit (LE pedals) / Nustep Seat 8 Lvl 3 x 5 min  Exercise 9: Ball rolls: 1' Left foot.  Exercise 10: Standing balance drills performed inside // bars: varied JAMAL on AirEx foam with minima UE support, B UE support open hand with SLS B Le and

## 2024-06-13 ENCOUNTER — HOSPITAL ENCOUNTER (OUTPATIENT)
Dept: PHYSICAL THERAPY | Age: 84
Setting detail: THERAPIES SERIES
Discharge: HOME | End: 2024-06-13
Payer: MEDICARE

## 2024-06-13 PROCEDURE — 97110 THERAPEUTIC EXERCISES: CPT

## 2024-06-13 PROCEDURE — 97035 APP MDLTY 1+ULTRASOUND EA 15: CPT

## 2024-06-13 ASSESSMENT — PAIN DESCRIPTION - PAIN TYPE: TYPE: CHRONIC PAIN

## 2024-06-13 ASSESSMENT — PAIN DESCRIPTION - LOCATION: LOCATION: ANKLE;FOOT

## 2024-06-13 ASSESSMENT — PAIN DESCRIPTION - ORIENTATION: ORIENTATION: LEFT

## 2024-06-13 ASSESSMENT — PAIN SCALES - GENERAL: PAINLEVEL_OUTOF10: 6

## 2024-06-13 NOTE — PROGRESS NOTES
[facility]  [address] [facility] [place] [place] [address]  Phone: [phone]    [] Certification  [] Recertification []  Plan of Care  [x] Progress Note [] Discharge      Referring Provider: Sridhar Slater MD     From:  Samy Domínguez DPT  Patient: Rachael Negron (84 y.o. female) : 1940 Date: 2024  Medical Diagnosis: Achilles tendinitis, left leg [M76.62]  Left ankle sprain [S93.402A]       Treatment Diagnosis: L ankle pain, impaired standing and ambulatory tolerance, impaired gait mechanics, L ankle weakness, decreased L ankle ROM, decreased standing balance    Plan of Care/Certification Expiration Date: 24   Progress Report Period from:  2024  to 2024    Visits to Date: 14 No Show: 0 Cancelled Appts: 0    OBJECTIVE:       Long Term Goals - Time Frame for Long Term Goals : 6 weeks  Goals Current/ Discharge status Status   Long Term Goal 1: Pt will report at least 50% reduction in L heel and ankle pain form improved ambulatory tolerance. LTG 1 Current Status:: 24: Pt reports an 80% overall reduction in pain levels since starting.   Met   Long Term Goal 2: Pt will be ambulatory community distances > 500ft with symmetrical step length and no increased pain symptoms. LTG 2 Current Status:: 24: Pt remains limited with ambualtion of 100ft by pain levels.   In progress, Partially met   Long Term Goal 3: Pt will demo 5/5 strength of L ankle and calf without pain to improve gait propulsion and ability to climb steps leading with LLE. LTG 3 Current Status:: 24: Left Ankle MMTing. [place] 4+/5 (pain)  DF 4+/5, Inv: 4+/5 , EV 4+/5   In progress, Partially met   Long Term Goal 4: Pt will demo WNL L ankle AROM & PROM to help restore normalization of gait mechanics at terminal stance phase and heel strike. LTG 4 Current Status:: 24: Left Ankle AROM DF 20  [place] 36  Inv 25  Ev 19   In progress, Partially met   Long Term Goal 5: Pt will be independent with recommended HEP and 
be initiated when appropriate    Objective Measures:                 LTG 1 Current Status:: 6/6/24: Pt reports an 80% overall reduction in pain levels since starting.  LTG 2 Current Status:: 6/13/24: Pt remains limited with ambualtion of 100ft by pain levels.  LTG 3 Current Status:: 5/31/24: Left Ankle MMTing. [place] 4+/5 (pain)  DF 4+/5, Inv: 4+/5 , EV 4+/5  LTG 4 Current Status:: 6/13/24: Left Ankle AROM DF 20  [place] 36  Inv 25  Ev 19  LTG 5 Current Status:: 6/13/24: Pt reports good compliance with HEP, performing daily.  LTG 6 Current Status:: 6/13/24;  LEFS score 42/80       Assessment:   Body Structures, Functions, Activity Limitations Requiring Skilled Therapeutic Intervention: Increased pain, Decreased balance, Decreased functional mobility , Decreased high-level IADLs, Decreased body mechanics, Decreased strength, Decreased ADL status, Decreased ROM  Assessment: Extensive time spent on pt education on progress towards goals and continued perform HEP following being placed on hold. Pt to resume aquatic therapy on her own to contiue to manage pain. If pt has not called to schedule additional visit witin 30 days will plan to DC at that time. Pt acknowledge her understanding and requested to hold therapy at this time to allow her to self manage her exercises.  Treatment Diagnosis: L ankle pain, impaired standing and ambulatory tolerance, impaired gait mechanics, L ankle weakness, decreased L ankle ROM, decreased standing balance  Therapy Prognosis: Fair       Patient Education: [] NA   Pt provided education from staff PT about dry needling, all questions answered to pt's satisfaction and educational information provided.     Post-Pain Assessment:       Pain Rating (0-10 pain scale):   4/10   Location and pain description same as pre-treatment unless indicated.   Action: [] NA   [x] Perform HEP  [] Meds as prescribed  [] Modalities as prescribed   [] Call Physician     GOALS   Patient Goal(s): Patient Goals : less pain,

## 2024-06-17 ENCOUNTER — APPOINTMENT (OUTPATIENT)
Dept: PHYSICAL THERAPY | Age: 84
End: 2024-06-17
Payer: MEDICARE

## 2024-06-21 ENCOUNTER — APPOINTMENT (OUTPATIENT)
Dept: PHYSICAL THERAPY | Age: 84
End: 2024-06-21
Payer: MEDICARE

## 2024-06-24 ENCOUNTER — APPOINTMENT (OUTPATIENT)
Dept: PHYSICAL THERAPY | Age: 84
End: 2024-06-24
Payer: MEDICARE

## 2024-06-28 ENCOUNTER — APPOINTMENT (OUTPATIENT)
Dept: PHYSICAL THERAPY | Age: 84
End: 2024-06-28
Payer: MEDICARE

## (undated) DEVICE — NITINOL STONE RETRIEVAL BASKET: Brand: ESCAPE

## (undated) DEVICE — TROCAR: Brand: KII FIOS FIRST ENTRY

## (undated) DEVICE — Z DUPLICATE USE 2431315 SET INSUF TBNG HI FLO W/ SMK EVAC FOR PNEUMOCLEAR

## (undated) DEVICE — Device

## (undated) DEVICE — STERILE COTTON BALLS LARGE 5/P: Brand: MEDLINE

## (undated) DEVICE — TROCAR: Brand: KII® SLEEVE

## (undated) DEVICE — GOWN,AURORA,NONRNF,XL,30/CS: Brand: MEDLINE

## (undated) DEVICE — PACK,SET UP,NO DRAPES: Brand: MEDLINE

## (undated) DEVICE — ELECTRODE PT RET AD L9FT HI MOIST COND ADH HYDRGEL CORDED

## (undated) DEVICE — DRAPE,LAP,CHOLE,W/TROUGHS,STERILE: Brand: MEDLINE

## (undated) DEVICE — SINGLE PORT MANIFOLD: Brand: NEPTUNE 2

## (undated) DEVICE — INTENDED FOR TISSUE SEPARATION, AND OTHER PROCEDURES THAT REQUIRE A SHARP SURGICAL BLADE TO PUNCTURE OR CUT.: Brand: BARD-PARKER ® CARBON RIB-BACK BLADES

## (undated) DEVICE — NEPTUNE E-SEP SMOKE EVACUATION PENCIL, COATED, 70MM BLADE, PUSH BUTTON SWITCH: Brand: NEPTUNE E-SEP

## (undated) DEVICE — SUTURE SZ 0 27IN 5/8 CIR UR-6  TAPER PT VIOLET ABSRB VICRYL J603H

## (undated) DEVICE — COUNTER NDL 40 COUNT HLD 70 FOAM BLK ADH W/ MAG

## (undated) DEVICE — TOWEL,OR,DSP,ST,BLUE,STD,4/PK,20PK/CS: Brand: MEDLINE

## (undated) DEVICE — GOWN,AURORA,NONREINFORCED,LARGE: Brand: MEDLINE

## (undated) DEVICE — APPLICATOR MEDICATED 26 CC SOLUTION HI LT ORNG CHLORAPREP

## (undated) DEVICE — BANDAGE ADH W0.75XL3IN UNIV WVN FAB NAT GEN USE STRP N ADH

## (undated) DEVICE — BANDAGE ADH W2XL4IN NITRL FAB STRP CURAD

## (undated) DEVICE — ELECTRODE LAP L36CM PTFE WIRE J HK CLEANCOAT

## (undated) DEVICE — LAPAROSCOPIC TROCAR SLEEVE/SINGLE USE: Brand: KII® OPTICAL ACCESS SYSTEM

## (undated) DEVICE — APPLIER CLP L SHFT DIA12MM 20 ROT MULT LIGACLP

## (undated) DEVICE — KIT,ANTI FOG,W/SPONGE & FLUID,SOFT PACK: Brand: MEDLINE

## (undated) DEVICE — SYRINGE MED 30ML STD CLR PLAS LUERLOCK TIP N CTRL DISP

## (undated) DEVICE — CATHETER IV 12GA L76MM EXTN DIA2.8MM FEP POLYMER THRM

## (undated) DEVICE — C-ARM: Brand: UNBRANDED

## (undated) DEVICE — TUOHY-BORST SIDE-ARM ADAPTER: Brand: COOK

## (undated) DEVICE — DRAPE EQUIP TRNSPRT CONTAINMENT FOR BK TAB

## (undated) DEVICE — TISSUE RETRIEVAL SYSTEM: Brand: INZII RETRIEVAL SYSTEM

## (undated) DEVICE — LABEL MED MINI W/ MARKER

## (undated) DEVICE — GAUZE,SPONGE,4"X4",16PLY,XRAY,STRL,LF: Brand: MEDLINE

## (undated) DEVICE — OPEN-END URETERAL CATHETER: Brand: COOK

## (undated) DEVICE — SUTURE MCRYL SZ 4-0 L27IN ABSRB UD L19MM PS-2 1/2 CIR PRIM Y426H

## (undated) DEVICE — WARMER SCP 2 ANTIFOG LAP DISP

## (undated) DEVICE — SINGLE-USE DIGITAL FLEXIBLE URETEROSCOPE: Brand: LITHOVUE

## (undated) DEVICE — GLOVE ORANGE PI 7 1/2   MSG9075

## (undated) DEVICE — Device: Brand: MEDEX

## (undated) DEVICE — 3M™ STERI-STRIP™ REINFORCED ADHESIVE SKIN CLOSURES, R1547, 1/2 IN X 4 IN (12 MM X 100 MM), 6 STRIPS/ENVELOPE: Brand: 3M™ STERI-STRIP™

## (undated) DEVICE — APPLIER CLP M L L11.4IN DIA10MM ENDOSCP ROT MULT FOR LIG